# Patient Record
Sex: MALE | Race: WHITE | NOT HISPANIC OR LATINO | Employment: UNEMPLOYED | URBAN - METROPOLITAN AREA
[De-identification: names, ages, dates, MRNs, and addresses within clinical notes are randomized per-mention and may not be internally consistent; named-entity substitution may affect disease eponyms.]

---

## 2017-07-10 ENCOUNTER — OFFICE VISIT (OUTPATIENT)
Dept: CARDIOLOGY | Facility: CLINIC | Age: 52
End: 2017-07-10

## 2017-07-10 VITALS
DIASTOLIC BLOOD PRESSURE: 74 MMHG | WEIGHT: 239 LBS | BODY MASS INDEX: 46.92 KG/M2 | HEIGHT: 60 IN | SYSTOLIC BLOOD PRESSURE: 134 MMHG | HEART RATE: 64 BPM

## 2017-07-10 DIAGNOSIS — R94.31 ABNORMAL ECG: ICD-10-CM

## 2017-07-10 DIAGNOSIS — I10 ESSENTIAL HYPERTENSION: ICD-10-CM

## 2017-07-10 DIAGNOSIS — I48.0 PAROXYSMAL ATRIAL FIBRILLATION (HCC): Primary | ICD-10-CM

## 2017-07-10 DIAGNOSIS — I25.9 MYOCARDIAL ISCHEMIA: ICD-10-CM

## 2017-07-10 PROCEDURE — 93000 ELECTROCARDIOGRAM COMPLETE: CPT | Performed by: INTERNAL MEDICINE

## 2017-07-10 PROCEDURE — 99204 OFFICE O/P NEW MOD 45 MIN: CPT | Performed by: INTERNAL MEDICINE

## 2017-07-10 NOTE — PROGRESS NOTES
Date of Office Visit: 07/10/17  Encounter Provider: Baldomero Velasquez MD  Place of Service: [facility]  Patient Name: Cesar Vann  :1965      Chief Complaint   Patient presents with   • Atrial Fibrillation     History of Present Illness  HPI Comments: Mr Vann is a pleasant 52 yo gentleman with a history of apparently rheumatic fever, and atrial fibrillation, hypertension, hyperlipidemia.  He says that when he was a child he was diagnosed with rheumatic fever and had atrial fibrillation is been on beta blockers since then.  Roughly  or  he had atrial flutter ablation at [facility].  He had been following up with cardiology but is probably not been seen now on 6 or 7 years.  He denies any history of an abnormal EKG.  He denies any chest pain or pressure.  Denies any shortness of breath orthopnea or PND.  He states he still gets palpitations albeit couple times a day usually lasting just a few seconds to minutes without any associated symptoms.  There are days he says he has atrial fibrillation nose episodes can last up all day long.  He does get his INR is routinely checked and is been on warfarin.  He denies any stroke type symptoms such as paralysis, paresthesias, dysarthria, or loss of vision.  Denies any blood in his stool or black tarry stools and no other bleeding.  Overall he feels like he's doing okay he just doesn't handle mental stress as well.    Atrial Fibrillation   Symptoms are negative for dizziness and weakness. Past medical history includes atrial fibrillation.         Past Medical History:   Diagnosis Date   • Acute stress disorder    • Anxiety    • Asthma    • Atrial fibrillation    • Chronic back pain    • Chronic knee pain    • Depression    • Dyslipidemia    • Hypercholesteremia    • Hypertension    • Noncompliance with treatment    • Obesity    • Prediabetes    • Sleep apnea          Past Surgical History:   Procedure  Laterality Date   • ABLATION OF DYSRHYTHMIC FOCUS     • CHOLECYSTECTOMY           Current Outpatient Prescriptions on File Prior to Visit   Medication Sig Dispense Refill   • ARIPiprazole (ABILIFY) 20 MG tablet Take 20 mg by mouth Daily.     • atorvastatin (LIPITOR) 40 MG tablet Take 40 mg by mouth Daily.     • cyclobenzaprine (FLEXERIL) 10 MG tablet Take 10 mg by mouth 3 (Three) Times a Day As Needed for Muscle Spasms.     • metoprolol succinate XL (TOPROL-XL) 100 MG 24 hr tablet Take 100 mg by mouth Daily.     • mirtazapine (REMERON SOL-TAB) 45 MG disintegrating tablet Take 45 mg by mouth Every Night.     • sertraline (ZOLOFT) 100 MG tablet Take 100 mg by mouth Daily.     • warfarin (COUMADIN) 1 MG tablet Take 1 mg by mouth Daily.     • warfarin (COUMADIN) 3 MG tablet Take 3 mg by mouth Daily.       No current facility-administered medications on file prior to visit.          Social History     Social History   • Marital status: Single     Spouse name: N/A   • Number of children: N/A   • Years of education: N/A     Occupational History   • Not on file.     Social History Main Topics   • Smoking status: Never Smoker   • Smokeless tobacco: Not on file   • Alcohol use No   • Drug use: Not on file   • Sexual activity: Not on file     Other Topics Concern   • Not on file     Social History Narrative       Family History   Problem Relation Age of Onset   • Heart disease Father    • Heart disease Paternal Grandfather    • Stroke Other    • Cancer Other    • Stroke Mother          Review of Systems   Constitution: Negative for decreased appetite, diaphoresis, fever, weakness, malaise/fatigue, weight gain and weight loss.   HENT: Negative for congestion, headaches, hearing loss, nosebleeds and tinnitus.    Eyes: Negative for blurred vision, double vision, vision loss in left eye, vision loss in right eye and visual disturbance.   Cardiovascular:        As noted in HPI   Respiratory:        As noted HPI   Endocrine:  "Negative for cold intolerance and heat intolerance.   Hematologic/Lymphatic: Negative for bleeding problem. Does not bruise/bleed easily.   Skin: Negative for color change, flushing, itching and rash.   Musculoskeletal: Negative for arthritis, back pain, joint pain, joint swelling, muscle weakness and myalgias.   Gastrointestinal: Negative for bloating, abdominal pain, constipation, diarrhea, dysphagia, heartburn, hematemesis, hematochezia, melena, nausea and vomiting.   Genitourinary: Negative for bladder incontinence, dysuria, frequency, nocturia and urgency.   Neurological: Negative for dizziness, focal weakness, light-headedness, loss of balance, numbness, paresthesias and vertigo.   Psychiatric/Behavioral: Positive for depression. Negative for memory loss and substance abuse. The patient is nervous/anxious.        Procedures      ECG 12 Lead  Date/Time: 7/10/2017 12:31 PM  Performed by: LATISHA REYES  Authorized by: LATISHA REYES   Comparison: compared with previous ECG   Similar to previous ECG  Rhythm: sinus rhythm  Conduction comments: Short SC   ST Depression: II, III, aVF, V3, V4, V5 and V6  T depression: II, III, aVF, V3, V4, V5 and V6  Other findings: LAE and YELENA               Objective:    /74 (BP Location: Left arm)  Pulse 64  Ht (!) 6\" (15.2 cm)  Wt 239 lb (108 kg)  BMI 4,667.65 kg/m2       Physical Exam  Physical Exam   Constitutional: He is oriented to person, place, and time. He appears well-developed and well-nourished. No distress.   HENT:   Head: Normocephalic.   Eyes: Conjunctivae are normal. Pupils are equal, round, and reactive to light. No scleral icterus.   Neck: Normal carotid pulses, no hepatojugular reflux and no JVD present. Carotid bruit is not present. No tracheal deviation, no edema and no erythema present. No thyromegaly present.   Cardiovascular: Normal rate, regular rhythm, S1 normal, S2 normal, normal heart sounds and intact distal pulses.   No extrasystoles are " present. PMI is not displaced.  Exam reveals no gallop, no distant heart sounds and no friction rub.    No murmur heard.  Pulses:       Carotid pulses are 2+ on the right side, and 2+ on the left side.       Radial pulses are 2+ on the right side, and 2+ on the left side.        Femoral pulses are 2+ on the right side, and 2+ on the left side.       Dorsalis pedis pulses are 2+ on the right side, and 2+ on the left side.        Posterior tibial pulses are 2+ on the right side, and 2+ on the left side.   Pulmonary/Chest: Effort normal and breath sounds normal. No respiratory distress. He has no decreased breath sounds. He has no wheezes. He has no rhonchi. He has no rales. He exhibits no tenderness.   Abdominal: Soft. Bowel sounds are normal. He exhibits no distension and no mass. There is no hepatosplenomegaly. There is no tenderness. There is no rebound and no guarding.   Musculoskeletal: He exhibits no edema, tenderness or deformity.   Neurological: He is alert and oriented to person, place, and time.   Skin: Skin is warm and dry. No rash noted. He is not diaphoretic. No cyanosis or erythema. No pallor. Nails show no clubbing.   Psychiatric: He has a normal mood and affect. His speech is normal and behavior is normal. Judgment and thought content normal.           Assessment:   1.  51-year-old gentleman with reported rheumatic fever not sure that he has rheumatic heart disease a do not hear a murmur today he says in the past she's had a click he's he'll return for an echocardiogram to rule out valvular disease will follow him clinically if unremarkable.  2.  Paroxysmal atrial fibrillation/flutter.  Previous flutter ablation.  Atrial Fibrillation and Atrial Flutter  Assessment  • The patient has atrial fibrillation-initial episode  • This is non-valvular in etiology  • The patient's CHADS2-VASc score is 1  • A [id number]-NLAe score of 1 is considered an intermediate risk for a thromboembolic event  • Warfarin  prescribed    Plan  • Attempt to maintain sinus rhythm  • Continue warfarin for antithrombotic therapy, bleeding issues discussed  • Continue beta blocker for rhythm control  He still has episodes of palpitations but on therapeutic warfarin.  We told him today when he has prolonged episodes he could try taking an extra half of the Toprol to see if that helps.    3.  Markedly abnormal EKG compatible with myocardial ischemia he's to return for perfusion stress this as a one-day non-walking protocol.  Non-walking due to his atrial fibrillation really can't be off beta blocker.  4.  Obstructive sleep apnea on CPAP.  5.  Hypertension blood pressure adequately controlled continue the same  6.  Hyperlipidemia on target dose atorvastatin.          Plan:

## 2017-08-01 ENCOUNTER — TELEPHONE (OUTPATIENT)
Dept: CARDIOLOGY | Facility: CLINIC | Age: 52
End: 2017-08-01

## 2017-08-01 ENCOUNTER — HOSPITAL ENCOUNTER (OUTPATIENT)
Dept: CARDIOLOGY | Facility: HOSPITAL | Age: 52
Discharge: HOME | End: 2017-08-01
Attending: INTERNAL MEDICINE

## 2017-08-01 ENCOUNTER — HOSPITAL ENCOUNTER (OUTPATIENT)
Dept: CARDIOLOGY | Facility: HOSPITAL | Age: 52
Discharge: HOME | End: 2017-08-01
Attending: INTERNAL MEDICINE | Admitting: INTERNAL MEDICINE

## 2017-08-01 VITALS
HEART RATE: 108 BPM | HEIGHT: 72 IN | BODY MASS INDEX: 32.37 KG/M2 | WEIGHT: 239 LBS | OXYGEN SATURATION: 96 % | DIASTOLIC BLOOD PRESSURE: 78 MMHG | SYSTOLIC BLOOD PRESSURE: 110 MMHG

## 2017-08-01 LAB
ASCENDING AORTA: 2.4 CM
BH CV ECHO MEAS - ACS: 2 CM
BH CV ECHO MEAS - AO MAX PG (FULL): 2.5 MMHG
BH CV ECHO MEAS - AO MAX PG: 6.9 MMHG
BH CV ECHO MEAS - AO MEAN PG (FULL): 0.76 MMHG
BH CV ECHO MEAS - AO MEAN PG: 3.2 MMHG
BH CV ECHO MEAS - AO ROOT AREA (BSA CORRECTED): 1.3
BH CV ECHO MEAS - AO ROOT AREA: 6.7 CM^2
BH CV ECHO MEAS - AO ROOT DIAM: 2.9 CM
BH CV ECHO MEAS - AO V2 MAX: 131.2 CM/SEC
BH CV ECHO MEAS - AO V2 MEAN: 81.7 CM/SEC
BH CV ECHO MEAS - AO V2 VTI: 17.6 CM
BH CV ECHO MEAS - AVA(I,A): 2.1 CM^2
BH CV ECHO MEAS - AVA(I,D): 2.1 CM^2
BH CV ECHO MEAS - AVA(V,A): 2.1 CM^2
BH CV ECHO MEAS - AVA(V,D): 2.1 CM^2
BH CV ECHO MEAS - BSA(HAYCOCK): 2.4 M^2
BH CV ECHO MEAS - BSA: 2.3 M^2
BH CV ECHO MEAS - BZI_BMI: 32.4 KILOGRAMS/M^2
BH CV ECHO MEAS - BZI_METRIC_HEIGHT: 182.9 CM
BH CV ECHO MEAS - BZI_METRIC_WEIGHT: 108.4 KG
BH CV ECHO MEAS - CONTRAST EF (2CH): 60.3 ML/M^2
BH CV ECHO MEAS - CONTRAST EF 4CH: 58.8 ML/M^2
BH CV ECHO MEAS - EDV(MOD-SP2): 58 ML
BH CV ECHO MEAS - EDV(MOD-SP4): 68 ML
BH CV ECHO MEAS - EDV(TEICH): 117 ML
BH CV ECHO MEAS - EF(CUBED): 73.9 %
BH CV ECHO MEAS - EF(MOD-SP2): 60.3 %
BH CV ECHO MEAS - EF(MOD-SP4): 58.8 %
BH CV ECHO MEAS - EF(TEICH): 65.5 %
BH CV ECHO MEAS - ESV(MOD-SP2): 23 ML
BH CV ECHO MEAS - ESV(MOD-SP4): 28 ML
BH CV ECHO MEAS - ESV(TEICH): 40.4 ML
BH CV ECHO MEAS - FS: 36.1 %
BH CV ECHO MEAS - IVS/LVPW: 1
BH CV ECHO MEAS - IVSD: 1.2 CM
BH CV ECHO MEAS - LAT PEAK E' VEL: 5 CM/SEC
BH CV ECHO MEAS - LV DIASTOLIC VOL/BSA (35-75): 29.6 ML/M^2
BH CV ECHO MEAS - LV MASS(C)D: 238 GRAMS
BH CV ECHO MEAS - LV MASS(C)DI: 103.6 GRAMS/M^2
BH CV ECHO MEAS - LV MAX PG: 4.4 MMHG
BH CV ECHO MEAS - LV MEAN PG: 2.4 MMHG
BH CV ECHO MEAS - LV SYSTOLIC VOL/BSA (12-30): 12.2 ML/M^2
BH CV ECHO MEAS - LV V1 MAX: 104.3 CM/SEC
BH CV ECHO MEAS - LV V1 MEAN: 72.8 CM/SEC
BH CV ECHO MEAS - LV V1 VTI: 13.8 CM
BH CV ECHO MEAS - LVIDD: 5 CM
BH CV ECHO MEAS - LVIDS: 3.2 CM
BH CV ECHO MEAS - LVLD AP2: 6.7 CM
BH CV ECHO MEAS - LVLD AP4: 7.3 CM
BH CV ECHO MEAS - LVLS AP2: 6.3 CM
BH CV ECHO MEAS - LVLS AP4: 6 CM
BH CV ECHO MEAS - LVOT AREA (M): 2.5 CM^2
BH CV ECHO MEAS - LVOT AREA: 2.7 CM^2
BH CV ECHO MEAS - LVOT DIAM: 1.8 CM
BH CV ECHO MEAS - LVPWD: 1.2 CM
BH CV ECHO MEAS - MED PEAK E' VEL: 6 CM/SEC
BH CV ECHO MEAS - MV DEC SLOPE: 284.4 CM/SEC^2
BH CV ECHO MEAS - MV DEC TIME: 0.2 SEC
BH CV ECHO MEAS - MV E MAX VEL: 60.6 CM/SEC
BH CV ECHO MEAS - MV MAX PG: 2.8 MMHG
BH CV ECHO MEAS - MV MEAN PG: 1.4 MMHG
BH CV ECHO MEAS - MV P1/2T MAX VEL: 62.1 CM/SEC
BH CV ECHO MEAS - MV P1/2T: 63.9 MSEC
BH CV ECHO MEAS - MV V2 MAX: 83.4 CM/SEC
BH CV ECHO MEAS - MV V2 MEAN: 54.9 CM/SEC
BH CV ECHO MEAS - MV V2 VTI: 13.5 CM
BH CV ECHO MEAS - MVA P1/2T LCG: 3.5 CM^2
BH CV ECHO MEAS - MVA(P1/2T): 3.4 CM^2
BH CV ECHO MEAS - MVA(VTI): 2.7 CM^2
BH CV ECHO MEAS - PA ACC TIME: 0.13 SEC
BH CV ECHO MEAS - PA MAX PG (FULL): 2.4 MMHG
BH CV ECHO MEAS - PA MAX PG: 4.6 MMHG
BH CV ECHO MEAS - PA PR(ACCEL): 20.4 MMHG
BH CV ECHO MEAS - PA V2 MAX: 107.2 CM/SEC
BH CV ECHO MEAS - PULM DIAS VEL: 75.3 CM/SEC
BH CV ECHO MEAS - PULM S/D: 0.49
BH CV ECHO MEAS - PULM SYS VEL: 36.9 CM/SEC
BH CV ECHO MEAS - PVA(V,A): 2.2 CM^2
BH CV ECHO MEAS - PVA(V,D): 2.2 CM^2
BH CV ECHO MEAS - QP/QS: 1
BH CV ECHO MEAS - RAP SYSTOLE: 3 MMHG
BH CV ECHO MEAS - RV MAX PG: 2.2 MMHG
BH CV ECHO MEAS - RV MEAN PG: 1.2 MMHG
BH CV ECHO MEAS - RV V1 MAX: 73.7 CM/SEC
BH CV ECHO MEAS - RV V1 MEAN: 51.5 CM/SEC
BH CV ECHO MEAS - RV V1 VTI: 11.6 CM
BH CV ECHO MEAS - RVOT AREA: 3.2 CM^2
BH CV ECHO MEAS - RVOT DIAM: 2 CM
BH CV ECHO MEAS - SI(AO): 51 ML/M^2
BH CV ECHO MEAS - SI(CUBED): 39.7 ML/M^2
BH CV ECHO MEAS - SI(LVOT): 16 ML/M^2
BH CV ECHO MEAS - SI(MOD-SP2): 15.2 ML/M^2
BH CV ECHO MEAS - SI(MOD-SP4): 17.4 ML/M^2
BH CV ECHO MEAS - SI(TEICH): 33.4 ML/M^2
BH CV ECHO MEAS - SUP REN AO DIAM: 1.9 CM
BH CV ECHO MEAS - SV(AO): 117.2 ML
BH CV ECHO MEAS - SV(CUBED): 91.2 ML
BH CV ECHO MEAS - SV(LVOT): 36.7 ML
BH CV ECHO MEAS - SV(MOD-SP2): 35 ML
BH CV ECHO MEAS - SV(MOD-SP4): 40 ML
BH CV ECHO MEAS - SV(RVOT): 36.6 ML
BH CV ECHO MEAS - SV(TEICH): 76.7 ML
BH CV ECHO MEAS - TAPSE (>1.6): 1.9 CM2
BH CV STRESS BP STAGE 1: NORMAL
BH CV STRESS COMMENTS STAGE 1: NORMAL
BH CV STRESS DOSE REGADENOSON STAGE 1: 0.4
BH CV STRESS DURATION MIN STAGE 1: 0
BH CV STRESS DURATION SEC STAGE 1: 15
BH CV STRESS HR STAGE 1: 148
BH CV STRESS PROTOCOL 1: NORMAL
BH CV STRESS RECOVERY BP: NORMAL MMHG
BH CV STRESS RECOVERY HR: 148 BPM
BH CV STRESS STAGE 1: 1
BH CV XLRA - RV BASE: 2.6 CM
BH CV XLRA - TDI S': 14 CM/SEC
E/E' RATIO: 5.5
LEFT ATRIUM VOLUME INDEX: 15 ML/M2
LV EF 2D ECHO EST: 59 %
LV EF NUC BP: 53 %
MAXIMAL PREDICTED HEART RATE: 169 BPM
PERCENT MAX PREDICTED HR: 87.57 %
SINUS: 2.4 CM
STJ: 2.3 CM
STRESS BASELINE BP: NORMAL MMHG
STRESS BASELINE HR: 118 BPM
STRESS PERCENT HR: 103 %
STRESS POST EXERCISE DUR SEC: 15 SEC
STRESS POST PEAK BP: NORMAL MMHG
STRESS POST PEAK HR: 148 BPM
STRESS TARGET HR: 144 BPM

## 2017-08-01 PROCEDURE — 93017 CV STRESS TEST TRACING ONLY: CPT

## 2017-08-01 PROCEDURE — 78452 HT MUSCLE IMAGE SPECT MULT: CPT | Performed by: INTERNAL MEDICINE

## 2017-08-01 PROCEDURE — 25010000002 PERFLUTREN (DEFINITY) 8.476 MG IN SODIUM CHLORIDE 10 ML INJECTION: Performed by: INTERNAL MEDICINE

## 2017-08-01 PROCEDURE — 93016 CV STRESS TEST SUPVJ ONLY: CPT | Performed by: INTERNAL MEDICINE

## 2017-08-01 PROCEDURE — C8929 TTE W OR WO FOL WCON,DOPPLER: HCPCS

## 2017-08-01 PROCEDURE — 25010000002 AMINOPHYLLINE PER 250 MG: Performed by: INTERNAL MEDICINE

## 2017-08-01 PROCEDURE — 93306 TTE W/DOPPLER COMPLETE: CPT | Performed by: INTERNAL MEDICINE

## 2017-08-01 PROCEDURE — 78452 HT MUSCLE IMAGE SPECT MULT: CPT

## 2017-08-01 PROCEDURE — 0 TECHNETIUM TETROFOSMIN KIT: Performed by: INTERNAL MEDICINE

## 2017-08-01 PROCEDURE — 25010000002 REGADENOSON 0.4 MG/5ML SOLUTION: Performed by: INTERNAL MEDICINE

## 2017-08-01 PROCEDURE — 93018 CV STRESS TEST I&R ONLY: CPT | Performed by: INTERNAL MEDICINE

## 2017-08-01 PROCEDURE — A9502 TC99M TETROFOSMIN: HCPCS | Performed by: INTERNAL MEDICINE

## 2017-08-01 RX ORDER — AMINOPHYLLINE DIHYDRATE 25 MG/ML
125 INJECTION, SOLUTION INTRAVENOUS ONCE
Status: COMPLETED | OUTPATIENT
Start: 2017-08-01 | End: 2017-08-01

## 2017-08-01 RX ADMIN — REGADENOSON 0.4 MG: 0.08 INJECTION, SOLUTION INTRAVENOUS at 13:10

## 2017-08-01 RX ADMIN — TETROFOSMIN 1 DOSE: 1.38 INJECTION, POWDER, LYOPHILIZED, FOR SOLUTION INTRAVENOUS at 13:10

## 2017-08-01 RX ADMIN — TETROFOSMIN 1 DOSE: 1.38 INJECTION, POWDER, LYOPHILIZED, FOR SOLUTION INTRAVENOUS at 12:01

## 2017-08-01 RX ADMIN — AMINOPHYLLINE 125 MG: 25 INJECTION, SOLUTION INTRAVENOUS at 13:20

## 2017-08-01 RX ADMIN — PERFLUTREN 1.5 ML: 6.52 INJECTION, SUSPENSION INTRAVENOUS at 11:59

## 2017-08-01 NOTE — TELEPHONE ENCOUNTER
Cesar Vann  Male, 51 y.o., 1965  PCP:   JUICE Ch  Language:   English  Need Interp:   No  Last Weight:   239 lb (108 kg)  Phone:   H: [phone]  Allergies  No Known Allergies  Health Maintenance:   Due  FYI:   None  Primary Ins.:   JOSE GUTIERREZ  MRN:   [id number]  MyChart:   Inactive  Pharmacy:   [address] [phone] [facility] [phone] FX [[id number]]  Preferred Lab:   None  Next Appt with Me:   None  Next Appt Date by Dept:   None    PACS Images        Radiology Images         Adult Transthoracic Echo Complete With Contrast   Status:  Final result   Visible to patient:  No (Not Released) Dx:  Paroxysmal atrial fibrillation Order: [id number]         Details        Reading Physician Reading Date Result Priority       Valerie Adams MD 8/1/2017 Routine           Result Text             · Left ventricular systolic function is normal. Calculated EF = 58.8%. Estimated EF was in agreement with the calculated EF. Estimated EF = 59%. Left ventricular wall thickness is consistent with moderate concentric hypertrophy. Left ventricular diastolic was unable to be assessed.  · There is mild thickening of the aortic valve.  · Trace mitral valve regurgitation is present.                    All Measurements          Ref Range & Units 12:00 PM         BSA m^2 2.3P       IVSd cm 1.2P       LVIDd cm 5.0P       LVIDs cm 3.2P       LVPWd cm 1.2P       IVS/LVPW  1.0P       FS % 36.1P       EDV(Teich) ml 117.0P       ESV(Teich) ml 40.4P       EF(Teich) % 65.5P       EF(cubed) % 73.9P       LV mass(C)d grams 238.0P       LV mass(C)dI grams/m^2 103.6P       SV(Teich) ml 76.7P       SI(Teich) ml/m^2 33.4P       SV(cubed) ml 91.2P       SI(cubed) ml/m^2 39.7P       Ao root diam cm 2.9P       Ao root area cm^2 6.7P       ACS cm 2.0P       LVOT diam cm 1.8P       LVOT area cm^2 2.7P       LVOT area(traced) cm^2 2.5P       RVOT diam cm 2.0P       RVOT area cm^2 3.2P        LVLd ap4 cm 7.3P       EDV(MOD-sp4) ml 68.0P       LVLs ap4 cm 6.0P       ESV(MOD-sp4) ml 28.0P       EF(MOD-sp4) % 58.8P       LVLd ap2 cm 6.7P       EDV(MOD-sp2) ml 58.0P       LVLs ap2 cm 6.3P       ESV(MOD-sp2) ml 23.0P       EF(MOD-sp2) % 60.3P       SV(MOD-sp4) ml 40.0P       SI(MOD-sp4) ml/m^2 17.4P       SV(MOD-sp2) ml 35.0P       SI(MOD-sp2) ml/m^2 15.2P       Ao root area (BSA corrected)  1.3P       Ao root area (BSA corrected) ml/m^2 60.3P       CONTRAST EF 4CH ml/m^2 58.8P       LV Diastolic corrected for BSA ml/m^2 29.6P       LV Systolic corrected for BSA ml/m^2 12.2P       MV E max sunday cm/sec 60.6P       MV V2 max cm/sec 83.4P       MV max PG mmHg 2.8P       MV V2 mean cm/sec 54.9P       MV mean PG mmHg 1.4P       MV V2 VTI cm 13.5P       MVA(VTI) cm^2 2.7P       MV P1/2t max sunday cm/sec 62.1P       MV P1/2t msec 63.9P       MVA(P1/2t) cm^2 3.4P       MV dec slope cm/sec^2 284.4P       MV dec time sec 0.2P       Ao pk sunday cm/sec 131.2P       Ao max PG mmHg 6.9P       Ao max PG (full) mmHg 2.5P       Ao V2 mean cm/sec 81.7P       Ao mean PG mmHg 3.2P       Ao mean PG (full) mmHg 0.76P       Ao V2 VTI cm 17.6P       RACHELLE(I,A) cm^2 2.1P       RACHELLE(I,D) cm^2 2.1P       RACHELLE(V,A) cm^2 2.1P       RACHELLE(V,D) cm^2 2.1P       LV V1 max PG mmHg 4.4P       LV V1 mean PG mmHg 2.4P       LV V1 max cm/sec 104.3P       LV V1 mean cm/sec 72.8P       LV V1 VTI cm 13.8P       SV(Ao) ml 117.2P       SI(Ao) ml/m^2 51.0P       SV(LVOT) ml 36.7P       SV(RVOT) ml 36.6P       SI(LVOT) ml/m^2 16.0P       PA V2 max cm/sec 107.2P       PA max PG mmHg 4.6P       PA max PG (full) mmHg 2.4P       BH CV ECHO MIGUELITO - PVA(V,A) cm^2 2.2P       BH CV ECHO MIGUELITO - PVA(V,D) cm^2 2.2P       PA acc time sec 0.13P       RV V1 max PG mmHg 2.2P       RV V1 mean PG mmHg 1.2P       RV V1 max cm/sec 73.7P       RV V1 mean cm/sec 51.5P       RV V1 VTI cm 11.6P       RAP systole mmHg 3.0P       PA pr(Accel) mmHg 20.4P       Pulm Sys Sunday cm/sec 36.9P        Pulm Espinosa Sunday cm/sec 75.3P       Pulm S/D  0.49P       Qp/Qs  1.0P       MVA P1/2T LCG cm^2 3.5P       BH CV ECHO MIGUELITO - BZI_BMI kilograms/m^2 32.4P       BH CV ECHO MIGUELITO - BSA(HAYCOCK) m^2 2.4P       BH CV ECHO MIGUELITO - BZI_METRIC_WEIGHT kg 108.4P        CV ECHO MIGUELITO - BZI_METRIC_HEIGHT cm 182.9P       TDI S' cm/sec 14.00       RV Base cm 2.60       Sinus cm 2.40       STJ cm 2.30       Ascending aorta cm 2.40       E/E' ratio  5.5       LA Volume Index mL/m2 15.0       Lat Peak E' Sunday cm/sec 5.0       Med Peak E' Sunday cm/sec 6.00       Sup Jerry Ao Diam cm 1.90       TAPSE (>1.6) cm2 1.90       Echo EF Estimated % 59                       [facility] OUTPATIENT ECHOCARDIOGRAPHY  [address]  [address]  [facility] [address] [phone]            Study Description        A two-dimensional transthoracic echocardiogram with color flow and Doppler was performed.   The study is technically good for diagnosis.Verbal consent was obtained from the patient to use Definity contrast in order to optimize the study. The use of Definity was indicated as two or more contiguous segments of the left ventricular endocardial border were unable to be adequately visualized by standard imaging methods. 1.3 mL of mechanically activated Definity was mixed with 8.7 mL of normal saline. A total of 1.5 mL of the resulting Definity solution was administered, and the remaining contrast was wasted and discarded.   No adverse reaction to contrast was noted. Lot# [id number]  Exp 8/1/18.   Atrial fibrillation was the predominant rhythm observed during the procedure.         Echocardiogram Findings        Left Ventricle  Left ventricular systolic function is normal. Calculated EF = 58.8%. Estimated EF was in agreement with the calculated EF. Estimated EF = 59%. Left ventricular wall thickness is consistent with moderate concentric hypertrophy. Left ventricular diastolic was unable to be assessed.       Right Ventricle  Normal  right ventricular cavity size, wall thickness, systolic function and septal motion noted.       Left Atrium  Normal left atrial volume noted.       Right Atrium  Normal right atrial size noted. The inferior vena cava is normally sized. Normal IVC inspiratory collapse of greater than 50% noted.       Aortic Valve  The valve was poorly visualized but appears trileaflet. The aortic valve is abnormal in structure. There is mild thickening of the aortic valve. No aortic valve regurgitation is present. No aortic valve stenosis is present.       Mitral Valve  The mitral valve is normal in structure. Trace mitral valve regurgitation is present. No significant mitral valve stenosis is present.       Tricuspid Valve  The tricuspid valve is grossly normal. No tricuspid valve regurgitation is present.       Pulmonic Valve  The pulmonic valve is grossly normal in structure. There is no pulmonic valve regurgitation present.       Greater Vessels  No dilation of the aortic root is present. No dilation of the sinuses of Valsalva is present. The aortic arch was not well visualized. The inferior vena cava is normally sized. Normal IVC inspiratory collapse of greater than 50% noted.       Pericardium  The pericardium is normal. There is no evidence of pericardial effusion.           Wall Scoring        Score Index: 1.000 Percent Normal: 100.0%             The left ventricular wall motion is normal.                         PACS Images        Show images for Adult Transthoracic Echo Complete With Contrast                 Last Resulted: 08/01/17 12:04 PM                 P=Value has a preliminary status                Status of Other Orders        Order    Lab Status Result Date Provider Status       Stress Test With Myocardial Perfusion One Day In process 8/1/2017 Open       ECG 12 Lead Final result 7/10/2017 Open       SCANNED EKG Final result 7/10/2017 Open              Canceled By On Released       Adult Transthoracic Echo Complete  Nazia Edmonds RDCS 8/1/2017 12:00 PM 7/10/2017 12:25 PM       Reason: Per Protocol                  Routing History        Priority Sent On From To Message Type        8/1/2017 12:14 PM MD Baldomero Padilla MD Results

## 2017-08-01 NOTE — TELEPHONE ENCOUNTER
Cesar Vann  Male, 51 y.o., 1965  PCP:   JUICE Ch  Language:   English  Need Interp:   No  Last Weight:   239 lb (108 kg)  Phone:   H: [phone]  Allergies  No Known Allergies  Health Maintenance:   Due  FYI:   None  Primary Ins.:   JOSE GUTIERREZ  MRN:   [id number]  MyChart:   Inactive  Pharmacy:   [address] [phone] [facility] [phone] FX [[id number]]  Preferred Lab:   None  Next Appt with Me:   None  Next Appt Date by Dept:   None    PACS Images        Radiology Images         Stress Test With Myocardial Perfusion One Day   Status:  Final result   Visible to patient:  No (Not Released) Dx:  Myocardial ischemia Order: [id number]         Details        Reading Physician Reading Date Result Priority       MD Mele Sweeney MD Louis G Meriwether, MD 8/1/2017 8/1/2017 8/1/2017 Routine           Result Text             · Myocardial perfusion imaging indicates a normal myocardial perfusion study with no evidence of ischemia.  · Left ventricular ejection fraction is normal (Calculated EF = 53%).  · Impressions are consistent with a low risk study.                    All Measurements          Ref Range & Units 2:15 PM         BH CV STRESS PROTOCOL 1  Pharmacologic       Stage 1  1       HR Stage 1  148       BP Stage 1  150/92       Duration Min Stage 1  0       Duration Sec Stage 1  15       Stress Dose Regadenoson Stage 1  0.4       Stress Comments Stage 1  15 sec bolus injection       Baseline HR bpm 118       Baseline BP mmHg 145/99       Peak HR bpm 148       Percent Max Pred HR % 87.57       Percent Target HR % 103       Peak BP mmHg 150/92       Recovery HR bpm 148       Recovery BP mmHg 152/88       Target HR (85%) bpm 144       Max. Pred. HR (100%) bpm 169       Exercise duration (sec) sec 15       Nuc Stress EF % 53                       [facility] NUC CARD  [address] [employer]  [facility]  [address]  [facility] [address]  [phone]            Stress Data        Stage HR (bpm) BP (mmHg) Minutes Seconds Dose (mg) Comments       1        148        150/92        0        15        0.4        15 sec bolus injection                  Stress Measurements        Baseline Vitals   Baseline  bpm        Baseline /99 mmHg         Peak Stress Vitals   Peak  bpm        Peak /92 mmHg         Recovery Vitals   Recovery  bpm        Recovery /88 mmHg         Exercise Data   Target HR (85%) 144 bpm        Max. Pred. HR (100%) 169 bpm        Percent Max Pred HR 87.57 %        Exercise duration (sec) 15 sec                 Stress Procedure        Rest ECG  Baseline ECG rhythm of atrial fibrillation noted. Non-specific ST-T wave changes noted.   QRS complex = 80 ms. There was no ST segment deviation noted.       Stress ECG  Stress ECG rhythm of atrial fibrillation noted. Non-specific ST-T wave changes noted.   There was no ST segment deviation noted during stress.   There were no arrhythmias during stress.   There were no arrhythmias during recovery.   There were no significant arrhythmias noted during the test.   ECG was interpretable.   Equivocal stress ECG interpretation.       Stress Description  A pharmacological stress test was performed using regadenoson without low-level exercise.   The patient reached the end of the protocol.   The patient reported dyspnea during the stress test. Symptoms were not clinically significant.       Stress Findings  No ECG evidence of myocardial ischemia.Indeterminate clinical evidence of myocardial ischemia. Findings consistent with an equivocal ECG stress test.           Nuclear Perfusion Findings        Study Impression  Myocardial perfusion imaging indicates a normal myocardial perfusion study with no evidence of ischemia. Impressions are consistent with a low risk study. There is no prior study available for comparison.       Rest Perfusion  Defect 1  No rest myocardial perfusion defect noted.       Stress Perfusion Defect 1  No stress myocardial perfusion defect noted.       Ventricle Size / Description  Left ventricular ejection fraction is normal (Calculated EF = 53%). Normal LV cavity size. Normal LV wall motion noted. Normal RV cavity size.           PACS Images        Show images for Stress Test With Myocardial Perfusion One Day                 Last Resulted: 08/01/17  3:58 PM                            Status of Other Orders        Order    Lab Status Result Date Provider Status       ECG 12 Lead Final result 7/10/2017 Open       SCANNED EKG Final result 7/10/2017 Open       Adult Transthoracic Echo Complete With Contrast Final result 8/1/2017 Reviewed 8/1/2017  2:21 PM              Canceled By On Released       Adult Transthoracic Echo Complete Nazia Edmonds RDCS 8/1/2017 12:00 PM 7/10/2017 12:25 PM       Reason: Per Protocol                  Routing History        Priority Sent On From To Message Type        8/1/2017  4:00 PM MD Baldomero Sweeney MD Results        8/1/2017 12:14 PM MD Baldomero Padilla MD Results

## 2017-08-14 NOTE — TELEPHONE ENCOUNTER
Called as below. Needs a BP cuff and check it at home and call back with those. Will go back up to normal toprol dose.STANFORDI

## 2018-01-22 RX ORDER — METOPROLOL SUCCINATE 100 MG/1
100 TABLET, EXTENDED RELEASE ORAL DAILY
Qty: 90 TABLET | Refills: 2 | Status: SHIPPED | OUTPATIENT
Start: 2018-01-22 | End: 2018-04-02 | Stop reason: SDUPTHER

## 2018-04-02 RX ORDER — METOPROLOL SUCCINATE 100 MG/1
100 TABLET, EXTENDED RELEASE ORAL DAILY
Qty: 90 TABLET | Refills: 1 | Status: SHIPPED | OUTPATIENT
Start: 2018-04-02 | End: 2018-11-07 | Stop reason: SDUPTHER

## 2018-09-05 RX ORDER — WARFARIN SODIUM 3 MG/1
TABLET ORAL
Qty: 30 TABLET | Refills: 0 | OUTPATIENT
Start: 2018-09-05

## 2018-11-12 RX ORDER — METOPROLOL SUCCINATE 100 MG/1
100 TABLET, EXTENDED RELEASE ORAL DAILY
Qty: 90 TABLET | Refills: 0 | Status: SHIPPED | OUTPATIENT
Start: 2018-11-12 | End: 2019-04-13 | Stop reason: SDUPTHER

## 2019-04-15 RX ORDER — METOPROLOL SUCCINATE 100 MG/1
100 TABLET, EXTENDED RELEASE ORAL DAILY
Qty: 90 TABLET | Refills: 0 | Status: SHIPPED | OUTPATIENT
Start: 2019-04-15 | End: 2019-09-17 | Stop reason: SDUPTHER

## 2019-09-17 RX ORDER — METOPROLOL SUCCINATE 100 MG/1
100 TABLET, EXTENDED RELEASE ORAL DAILY
Qty: 90 TABLET | Refills: 0 | Status: SHIPPED | OUTPATIENT
Start: 2019-09-17 | End: 2019-12-17 | Stop reason: SDUPTHER

## 2019-12-18 RX ORDER — METOPROLOL SUCCINATE 100 MG/1
100 TABLET, EXTENDED RELEASE ORAL DAILY
Qty: 30 TABLET | Refills: 0 | Status: SHIPPED | OUTPATIENT
Start: 2019-12-18 | End: 2020-02-19 | Stop reason: SDUPTHER

## 2020-03-07 ENCOUNTER — HOSPITAL ENCOUNTER (INPATIENT)
Facility: HOSPITAL | Age: 55
LOS: 12 days | Discharge: HOME | End: 2020-03-19
Attending: EMERGENCY MEDICINE | Admitting: INTERNAL MEDICINE

## 2020-03-07 ENCOUNTER — APPOINTMENT (OUTPATIENT)
Dept: CT IMAGING | Facility: HOSPITAL | Age: 55
End: 2020-03-07

## 2020-03-07 DIAGNOSIS — K85.90 ACUTE PANCREATITIS, UNSPECIFIED COMPLICATION STATUS, UNSPECIFIED PANCREATITIS TYPE: Primary | ICD-10-CM

## 2020-03-07 DIAGNOSIS — E11.9 TYPE 2 DIABETES MELLITUS WITHOUT COMPLICATION, WITHOUT LONG-TERM CURRENT USE OF INSULIN (HCC): ICD-10-CM

## 2020-03-07 DIAGNOSIS — R73.9 HYPERGLYCEMIA: ICD-10-CM

## 2020-03-07 DIAGNOSIS — I10 ESSENTIAL HYPERTENSION: ICD-10-CM

## 2020-03-07 PROBLEM — E78.00 HYPERCHOLESTEREMIA: Status: ACTIVE | Noted: 2020-03-07

## 2020-03-07 PROBLEM — J45.909 ASTHMA: Status: ACTIVE | Noted: 2020-03-07

## 2020-03-07 PROBLEM — G47.30 SLEEP APNEA: Status: ACTIVE | Noted: 2020-03-07

## 2020-03-07 LAB
ALBUMIN SERPL-MCNC: 4.6 G/DL (ref 3.5–5.2)
ALBUMIN/GLOB SERPL: 1.9 G/DL
ALP SERPL-CCNC: 62 U/L (ref 39–117)
ALT SERPL W P-5'-P-CCNC: 74 U/L (ref 1–41)
ANION GAP SERPL CALCULATED.3IONS-SCNC: 12.3 MMOL/L (ref 5–15)
ANION GAP SERPL CALCULATED.3IONS-SCNC: 15.8 MMOL/L (ref 5–15)
AST SERPL-CCNC: 58 U/L (ref 1–40)
BASOPHILS # BLD AUTO: 0.02 10*3/MM3 (ref 0–0.2)
BASOPHILS NFR BLD AUTO: 0.1 % (ref 0–1.5)
BILIRUB SERPL-MCNC: 0.6 MG/DL (ref 0.2–1.2)
BILIRUB UR QL STRIP: NEGATIVE
BUN BLD-MCNC: 18 MG/DL (ref 6–20)
BUN BLD-MCNC: 18 MG/DL (ref 6–20)
BUN/CREAT SERPL: 17 (ref 7–25)
BUN/CREAT SERPL: 17.3 (ref 7–25)
CALCIUM SPEC-SCNC: 8.5 MG/DL (ref 8.6–10.5)
CALCIUM SPEC-SCNC: 9.3 MG/DL (ref 8.6–10.5)
CHLORIDE SERPL-SCNC: 103 MMOL/L (ref 98–107)
CHLORIDE SERPL-SCNC: 105 MMOL/L (ref 98–107)
CLARITY UR: CLEAR
CO2 SERPL-SCNC: 21.2 MMOL/L (ref 22–29)
CO2 SERPL-SCNC: 23.7 MMOL/L (ref 22–29)
COLOR UR: YELLOW
CREAT BLD-MCNC: 1.04 MG/DL (ref 0.76–1.27)
CREAT BLD-MCNC: 1.06 MG/DL (ref 0.76–1.27)
DEPRECATED RDW RBC AUTO: 42.7 FL (ref 37–54)
DEPRECATED RDW RBC AUTO: 47.1 FL (ref 37–54)
EOSINOPHIL # BLD AUTO: 0 10*3/MM3 (ref 0–0.4)
EOSINOPHIL NFR BLD AUTO: 0 % (ref 0.3–6.2)
ERYTHROCYTE [DISTWIDTH] IN BLOOD BY AUTOMATED COUNT: 13.1 % (ref 12.3–15.4)
ERYTHROCYTE [DISTWIDTH] IN BLOOD BY AUTOMATED COUNT: 13.8 % (ref 12.3–15.4)
GFR SERPL CREATININE-BSD FRML MDRD: 73 ML/MIN/1.73
GFR SERPL CREATININE-BSD FRML MDRD: 74 ML/MIN/1.73
GLOBULIN UR ELPH-MCNC: 2.4 GM/DL
GLUCOSE BLD-MCNC: 263 MG/DL (ref 65–99)
GLUCOSE BLD-MCNC: 346 MG/DL (ref 65–99)
GLUCOSE BLDC GLUCOMTR-MCNC: 193 MG/DL (ref 70–130)
GLUCOSE BLDC GLUCOMTR-MCNC: 198 MG/DL (ref 70–130)
GLUCOSE BLDC GLUCOMTR-MCNC: 217 MG/DL (ref 70–130)
GLUCOSE UR STRIP-MCNC: ABNORMAL MG/DL
HBA1C MFR BLD: 8.8 % (ref 4.8–5.6)
HCT VFR BLD AUTO: 44.3 % (ref 37.5–51)
HCT VFR BLD AUTO: 47.1 % (ref 37.5–51)
HGB BLD-MCNC: 15.3 G/DL (ref 13–17.7)
HGB BLD-MCNC: 16.5 G/DL (ref 13–17.7)
HGB UR QL STRIP.AUTO: NEGATIVE
HOLD SPECIMEN: NORMAL
HOLD SPECIMEN: NORMAL
IMM GRANULOCYTES # BLD AUTO: 0.06 10*3/MM3 (ref 0–0.05)
IMM GRANULOCYTES NFR BLD AUTO: 0.4 % (ref 0–0.5)
INR PPP: 1.08 (ref 0.9–1.1)
KETONES UR QL STRIP: ABNORMAL
LEUKOCYTE ESTERASE UR QL STRIP.AUTO: NEGATIVE
LIPASE SERPL-CCNC: >3000 U/L (ref 13–60)
LYMPHOCYTES # BLD AUTO: 1.15 10*3/MM3 (ref 0.7–3.1)
LYMPHOCYTES NFR BLD AUTO: 7.6 % (ref 19.6–45.3)
MCH RBC QN AUTO: 31.1 PG (ref 26.6–33)
MCH RBC QN AUTO: 32.5 PG (ref 26.6–33)
MCHC RBC AUTO-ENTMCNC: 34.5 G/DL (ref 31.5–35.7)
MCHC RBC AUTO-ENTMCNC: 35 G/DL (ref 31.5–35.7)
MCV RBC AUTO: 90 FL (ref 79–97)
MCV RBC AUTO: 92.9 FL (ref 79–97)
MONOCYTES # BLD AUTO: 0.5 10*3/MM3 (ref 0.1–0.9)
MONOCYTES NFR BLD AUTO: 3.3 % (ref 5–12)
NEUTROPHILS # BLD AUTO: 13.36 10*3/MM3 (ref 1.7–7)
NEUTROPHILS NFR BLD AUTO: 88.6 % (ref 42.7–76)
NITRITE UR QL STRIP: NEGATIVE
NRBC BLD AUTO-RTO: 0 /100 WBC (ref 0–0.2)
PH UR STRIP.AUTO: <=5 [PH] (ref 5–8)
PLATELET # BLD AUTO: 163 10*3/MM3 (ref 140–450)
PLATELET # BLD AUTO: 171 10*3/MM3 (ref 140–450)
PMV BLD AUTO: 9.4 FL (ref 6–12)
PMV BLD AUTO: 9.6 FL (ref 6–12)
POTASSIUM BLD-SCNC: 4.7 MMOL/L (ref 3.5–5.2)
POTASSIUM BLD-SCNC: 4.7 MMOL/L (ref 3.5–5.2)
PROT SERPL-MCNC: 7 G/DL (ref 6–8.5)
PROT UR QL STRIP: NEGATIVE
PROTHROMBIN TIME: 13.7 SECONDS (ref 11.7–14.2)
RBC # BLD AUTO: 4.92 10*6/MM3 (ref 4.14–5.8)
RBC # BLD AUTO: 5.07 10*6/MM3 (ref 4.14–5.8)
SODIUM BLD-SCNC: 140 MMOL/L (ref 136–145)
SODIUM BLD-SCNC: 141 MMOL/L (ref 136–145)
SP GR UR STRIP: >=1.03 (ref 1–1.03)
TRIGL SERPL-MCNC: 80 MG/DL (ref 0–150)
TROPONIN T SERPL-MCNC: <0.01 NG/ML (ref 0–0.03)
UROBILINOGEN UR QL STRIP: ABNORMAL
WBC NRBC COR # BLD: 13.97 10*3/MM3 (ref 3.4–10.8)
WBC NRBC COR # BLD: 15.09 10*3/MM3 (ref 3.4–10.8)
WHOLE BLOOD HOLD SPECIMEN: NORMAL
WHOLE BLOOD HOLD SPECIMEN: NORMAL

## 2020-03-07 PROCEDURE — 74177 CT ABD & PELVIS W/CONTRAST: CPT

## 2020-03-07 PROCEDURE — 36415 COLL VENOUS BLD VENIPUNCTURE: CPT | Performed by: NURSE PRACTITIONER

## 2020-03-07 PROCEDURE — 25010000002 ONDANSETRON PER 1 MG: Performed by: NURSE PRACTITIONER

## 2020-03-07 PROCEDURE — 25010000002 ENOXAPARIN PER 10 MG: Performed by: HOSPITALIST

## 2020-03-07 PROCEDURE — 82962 GLUCOSE BLOOD TEST: CPT

## 2020-03-07 PROCEDURE — 25010000002 PROMETHAZINE PER 50 MG: Performed by: EMERGENCY MEDICINE

## 2020-03-07 PROCEDURE — 25010000002 PROMETHAZINE PER 50 MG: Performed by: HOSPITALIST

## 2020-03-07 PROCEDURE — 93010 ELECTROCARDIOGRAM REPORT: CPT | Performed by: INTERNAL MEDICINE

## 2020-03-07 PROCEDURE — 85027 COMPLETE CBC AUTOMATED: CPT | Performed by: NURSE PRACTITIONER

## 2020-03-07 PROCEDURE — 93005 ELECTROCARDIOGRAM TRACING: CPT | Performed by: EMERGENCY MEDICINE

## 2020-03-07 PROCEDURE — 83036 HEMOGLOBIN GLYCOSYLATED A1C: CPT | Performed by: HOSPITALIST

## 2020-03-07 PROCEDURE — 84478 ASSAY OF TRIGLYCERIDES: CPT | Performed by: HOSPITALIST

## 2020-03-07 PROCEDURE — 25010000002 HYDROMORPHONE PER 4 MG: Performed by: INTERNAL MEDICINE

## 2020-03-07 PROCEDURE — 83690 ASSAY OF LIPASE: CPT | Performed by: EMERGENCY MEDICINE

## 2020-03-07 PROCEDURE — 85025 COMPLETE CBC W/AUTO DIFF WBC: CPT | Performed by: EMERGENCY MEDICINE

## 2020-03-07 PROCEDURE — 25010000002 HYDROMORPHONE 1 MG/ML SOLUTION: Performed by: EMERGENCY MEDICINE

## 2020-03-07 PROCEDURE — 81003 URINALYSIS AUTO W/O SCOPE: CPT | Performed by: EMERGENCY MEDICINE

## 2020-03-07 PROCEDURE — 99284 EMERGENCY DEPT VISIT MOD MDM: CPT

## 2020-03-07 PROCEDURE — 80053 COMPREHEN METABOLIC PANEL: CPT | Performed by: EMERGENCY MEDICINE

## 2020-03-07 PROCEDURE — 36415 COLL VENOUS BLD VENIPUNCTURE: CPT

## 2020-03-07 PROCEDURE — 80048 BASIC METABOLIC PNL TOTAL CA: CPT | Performed by: NURSE PRACTITIONER

## 2020-03-07 PROCEDURE — 63710000001 INSULIN LISPRO (HUMAN) PER 5 UNITS: Performed by: HOSPITALIST

## 2020-03-07 PROCEDURE — 84484 ASSAY OF TROPONIN QUANT: CPT | Performed by: EMERGENCY MEDICINE

## 2020-03-07 PROCEDURE — 85610 PROTHROMBIN TIME: CPT | Performed by: EMERGENCY MEDICINE

## 2020-03-07 PROCEDURE — 99254 IP/OBS CNSLTJ NEW/EST MOD 60: CPT | Performed by: INTERNAL MEDICINE

## 2020-03-07 PROCEDURE — 25010000002 IOPAMIDOL 61 % SOLUTION: Performed by: EMERGENCY MEDICINE

## 2020-03-07 RX ORDER — PROMETHAZINE HYDROCHLORIDE 25 MG/ML
12.5 INJECTION, SOLUTION INTRAMUSCULAR; INTRAVENOUS ONCE
Status: COMPLETED | OUTPATIENT
Start: 2020-03-07 | End: 2020-03-07

## 2020-03-07 RX ORDER — SODIUM CHLORIDE 0.9 % (FLUSH) 0.9 %
10 SYRINGE (ML) INJECTION AS NEEDED
Status: DISCONTINUED | OUTPATIENT
Start: 2020-03-07 | End: 2020-03-19 | Stop reason: HOSPADM

## 2020-03-07 RX ORDER — NICOTINE POLACRILEX 4 MG
15 LOZENGE BUCCAL
Status: DISCONTINUED | OUTPATIENT
Start: 2020-03-07 | End: 2020-03-19 | Stop reason: HOSPADM

## 2020-03-07 RX ORDER — NITROGLYCERIN 0.4 MG/1
0.4 TABLET SUBLINGUAL
Status: DISCONTINUED | OUTPATIENT
Start: 2020-03-07 | End: 2020-03-19 | Stop reason: HOSPADM

## 2020-03-07 RX ORDER — PROMETHAZINE HYDROCHLORIDE 25 MG/ML
12.5 INJECTION, SOLUTION INTRAMUSCULAR; INTRAVENOUS EVERY 6 HOURS PRN
Status: DISCONTINUED | OUTPATIENT
Start: 2020-03-07 | End: 2020-03-19 | Stop reason: HOSPADM

## 2020-03-07 RX ORDER — ONDANSETRON 2 MG/ML
4 INJECTION INTRAMUSCULAR; INTRAVENOUS EVERY 6 HOURS PRN
Status: DISCONTINUED | OUTPATIENT
Start: 2020-03-07 | End: 2020-03-19 | Stop reason: HOSPADM

## 2020-03-07 RX ORDER — HYDROMORPHONE HYDROCHLORIDE 1 MG/ML
0.5 INJECTION, SOLUTION INTRAMUSCULAR; INTRAVENOUS; SUBCUTANEOUS
Status: DISCONTINUED | OUTPATIENT
Start: 2020-03-07 | End: 2020-03-08

## 2020-03-07 RX ORDER — ACETAMINOPHEN 325 MG/1
650 TABLET ORAL EVERY 4 HOURS PRN
Status: DISCONTINUED | OUTPATIENT
Start: 2020-03-07 | End: 2020-03-19 | Stop reason: HOSPADM

## 2020-03-07 RX ORDER — ACETAMINOPHEN 160 MG/5ML
650 SOLUTION ORAL EVERY 4 HOURS PRN
Status: DISCONTINUED | OUTPATIENT
Start: 2020-03-07 | End: 2020-03-19 | Stop reason: HOSPADM

## 2020-03-07 RX ORDER — METOPROLOL SUCCINATE 100 MG/1
100 TABLET, EXTENDED RELEASE ORAL DAILY
Status: DISCONTINUED | OUTPATIENT
Start: 2020-03-07 | End: 2020-03-12

## 2020-03-07 RX ORDER — ACETAMINOPHEN 650 MG/1
650 SUPPOSITORY RECTAL EVERY 4 HOURS PRN
Status: DISCONTINUED | OUTPATIENT
Start: 2020-03-07 | End: 2020-03-19 | Stop reason: HOSPADM

## 2020-03-07 RX ORDER — BISACODYL 5 MG/1
5 TABLET, DELAYED RELEASE ORAL DAILY PRN
Status: DISCONTINUED | OUTPATIENT
Start: 2020-03-07 | End: 2020-03-19 | Stop reason: HOSPADM

## 2020-03-07 RX ORDER — CALCIUM CARBONATE 200(500)MG
2 TABLET,CHEWABLE ORAL 2 TIMES DAILY PRN
Status: DISCONTINUED | OUTPATIENT
Start: 2020-03-07 | End: 2020-03-19 | Stop reason: HOSPADM

## 2020-03-07 RX ORDER — SODIUM CHLORIDE, SODIUM LACTATE, POTASSIUM CHLORIDE, CALCIUM CHLORIDE 600; 310; 30; 20 MG/100ML; MG/100ML; MG/100ML; MG/100ML
75 INJECTION, SOLUTION INTRAVENOUS CONTINUOUS
Status: DISCONTINUED | OUTPATIENT
Start: 2020-03-07 | End: 2020-03-16

## 2020-03-07 RX ORDER — ONDANSETRON 4 MG/1
4 TABLET, FILM COATED ORAL EVERY 6 HOURS PRN
Status: DISCONTINUED | OUTPATIENT
Start: 2020-03-07 | End: 2020-03-19 | Stop reason: HOSPADM

## 2020-03-07 RX ORDER — SODIUM CHLORIDE 0.9 % (FLUSH) 0.9 %
10 SYRINGE (ML) INJECTION EVERY 12 HOURS SCHEDULED
Status: DISCONTINUED | OUTPATIENT
Start: 2020-03-07 | End: 2020-03-19 | Stop reason: HOSPADM

## 2020-03-07 RX ORDER — DEXTROSE MONOHYDRATE 25 G/50ML
25 INJECTION, SOLUTION INTRAVENOUS
Status: DISCONTINUED | OUTPATIENT
Start: 2020-03-07 | End: 2020-03-19 | Stop reason: HOSPADM

## 2020-03-07 RX ORDER — CYCLOBENZAPRINE HCL 10 MG
10 TABLET ORAL 3 TIMES DAILY PRN
Status: DISCONTINUED | OUTPATIENT
Start: 2020-03-07 | End: 2020-03-19 | Stop reason: HOSPADM

## 2020-03-07 RX ORDER — SODIUM CHLORIDE 9 MG/ML
125 INJECTION, SOLUTION INTRAVENOUS CONTINUOUS
Status: DISCONTINUED | OUTPATIENT
Start: 2020-03-07 | End: 2020-03-08

## 2020-03-07 RX ADMIN — HYDROMORPHONE HYDROCHLORIDE 0.5 MG: 1 INJECTION, SOLUTION INTRAMUSCULAR; INTRAVENOUS; SUBCUTANEOUS at 08:22

## 2020-03-07 RX ADMIN — ONDANSETRON 4 MG: 2 INJECTION INTRAMUSCULAR; INTRAVENOUS at 17:25

## 2020-03-07 RX ADMIN — SODIUM CHLORIDE, PRESERVATIVE FREE 10 ML: 5 INJECTION INTRAVENOUS at 08:21

## 2020-03-07 RX ADMIN — INSULIN LISPRO 2 UNITS: 100 INJECTION, SOLUTION INTRAVENOUS; SUBCUTANEOUS at 17:35

## 2020-03-07 RX ADMIN — HYDROMORPHONE HYDROCHLORIDE 0.5 MG: 1 INJECTION, SOLUTION INTRAMUSCULAR; INTRAVENOUS; SUBCUTANEOUS at 20:06

## 2020-03-07 RX ADMIN — CYCLOBENZAPRINE 10 MG: 10 TABLET, FILM COATED ORAL at 23:26

## 2020-03-07 RX ADMIN — SODIUM CHLORIDE 1000 ML: 9 INJECTION, SOLUTION INTRAVENOUS at 05:24

## 2020-03-07 RX ADMIN — HYDROMORPHONE HYDROCHLORIDE 0.5 MG: 1 INJECTION, SOLUTION INTRAMUSCULAR; INTRAVENOUS; SUBCUTANEOUS at 23:02

## 2020-03-07 RX ADMIN — ACETAMINOPHEN 650 MG: 325 TABLET, FILM COATED ORAL at 23:26

## 2020-03-07 RX ADMIN — PROMETHAZINE HYDROCHLORIDE 12.5 MG: 25 INJECTION INTRAMUSCULAR; INTRAVENOUS at 14:04

## 2020-03-07 RX ADMIN — SODIUM CHLORIDE 125 ML/HR: 9 INJECTION, SOLUTION INTRAVENOUS at 05:23

## 2020-03-07 RX ADMIN — PROMETHAZINE HYDROCHLORIDE 12.5 MG: 25 INJECTION INTRAMUSCULAR; INTRAVENOUS at 05:03

## 2020-03-07 RX ADMIN — PROMETHAZINE HYDROCHLORIDE 12.5 MG: 25 INJECTION INTRAMUSCULAR; INTRAVENOUS at 20:06

## 2020-03-07 RX ADMIN — ENOXAPARIN SODIUM 40 MG: 40 INJECTION SUBCUTANEOUS at 14:03

## 2020-03-07 RX ADMIN — METOPROLOL SUCCINATE 100 MG: 100 TABLET, FILM COATED, EXTENDED RELEASE ORAL at 14:03

## 2020-03-07 RX ADMIN — HYDROMORPHONE HYDROCHLORIDE 0.5 MG: 1 INJECTION, SOLUTION INTRAMUSCULAR; INTRAVENOUS; SUBCUTANEOUS at 17:25

## 2020-03-07 RX ADMIN — HYDROMORPHONE HYDROCHLORIDE 0.5 MG: 1 INJECTION, SOLUTION INTRAMUSCULAR; INTRAVENOUS; SUBCUTANEOUS at 11:46

## 2020-03-07 RX ADMIN — IOPAMIDOL 85 ML: 612 INJECTION, SOLUTION INTRAVENOUS at 05:11

## 2020-03-07 RX ADMIN — SODIUM CHLORIDE, POTASSIUM CHLORIDE, SODIUM LACTATE AND CALCIUM CHLORIDE 150 ML/HR: 600; 310; 30; 20 INJECTION, SOLUTION INTRAVENOUS at 21:36

## 2020-03-07 RX ADMIN — CYCLOBENZAPRINE 10 MG: 10 TABLET, FILM COATED ORAL at 14:04

## 2020-03-07 RX ADMIN — SODIUM CHLORIDE, PRESERVATIVE FREE 10 ML: 5 INJECTION INTRAVENOUS at 20:06

## 2020-03-07 RX ADMIN — SODIUM CHLORIDE, POTASSIUM CHLORIDE, SODIUM LACTATE AND CALCIUM CHLORIDE 150 ML/HR: 600; 310; 30; 20 INJECTION, SOLUTION INTRAVENOUS at 08:21

## 2020-03-07 RX ADMIN — INSULIN LISPRO 2 UNITS: 100 INJECTION, SOLUTION INTRAVENOUS; SUBCUTANEOUS at 21:36

## 2020-03-07 RX ADMIN — ONDANSETRON 4 MG: 2 INJECTION INTRAMUSCULAR; INTRAVENOUS at 08:22

## 2020-03-07 RX ADMIN — HYDROMORPHONE HYDROCHLORIDE 1 MG: 1 INJECTION, SOLUTION INTRAMUSCULAR; INTRAVENOUS; SUBCUTANEOUS at 05:03

## 2020-03-07 RX ADMIN — INSULIN LISPRO 3 UNITS: 100 INJECTION, SOLUTION INTRAVENOUS; SUBCUTANEOUS at 14:03

## 2020-03-07 NOTE — ED PROVIDER NOTES
" EMERGENCY DEPARTMENT ENCOUNTER    CHIEF COMPLAINT  Chief Complaint: back pain  History given by: patient  History limited by: none  Room Number: 15/15  PMD: Bing Eduardo BRENDA, JUICE      HPI:  Pt is a 54 y.o. male with Hx of HTN and A-fib, who presents complaining of moderate mid back pain, described as \"spasms\" that began around 1800. The pt states that he was getting ready for work and ate something while getting ready. As he was driving, he began to develop the back spasms. The pain progressively worsened and then began to radiate directly through to his epigastric area of his abd. Then around 2100, the pt states that began to develop nausea with 3 episodes of vomiting. He states that his abd pain is constant in nature, whereas his back pain is waxing and waning in nature. No aggravating or alleviating factors. Per pt, he has a Hx of SBO. Hx of cholecystectomy. Pt also notes that he has previously been on Coumadin due to Hx of A-fib, but he stopped taking this himself about a year ago. He is still taking his Metoprolol. Denies diarrhea. Last BM was two days ago, which is not abnormal for the pt. Non-smoker. Denies EtOH use and illicit drug use. There are no other complaints.     PAST MEDICAL HISTORY  Active Ambulatory Problems     Diagnosis Date Noted   • No Active Ambulatory Problems     Resolved Ambulatory Problems     Diagnosis Date Noted   • No Resolved Ambulatory Problems     Past Medical History:   Diagnosis Date   • Acute stress disorder    • Anxiety    • Asthma    • Atrial fibrillation (CMS/HCC)    • Chronic back pain    • Chronic knee pain    • Depression    • Dyslipidemia    • Hypercholesteremia    • Hypertension    • Noncompliance with treatment    • Obesity    • Prediabetes    • Sleep apnea        PAST SURGICAL HISTORY  Past Surgical History:   Procedure Laterality Date   • ABLATION OF DYSRHYTHMIC FOCUS     • CHOLECYSTECTOMY         FAMILY HISTORY  Family History   Problem Relation Age of Onset   • " Heart disease Father    • Heart disease Paternal Grandfather    • Stroke Other    • Cancer Other    • Stroke Mother        SOCIAL HISTORY  Social History     Socioeconomic History   • Marital status: Single     Spouse name: Not on file   • Number of children: Not on file   • Years of education: Not on file   • Highest education level: Not on file   Tobacco Use   • Smoking status: Never Smoker   Substance and Sexual Activity   • Alcohol use: No       ALLERGIES  Latex    REVIEW OF SYSTEMS  Review of Systems   Constitutional: Negative for chills and fever.   HENT: Negative for sore throat and trouble swallowing.    Eyes: Negative for visual disturbance.   Respiratory: Negative for cough and shortness of breath.    Cardiovascular: Negative for chest pain and leg swelling.   Gastrointestinal: Positive for abdominal pain (epigastric), nausea and vomiting. Negative for diarrhea.   Endocrine: Negative.    Genitourinary: Negative for decreased urine volume and frequency.   Musculoskeletal: Positive for back pain (mid back). Negative for neck pain.   Skin: Negative for rash.   Allergic/Immunologic: Negative.    Neurological: Negative for weakness and numbness.   Hematological: Negative.    Psychiatric/Behavioral: Negative.    All other systems reviewed and are negative.      PHYSICAL EXAM  ED Triage Vitals   Temp Heart Rate Resp BP SpO2   03/07/20 0210 03/07/20 0210 03/07/20 0210 03/07/20 0231 03/07/20 0210   95.6 °F (35.3 °C) 87 16 (!) 203/97 97 %      Temp src Heart Rate Source Patient Position BP Location FiO2 (%)   03/07/20 0210 03/07/20 0210 -- -- --   Tympanic Monitor          Physical Exam   Constitutional: He is oriented to person, place, and time. He appears distressed.   HENT:   Head: Normocephalic and atraumatic.   Eyes: EOM are normal.   Neck: Normal range of motion.   Cardiovascular: Normal rate, regular rhythm and normal heart sounds.   No murmur heard.  Pulses:       Posterior tibial pulses are 2+ on the right  side, and 2+ on the left side.   Pulmonary/Chest: Effort normal and breath sounds normal. No respiratory distress. He has no wheezes.   Abdominal: Soft. Bowel sounds are normal. There is generalized tenderness (mild, worse in LUQ). There is no rebound and no guarding.   Musculoskeletal: Normal range of motion. He exhibits no edema.   Neurological: He is alert and oriented to person, place, and time.   Skin: Skin is warm and dry.   Psychiatric: Affect normal.   Nursing note and vitals reviewed.      LAB RESULTS  Lab Results (last 24 hours)     Procedure Component Value Units Date/Time    Urinalysis With Microscopic If Indicated (No Culture) - Urine, Clean Catch [[id number]]  (Abnormal) Collected:  03/07/20 0252    Specimen:  Urine, Clean Catch Updated:  03/07/20 0413     Color, UA Yellow     Appearance, UA Clear     pH, UA <=5.0     Specific Gravity, UA >=1.030     Glucose, UA >=1000 mg/dL (3+)     Ketones, UA 15 mg/dL (1+)     Bilirubin, UA Negative     Blood, UA Negative     Protein, UA Negative     Leuk Esterase, UA Negative     Nitrite, UA Negative     Urobilinogen, UA 0.2 E.U./dL    Narrative:       Urine microscopic not indicated.    CBC & Differential [[id number]] Collected:  03/07/20 0401    Specimen:  Blood Updated:  03/07/20 0418    Narrative:       The following orders were created for panel order CBC & Differential.  Procedure                               Abnormality         Status                     ---------                               -----------         ------                     CBC Auto Differential[[id number]]        Abnormal            Final result                 Please view results for these tests on the individual orders.    Comprehensive Metabolic Panel [[id number]]  (Abnormal) Collected:  03/07/20 0401    Specimen:  Blood Updated:  03/07/20 0435     Glucose 346 mg/dL      BUN 18 mg/dL      Creatinine 1.04 mg/dL      Sodium 140 mmol/L      Potassium 4.7 mmol/L      Chloride 103 mmol/L      CO2  21.2 mmol/L      Calcium 9.3 mg/dL      Total Protein 7.0 g/dL      Albumin 4.60 g/dL      ALT (SGPT) 74 U/L      AST (SGOT) 58 U/L      Alkaline Phosphatase 62 U/L      Total Bilirubin 0.6 mg/dL      eGFR Non African Amer 74 mL/min/1.73      Globulin 2.4 gm/dL      A/G Ratio 1.9 g/dL      BUN/Creatinine Ratio 17.3     Anion Gap 15.8 mmol/L     Narrative:       GFR Normal >60  Chronic Kidney Disease <60  Kidney Failure <15      Lipase [[id number]]  (Abnormal) Collected:  03/07/20 0401    Specimen:  Blood Updated:  03/07/20 0450     Lipase >3,000 U/L     Protime-INR [[id number]]  (Normal) Collected:  03/07/20 0401    Specimen:  Blood Updated:  03/07/20 0427     Protime 13.7 Seconds      INR 1.08    CBC Auto Differential [[id number]]  (Abnormal) Collected:  03/07/20 0401    Specimen:  Blood Updated:  03/07/20 0418     WBC 15.09 10*3/mm3      RBC 5.07 10*6/mm3      Hemoglobin 16.5 g/dL      Hematocrit 47.1 %      MCV 92.9 fL      MCH 32.5 pg      MCHC 35.0 g/dL      RDW 13.8 %      RDW-SD 47.1 fl      MPV 9.6 fL      Platelets 163 10*3/mm3      Neutrophil % 88.6 %      Lymphocyte % 7.6 %      Monocyte % 3.3 %      Eosinophil % 0.0 %      Basophil % 0.1 %      Immature Grans % 0.4 %      Neutrophils, Absolute 13.36 10*3/mm3      Lymphocytes, Absolute 1.15 10*3/mm3      Monocytes, Absolute 0.50 10*3/mm3      Eosinophils, Absolute 0.00 10*3/mm3      Basophils, Absolute 0.02 10*3/mm3      Immature Grans, Absolute 0.06 10*3/mm3      nRBC 0.0 /100 WBC     Troponin [[id number]]  (Normal) Collected:  03/07/20 0401    Specimen:  Blood Updated:  03/07/20 0435     Troponin T <0.010 ng/mL     Narrative:       Troponin T Reference Range:  <= 0.03 ng/mL-   Negative for AMI  >0.03 ng/mL-     Abnormal for myocardial necrosis.  Clinicians would have to utilize clinical acumen, EKG, Troponin and serial changes to determine if it is an Acute Myocardial Infarction or myocardial injury due to an underlying chronic condition.       Results may  be falsely decreased if patient taking Biotin.            I ordered the above labs and reviewed the results    RADIOLOGY  Ct Abdomen Pelvis With Contrast    Result Date: 3/7/2020  CT OF THE ABDOMEN AND PELVIS WITH CONTRAST  HISTORY: Moderate mid abdominal and back pain  COMPARISON: None available.  TECHNIQUE: Axial CT imaging was obtained from the dome the diaphragm to the symphysis pubis. IV contrast was administered.  FINDINGS: Images through the lung bases demonstrate mild dependent atelectasis. No focal hepatic lesions are seen. The patient is noted to have inflammatory stranding around the pancreas. The appearance is suspicious for pancreatitis. While there is some fluid identified in the mesentery, no organized peripancreatic collections are seen. There is no pancreatic ductal dilatation. This process is not resulting in any gastric outlet obstruction. The pancreas enhances normally. The main portal vein is patent, as are the splenic and superior mesenteric veins. There is an area of decreased attenuation which is seen involving the inferior aspect of the spleen. Exact etiology is uncertain. It may represent a lesion such as a cyst, but it does have a somewhat peripheral and wedge-shaped configuration which may represent splenic infarction. Again, however both the splenic artery and splenic vein appear to be patent. The gallbladder is surgically absent. The adrenal glands are within normal limits. Kidneys enhance symmetrically. There is a simple appearing left renal cyst. No additional follow-up is necessary. The appendix appears unremarkable. There is no hydronephrosis. No distal ureteral or bladder stones are seen. Urinary bladder and prostate gland are normal. There is no evidence of mechanical bowel obstruction. No acute osseous abnormalities are seen.       1. Inflammatory stranding is seen surrounding the pancreas, suspicious for pancreatitis. While there is some fluid identified within the upper  abdomen, no organized peripancreatic collections are seen. The pancreas enhances normally. 2. Area of decreased attenuation involving the inferior aspect of the spleen. Small splenic infarction is not excluded.  Radiation dose reduction techniques were utilized, including automated exposure control and exposure modulation based on body size.  This report was finalized on 3/7/2020 5:42 AM by Dr. Brianna Soliz M.D.        I ordered the above noted radiological studies. Reviewed by me. See dictation for official radiology interpretation.      PROCEDURES  Procedures  EKG          EKG time: 0350  Rhythm/Rate: NSR 60s  P waves and [place]: LAE  QRS, axis: unremarkable   ST and T waves: nonspecific ST and T wave findings diffusely     Interpreted Contemporaneously by me, independently viewed  Improved when compared to prior done on 7/10/17      PROGRESS AND CONSULTS       0458- Lipase >3,000. Phenergan, IVF, and Dilaudid ordered. Placed call to [facility].     0534- Discussed pt with JUICE Colmenares for Dr. Zhang ([facility]). She agrees to admit the pt.     0544- Rechecked pt. Pt is resting comfortably. His pain is improved with medication. I informed the pt of his lab, EKG, and CT abd results. Specifically, I informed the pt of his elevated BGL and his Lipase of >3,000. Due to this, I discussed the plan to admit the pt to [facility] for further evaluation and treatment. Pt understands and agrees with the plan, all questions answered.    MEDICAL DECISION MAKING  Results were reviewed/discussed with the patient and they were also made aware of online access. Pt also made aware that some labs, such as cultures, will not be resulted during ER visit and follow up with PMD is necessary.     MDM  Number of Diagnoses or Management Options  Acute pancreatitis, unspecified complication status, unspecified pancreatitis type:   Essential hypertension:   Hyperglycemia:      Amount and/or Complexity of Data Reviewed  Clinical lab tests: ordered and  reviewed (Lipase >3,000)  Tests in the radiology section of CPT®: ordered and reviewed (CT abd-   1. Inflammatory stranding is seen surrounding the pancreas, suspicious for pancreatitis. While there is some fluid identified within the upper abdomen, no organized peripancreatic collections are seen. The pancreas enhances normally. 2. Area of decreased attenuation involving the inferior aspect of the spleen. Small splenic infarction is not excluded.  Radiation dose reduction techniques were utilized, including automated exposure control and exposure modulation based on body size. )  Tests in the medicine section of CPT®: ordered and reviewed (See EKG note.)  Decide to obtain previous medical records or to obtain history from someone other than the patient: yes  Discuss the patient with other providers: yes (JUICE Colmenares for Dr. Zhang ([facility]))  Independent visualization of images, tracings, or specimens: yes    Patient Progress  Patient progress: stable         DIAGNOSIS  Final diagnoses:   Acute pancreatitis, unspecified complication status, unspecified pancreatitis type   Hyperglycemia   Essential hypertension       DISPOSITION  ADMISSION    Discussed treatment plan and reason for admission with pt/family and admitting physician.  Pt/family voiced understanding of the plan for admission for further testing/treatment as needed.     Latest Documented Vital Signs:  As of 5:46 AM  BP- (!) 193/94 HR- 73 Temp- 95.6 °F (35.3 °C) (Tympanic) O2 sat- 97%    --  Documentation assistance provided by brandi Arvizu for Dr. Lela MD.  Information recorded by the scribe was done at my direction and has been verified and validated by me.     Travis Arvizu  03/07/20 4172       Charmaine Vogel MD  03/10/20 6309

## 2020-03-07 NOTE — H&P
HISTORY AND PHYSICAL   [facility]        Patient Identification:  Name: Cesar Vann  Age: 54 y.o.  Sex: male  :  1965  MRN: [id number]                     Primary Care Physician: Bing Eduardo APRN    Chief Complaint:  Abdominal pain with nausea and vomiting    History of Present Illness:        The patient is a 54-year-old white male with history of anxiety, asthma, paroxysmal atrial fib, chronic back pain, depression, hyperlipidemia, hypertension, diabetes and sleep apnea who was admitted with a couple day history of back pain and abdominal pain.  The patient had some nausea and vomiting associated with the abdominal pain and back pain.  The patient came to ER for evaluation and was found to have elevated lipase around 3000 and CT scan showed changes consistent with acute pancreatitis.  The patient was started on some IV fluids and given medication for pain and nausea and admitted for further evaluation and treatment of pancreatitis.    Past Medical History:  Past Medical History:   Diagnosis Date   • Acute stress disorder    • Anxiety    • Asthma    • Atrial fibrillation (CMS/HCC)    • Chronic back pain    • Chronic knee pain    • Depression    • Dyslipidemia    • Hypercholesteremia    • Hypertension    • Noncompliance with treatment    • Obesity    • Prediabetes    • Sleep apnea      Past Surgical History:  Past Surgical History:   Procedure Laterality Date   • ABLATION OF DYSRHYTHMIC FOCUS     • CHOLECYSTECTOMY        Home Meds:  Medications Prior to Admission   Medication Sig Dispense Refill Last Dose   • metoprolol succinate XL (TOPROL-XL) 100 MG 24 hr tablet Take 1 tablet by mouth Daily. 30 tablet 1 3/7/2020 at Unknown time   • ARIPiprazole (ABILIFY) 20 MG tablet Take 20 mg by mouth Daily.   Taking   • atorvastatin (LIPITOR) 40 MG tablet Take 40 mg by mouth Daily.   Taking   • cyclobenzaprine (FLEXERIL) 10 MG tablet Take 10 mg by mouth 3 (Three) Times a Day As Needed for  Muscle Spasms.   Taking   • mirtazapine (REMERON SOL-TAB) 45 MG disintegrating tablet Take 45 mg by mouth Every Night.   Taking   • sertraline (ZOLOFT) 100 MG tablet Take 100 mg by mouth Daily.   Taking   • warfarin (COUMADIN) 1 MG tablet Take 1 mg by mouth Daily.   Taking   • warfarin (COUMADIN) 3 MG tablet Take 3 mg by mouth Daily.   Taking     Current meds    Current Facility-Administered Medications:   •  acetaminophen (TYLENOL) tablet 650 mg, 650 mg, Oral, Q4H PRN **OR** acetaminophen (TYLENOL) 160 MG/5ML solution 650 mg, 650 mg, Oral, Q4H PRN **OR** acetaminophen (TYLENOL) suppository 650 mg, 650 mg, Rectal, Q4H PRN, Dedra Ruff APRN  •  bisacodyl (DULCOLAX) EC tablet 5 mg, 5 mg, Oral, Daily PRN, Dedra Ruff APRN  •  calcium carbonate (TUMS) chewable tablet 500 mg (200 mg elemental), 2 tablet, Oral, BID PRN, Dedra Ruff APRN  •  HYDROmorphone (DILAUDID) injection 0.5 mg, 0.5 mg, Intravenous, Q2H PRN, Sher Zhang MD, 0.5 mg at 03/07/20 1146  •  lactated ringers infusion, 150 mL/hr, Intravenous, Continuous, Dedra Ruff APRN, Last Rate: 150 mL/hr at 03/07/20 0821, 150 mL/hr at 03/07/20 0821  •  nitroglycerin (NITROSTAT) SL tablet 0.4 mg, 0.4 mg, Sublingual, Q5 Min PRN, Dedra Ruff APRN  •  ondansetron (ZOFRAN) tablet 4 mg, 4 mg, Oral, Q6H PRN **OR** ondansetron (ZOFRAN) injection 4 mg, 4 mg, Intravenous, Q6H PRN, Dedra Ruff APRN, 4 mg at 03/07/20 0822  •  sodium chloride 0.9 % flush 10 mL, 10 mL, Intravenous, PRN, Charmaine Vogel MD  •  sodium chloride 0.9 % flush 10 mL, 10 mL, Intravenous, Q12H, Dedra Ruff APRN, 10 mL at 03/07/20 0821  •  sodium chloride 0.9 % flush 10 mL, 10 mL, Intravenous, PRN, Dedra Ruff APRN  •  sodium chloride 0.9 % infusion, 125 mL/hr, Intravenous, Continuous, Charmaine Vogel MD, Last Rate: 125 mL/hr at 03/07/20 0523, 125 mL/hr at 03/07/20 0523  Allergies:  Allergies   Allergen Reactions   • Latex  "Itching     Immunizations:    There is no immunization history on file for this patient.  Social History:   Social History     Social History Narrative   • Not on file     Social History     Socioeconomic History   • Marital status: Single     Spouse name: Not on file   • Number of children: Not on file   • Years of education: Not on file   • Highest education level: Not on file   Tobacco Use   • Smoking status: Never Smoker   Substance and Sexual Activity   • Alcohol use: No       Family History:  Family History   Problem Relation Age of Onset   • Heart disease Father    • Heart disease Paternal Grandfather    • Stroke Other    • Cancer Other    • Stroke Mother         Review of Systems  See history of present illness and past medical history.  Patient denies headache, dizziness, syncope, falls, trauma, change in vision, change in hearing, change in taste, changes in weight, changes in appetite, focal weakness, numbness, or paresthesia.  Patient denies chest pain, palpitations, dyspnea, orthopnea, PND, cough, sinus pressure, rhinorrhea, epistaxis, hemoptysis,  hematemesis, diarrhea, constipation or hematchezia.  Denies cold or heat intolerance, polydipsia, polyuria, polyphagia. Denies hematuria, pyuria, dysuria, hesitancy, frequency or urgency.  Denies fever, chills, sweats, night sweats.   Remainder of ROS is negative.    Objective:  tMax 24 hrs: Temp (24hrs), Av.4 °F (36.3 °C), Min:95.6 °F (35.3 °C), Max:99.2 °F (37.3 °C)    Vitals Ranges:   Temp:  [95.6 °F (35.3 °C)-99.2 °F (37.3 °C)] 99.2 °F (37.3 °C)  Heart Rate:  [72-90] 90  Resp:  [16-18] 18  BP: (181-203)/(89-97) 182/89      Exam:  BP (!) 182/89 (BP Location: Left arm, Patient Position: Lying)   Pulse 90   Temp 99.2 °F (37.3 °C) (Oral)   Resp 18   Ht 180.3 cm (71\")   Wt 122 kg (268 lb 8 oz)   SpO2 95%   BMI 37.45 kg/m²     General Appearance:    Alert, cooperative, no distress, appears stated age   Head:    Normocephalic, without obvious " abnormality, atraumatic   Eyes:    PERRL, conjunctiva/corneas clear, EOM's intact, both eyes   Ears:    Normal external ear canals, both ears   Nose:   Nares normal, septum midline, mucosa normal, no drainage    or sinus tenderness   Throat:   Lips, mucosa, and tongue normal   Neck:   Supple, symmetrical, trachea midline, no adenopathy;     thyroid:  no enlargement/tenderness/nodules; no carotid    bruit or JVD   Back:     Symmetric, no curvature, ROM normal, no CVA tenderness   Lungs:     Clear to auscultation bilaterally, respirations unlabored   Chest Wall:    No tenderness or deformity    Heart:    Regular rate and rhythm, S1 and S2 normal, no murmur, rub   or gallop   Abdomen:     Soft, mild upper abdominal tenderness, bowel sounds active all four quadrants,     no masses, no hepatomegaly, no splenomegaly   Extremities:   Extremities normal, atraumatic, no cyanosis or edema   Pulses:   2+ and symmetric all extremities   Skin:   Skin color, texture, turgor normal, no rashes or lesions   Lymph nodes:   Cervical, supraclavicular, and axillary nodes normal   Neurologic:   CNII-XII intact, normal strength, sensation intact throughout      .    Data Review:  Lab Results (last 72 hours)     Procedure Component Value Units Date/Time    Basic Metabolic Panel [[id number]]  (Abnormal) Collected:  03/07/20 0802    Specimen:  Blood Updated:  03/07/20 0905     Glucose 263 mg/dL      BUN 18 mg/dL      Creatinine 1.06 mg/dL      Sodium 141 mmol/L      Potassium 4.7 mmol/L      Chloride 105 mmol/L      CO2 23.7 mmol/L      Calcium 8.5 mg/dL      eGFR Non African Amer 73 mL/min/1.73      BUN/Creatinine Ratio 17.0     Anion Gap 12.3 mmol/L     Narrative:       GFR Normal >60  Chronic Kidney Disease <60  Kidney Failure <15      CBC (No Diff) [[id number]]  (Abnormal) Collected:  03/07/20 0802    Specimen:  Blood Updated:  03/07/20 0838     WBC 13.97 10*3/mm3      RBC 4.92 10*6/mm3      Hemoglobin 15.3 g/dL      Hematocrit 44.3 %       MCV 90.0 fL      MCH 31.1 pg      MCHC 34.5 g/dL      RDW 13.1 %      RDW-SD 42.7 fl      MPV 9.4 fL      Platelets 171 10*3/mm3     [facility]olph Draw [[id number]] Collected:  03/07/20 0401    Specimen:  Blood Updated:  03/07/20 0515    Narrative:       The following orders were created for panel order [facility] Draw.  Procedure                               Abnormality         Status                     ---------                               -----------         ------                     Light Blue Top[[id number]]                                   Final result               Green Top (Gel)[[id number]]                                  Final result               Lavender Top[[id number]]                                     Final result               Gold Top - SST[[id number]]                                   Final result                 Please view results for these tests on the individual orders.    Gold Top - SST [[id number]] Collected:  03/07/20 0401    Specimen:  Blood Updated:  03/07/20 0515     Extra Tube Hold for add-ons.     Comment: Auto resulted.       Light Blue Top [[id number]] Collected:  03/07/20 0401    Specimen:  Blood Updated:  03/07/20 0515     Extra Tube hold for add-on     Comment: Auto resulted       Green Top (Gel) [[id number]] Collected:  03/07/20 0401    Specimen:  Blood Updated:  03/07/20 0515     Extra Tube Hold for add-ons.     Comment: Auto resulted.       Lavender Top [[id number]] Collected:  03/07/20 0401    Specimen:  Blood Updated:  03/07/20 0515     Extra Tube hold for add-on     Comment: Auto resulted       Lipase [[id number]]  (Abnormal) Collected:  03/07/20 0401    Specimen:  Blood Updated:  03/07/20 0450     Lipase >3,000 U/L     Comprehensive Metabolic Panel [[id number]]  (Abnormal) Collected:  03/07/20 0401    Specimen:  Blood Updated:  03/07/20 0435     Glucose 346 mg/dL      BUN 18 mg/dL      Creatinine 1.04 mg/dL      Sodium 140 mmol/L      Potassium 4.7 mmol/L      Chloride 103 mmol/L       CO2 21.2 mmol/L      Calcium 9.3 mg/dL      Total Protein 7.0 g/dL      Albumin 4.60 g/dL      ALT (SGPT) 74 U/L      AST (SGOT) 58 U/L      Alkaline Phosphatase 62 U/L      Total Bilirubin 0.6 mg/dL      eGFR Non African Amer 74 mL/min/1.73      Globulin 2.4 gm/dL      A/G Ratio 1.9 g/dL      BUN/Creatinine Ratio 17.3     Anion Gap 15.8 mmol/L     Narrative:       GFR Normal >60  Chronic Kidney Disease <60  Kidney Failure <15      Troponin [[id number]]  (Normal) Collected:  03/07/20 0401    Specimen:  Blood Updated:  03/07/20 0435     Troponin T <0.010 ng/mL     Narrative:       Troponin T Reference Range:  <= 0.03 ng/mL-   Negative for AMI  >0.03 ng/mL-     Abnormal for myocardial necrosis.  Clinicians would have to utilize clinical acumen, EKG, Troponin and serial changes to determine if it is an Acute Myocardial Infarction or myocardial injury due to an underlying chronic condition.       Results may be falsely decreased if patient taking Biotin.      Protime-INR [[id number]]  (Normal) Collected:  03/07/20 0401    Specimen:  Blood Updated:  03/07/20 0427     Protime 13.7 Seconds      INR 1.08    CBC & Differential [[id number]] Collected:  03/07/20 0401    Specimen:  Blood Updated:  03/07/20 0418    Narrative:       The following orders were created for panel order CBC & Differential.  Procedure                               Abnormality         Status                     ---------                               -----------         ------                     CBC Auto Differential[[id number]]        Abnormal            Final result                 Please view results for these tests on the individual orders.    CBC Auto Differential [[id number]]  (Abnormal) Collected:  03/07/20 0401    Specimen:  Blood Updated:  03/07/20 0418     WBC 15.09 10*3/mm3      RBC 5.07 10*6/mm3      Hemoglobin 16.5 g/dL      Hematocrit 47.1 %      MCV 92.9 fL      MCH 32.5 pg      MCHC 35.0 g/dL      RDW 13.8 %      RDW-SD 47.1 fl      MPV 9.6  fL      Platelets 163 10*3/mm3      Neutrophil % 88.6 %      Lymphocyte % 7.6 %      Monocyte % 3.3 %      Eosinophil % 0.0 %      Basophil % 0.1 %      Immature Grans % 0.4 %      Neutrophils, Absolute 13.36 10*3/mm3      Lymphocytes, Absolute 1.15 10*3/mm3      Monocytes, Absolute 0.50 10*3/mm3      Eosinophils, Absolute 0.00 10*3/mm3      Basophils, Absolute 0.02 10*3/mm3      Immature Grans, Absolute 0.06 10*3/mm3      nRBC 0.0 /100 WBC     Urinalysis With Microscopic If Indicated (No Culture) - Urine, Clean Catch [[id number]]  (Abnormal) Collected:  03/07/20 0252    Specimen:  Urine, Clean Catch Updated:  03/07/20 0413     Color, UA Yellow     Appearance, UA Clear     pH, UA <=5.0     Specific Gravity, UA >=1.030     Glucose, UA >=1000 mg/dL (3+)     Ketones, UA 15 mg/dL (1+)     Bilirubin, UA Negative     Blood, UA Negative     Protein, UA Negative     Leuk Esterase, UA Negative     Nitrite, UA Negative     Urobilinogen, UA 0.2 E.U./dL    Narrative:       Urine microscopic not indicated.                   Imaging Results (All)     Procedure Component Value Units Date/Time    CT Abdomen Pelvis With Contrast [[id number]] Collected:  03/07/20 0533     Updated:  03/07/20 0545    Narrative:       CT OF THE ABDOMEN AND PELVIS WITH CONTRAST     HISTORY: Moderate mid abdominal and back pain     COMPARISON: None available.     TECHNIQUE: Axial CT imaging was obtained from the dome the diaphragm to  the symphysis pubis. IV contrast was administered.     FINDINGS:  Images through the lung bases demonstrate mild dependent atelectasis. No  focal hepatic lesions are seen. The patient is noted to have  inflammatory stranding around the pancreas. The appearance is suspicious  for pancreatitis. While there is some fluid identified in the mesentery,  no organized peripancreatic collections are seen. There is no pancreatic  ductal dilatation. This process is not resulting in any gastric outlet  obstruction. The pancreas enhances  normally. The main portal vein is  patent, as are the splenic and superior mesenteric veins. There is an  area of decreased attenuation which is seen involving the inferior  aspect of the spleen. Exact etiology is uncertain. It may represent a  lesion such as a cyst, but it does have a somewhat peripheral and  wedge-shaped configuration which may represent splenic infarction.  Again, however both the splenic artery and splenic vein appear to be  patent. The gallbladder is surgically absent. The adrenal glands are  within normal limits. Kidneys enhance symmetrically. There is a simple  appearing left renal cyst. No additional follow-up is necessary. The  appendix appears unremarkable. There is no hydronephrosis. No distal  ureteral or bladder stones are seen. Urinary bladder and prostate gland  are normal. There is no evidence of mechanical bowel obstruction. No  acute osseous abnormalities are seen.       Impression:          1. Inflammatory stranding is seen surrounding the pancreas, suspicious  for pancreatitis. While there is some fluid identified within the upper  abdomen, no organized peripancreatic collections are seen. The pancreas  enhances normally.  2. Area of decreased attenuation involving the inferior aspect of the  spleen. Small splenic infarction is not excluded.     Radiation dose reduction techniques were utilized, including automated  exposure control and exposure modulation based on body size.     This report was finalized on 3/7/2020 5:42 AM by Dr. Brianna Soliz M.D.           Past Medical History:   Diagnosis Date   • Acute stress disorder    • Anxiety    • Asthma    • Atrial fibrillation (CMS/HCC)    • Chronic back pain    • Chronic knee pain    • Depression    • Dyslipidemia    • Hypercholesteremia    • Hypertension    • Noncompliance with treatment    • Obesity    • Prediabetes    • Sleep apnea        Assessment:  Active Hospital Problems    Diagnosis  POA   • **Acute pancreatitis  [K85.90]  Yes   • Essential hypertension [I10]  Unknown   • Hyperglycemia [R73.9]  Unknown   • Sleep apnea [G47.30]  Unknown   • Hypercholesteremia [E78.00]  Unknown   • Asthma [J45.909]  Unknown      Resolved Hospital Problems   No resolved problems to display.       Plan:  The patient admitted to the hospital and will continue with bowel rest.  We will give IV fluid for hydration and medication for pain and nausea.  Will check hemoglobin A1c and Give sliding scale insulin.  Will ask for GI consult for further evaluation and treatment.  Kei Arnold MD  3/7/2020  11:55 AM

## 2020-03-07 NOTE — PLAN OF CARE
Problem: Patient Care Overview  Goal: Plan of Care Review  Outcome: Ongoing (interventions implemented as appropriate)  Flowsheets (Taken 3/7/2020 0700)  Progress: no change  Plan of Care Reviewed With: patient  Outcome Summary: Pt admitted to unit during shift. No c/o pain or nausea at this time. A&Ox4. Ad gaye in room. BP elevated. Rest of VSS. No s/s of distress at this time. Will continue to monitor.  Goal: Individualization and Mutuality  Outcome: Ongoing (interventions implemented as appropriate)  Goal: Discharge Needs Assessment  Outcome: Ongoing (interventions implemented as appropriate)  Goal: Interprofessional Rounds/Family Conf  Outcome: Ongoing (interventions implemented as appropriate)

## 2020-03-07 NOTE — ED NOTES
Pt presents to ED w/cc back spasms and abdominal pain.  Pt states he began having back spasms a couple of days ago that have progressively worsened and have begun to radiate to his abdomen.  + n/v/d x 1 day  Denies fever, recent illness or trauma.  + previous SBO  Pt states he is generally prescribed coumadin for chronic afib, however has not taken for approx. 1 year.     Thao Aguiar, RN  03/07/20 0234

## 2020-03-07 NOTE — CONSULTS
[facility]  Initial Inpatient Consult Note    Referring Provider: Dr. Arnold    Reason for Consultation: Acute pancreatitis    Subjective     History of present illness:    54 y.o. male with no significant GI past medical history except for gallbladder removal 8 years ago for gallstones, presents with epigastric pain radiating in the back.  It is been present for 2 days.  Is the worst pain he is ever had.  It is worse with movement better at rest, worse while ingesting food better while fasting.  It was colicky and stabbing in nature.  There is nausea no vomiting.  No change in bowel habits.  No melena, bright blood per rectum.    His triglycerides have been checked recently tells me they are normal  He consumes very rare alcohol  No familial pancreatitis  Again as mentioned above gallbladder removed 8 years ago for stones    Past Medical History:  Past Medical History:   Diagnosis Date   • Acute stress disorder    • Anxiety    • Asthma    • Atrial fibrillation (CMS/HCC)    • Chronic back pain    • Chronic knee pain    • Depression    • Dyslipidemia    • Hypercholesteremia    • Hypertension    • Noncompliance with treatment    • Obesity    • Prediabetes    • Sleep apnea      Past Surgical History:  Past Surgical History:   Procedure Laterality Date   • ABLATION OF DYSRHYTHMIC FOCUS     • CHOLECYSTECTOMY        Social History:   Social History     Tobacco Use   • Smoking status: Never Smoker   Substance Use Topics   • Alcohol use: No      Family History:  Family History   Problem Relation Age of Onset   • Heart disease Father    • Heart disease Paternal Grandfather    • Stroke Other    • Cancer Other    • Stroke Mother        Home Meds:  Medications Prior to Admission   Medication Sig Dispense Refill Last Dose   • metoprolol succinate XL (TOPROL-XL) 100 MG 24 hr tablet Take 1 tablet by mouth Daily. 30 tablet 1 3/7/2020 at Unknown time   • ARIPiprazole (ABILIFY) 20 MG tablet Take 20 mg by mouth  Daily.   Taking   • atorvastatin (LIPITOR) 40 MG tablet Take 40 mg by mouth Daily.   Taking   • cyclobenzaprine (FLEXERIL) 10 MG tablet Take 10 mg by mouth 3 (Three) Times a Day As Needed for Muscle Spasms.   Taking   • mirtazapine (REMERON SOL-TAB) 45 MG disintegrating tablet Take 45 mg by mouth Every Night.   Taking   • sertraline (ZOLOFT) 100 MG tablet Take 100 mg by mouth Daily.   Taking   • warfarin (COUMADIN) 1 MG tablet Take 1 mg by mouth Daily.   Taking   • warfarin (COUMADIN) 3 MG tablet Take 3 mg by mouth Daily.   Taking     Current Meds:     enoxaparin 40 mg Subcutaneous Q24H   insulin lispro 0-7 Units Subcutaneous 4x Daily With Meals & Nightly   metoprolol succinate  mg Oral Daily   sodium chloride 10 mL Intravenous Q12H     Allergies:  Allergies   Allergen Reactions   • Latex Itching     Review of Systems  He has weakness fatigue all other systems reviewed and negative     Objective     Vital Signs  Temp:  [95.6 °F (35.3 °C)-99.3 °F (37.4 °C)] 99.3 °F (37.4 °C)  Heart Rate:  [72-93] 93  Resp:  [16-18] 16  BP: (181-203)/(74-97) 181/74  Physical Exam:  General Appearance:    Alert, cooperative, in no acute distress   Head:    Normocephalic, without obvious abnormality, atraumatic   Eyes:          conjunctivae and sclerae normal, no   icterus   Throat:   no thrush, oral mucosa moist   Neck:   Supple, no adenopathy   Lungs:     Clear to auscultation bilaterally    Heart:    Regular rhythm and normal rate    Chest Wall:    No abnormalities observed   Abdomen:     Soft, nondistended, nontender; normal bowel sounds   Extremities:   no edema, no redness   Skin:   No bruising or rash   Psychiatric:  normal mood and insight     Results Review:   I reviewed the patient's new clinical results.    Results from last 7 days   Lab Units 03/07/20  0802 03/07/20  0401   WBC 10*3/mm3 13.97* 15.09*   HEMOGLOBIN g/dL 15.3 16.5   HEMATOCRIT % 44.3 47.1   PLATELETS 10*3/mm3 171 163     Results from last 7 days   Lab  Units 03/07/20  0802 03/07/20  0401   SODIUM mmol/L 141 140   POTASSIUM mmol/L 4.7 4.7   CHLORIDE mmol/L 105 103   CO2 mmol/L 23.7 21.2*   BUN mg/dL 18 18   CREATININE mg/dL 1.06 1.04   CALCIUM mg/dL 8.5* 9.3   BILIRUBIN mg/dL  --  0.6   ALK PHOS U/L  --  62   ALT (SGPT) U/L  --  74*   AST (SGOT) U/L  --  58*   GLUCOSE mg/dL 263* 346*     Results from last 7 days   Lab Units 03/07/20  0401   INR  1.08     Lab Results   Lab Value Date/Time    LIPASE >3,000 (H) 03/07/2020 0401       Radiology:  CT Abdomen Pelvis With Contrast   Final Result       1. Inflammatory stranding is seen surrounding the pancreas, suspicious   for pancreatitis. While there is some fluid identified within the upper   abdomen, no organized peripancreatic collections are seen. The pancreas   enhances normally.   2. Area of decreased attenuation involving the inferior aspect of the   spleen. Small splenic infarction is not excluded.       Radiation dose reduction techniques were utilized, including automated   exposure control and exposure modulation based on body size.       This report was finalized on 3/7/2020 5:42 AM by Dr. Brianna Soliz M.D.          MRI abdomen w wo contrast mrcp    (Results Pending)       Assessment/Plan   Patient Active Problem List   Diagnosis   • Acute pancreatitis   • Essential hypertension   • Hyperglycemia   • Sleep apnea   • Hypercholesteremia   • Asthma       Assessment:  1. Acute pancreatitis  2. Cholecystectomy for stones 8 years ago    Plan:  · Check an MRI MRCP to rule out retained stone or ampullary stenosis  · IV fluids per primary team  · Pain control per primary team  · Antiemetics per primary team  · Gut rest      I discussed the patients findings and my recommendations with patient and nursing staff.    Arnel Still MD

## 2020-03-07 NOTE — ED NOTES
Nursing report ED to floor  Cesar Vann  54 y.o.  male    HPI (triage note):   Chief Complaint   Patient presents with   • Abdominal Pain   • Vomiting   • Nausea       Admitting doctor:   Sher Zhang MD    Admitting diagnosis:   The primary encounter diagnosis was Acute pancreatitis, unspecified complication status, unspecified pancreatitis type. Diagnoses of Hyperglycemia and Essential hypertension were also pertinent to this visit.    Code status:   Current Code Status     Date Active Code Status Order ID Comments User Context       3/7/2020 0539 CPR [id number]  Dedra Ruff APRN ED       Questions for Current Code Status     Question Answer Comment    Code Status CPR     Medical Interventions (Level of Support Prior to Arrest) Full           Allergies:   Latex    Weight:       03/07/20  0226   Weight: 122 kg (268 lb 8 oz)       Most recent vitals:   Vitals:    03/07/20 0232 03/07/20 0235 03/07/20 0255 03/07/20 0535   BP:  (!) 193/94  (!) 181/93   Pulse: 73 73 72 89   Resp:       Temp:       TempSrc:       SpO2: 97% 97% 97% 93%   Weight:       Height:           Active LDAs/IV Access:   Lines, Drains & Airways    Active LDAs     Name:   Placement date:   Placement time:   Site:   Days:    Peripheral IV 03/07/20 0401 Right Forearm   03/07/20    0401    Forearm   less than 1                Labs (abnormal labs have a star):   Labs Reviewed   COMPREHENSIVE METABOLIC PANEL - Abnormal; Notable for the following components:       Result Value    Glucose 346 (*)     CO2 21.2 (*)     ALT (SGPT) 74 (*)     AST (SGOT) 58 (*)     Anion Gap 15.8 (*)     All other components within normal limits    Narrative:     GFR Normal >60  Chronic Kidney Disease <60  Kidney Failure <15     LIPASE - Abnormal; Notable for the following components:    Lipase >3,000 (*)     All other components within normal limits   URINALYSIS W/ MICROSCOPIC IF INDICATED (NO CULTURE) - Abnormal; Notable for the following components:     Glucose, UA >=1000 mg/dL (3+) (*)     Ketones, UA 15 mg/dL (1+) (*)     All other components within normal limits    Narrative:     Urine microscopic not indicated.   CBC WITH AUTO DIFFERENTIAL - Abnormal; Notable for the following components:    WBC 15.09 (*)     Neutrophil % 88.6 (*)     Lymphocyte % 7.6 (*)     Monocyte % 3.3 (*)     Eosinophil % 0.0 (*)     Neutrophils, Absolute 13.36 (*)     Immature Grans, Absolute 0.06 (*)     All other components within normal limits   PROTIME-INR - Normal   TROPONIN (IN-HOUSE) - Normal    Narrative:     Troponin T Reference Range:  <= 0.03 ng/mL-   Negative for AMI  >0.03 ng/mL-     Abnormal for myocardial necrosis.  Clinicians would have to utilize clinical acumen, EKG, Troponin and serial changes to determine if it is an Acute Myocardial Infarction or myocardial injury due to an underlying chronic condition.       Results may be falsely decreased if patient taking Biotin.     RAINBOW DRAW    Narrative:     The following orders were created for panel order [facility] Draw.  Procedure                               Abnormality         Status                     ---------                               -----------         ------                     Light Blue Top[[id number]]                                   Final result               Green Top (Gel)[[id number]]                                  Final result               Lavender Top[[id number]]                                     Final result               Gold Top - SST[[id number]]                                   Final result                 Please view results for these tests on the individual orders.   BASIC METABOLIC PANEL   CBC (NO DIFF)   CBC AND DIFFERENTIAL    Narrative:     The following orders were created for panel order CBC & Differential.  Procedure                               Abnormality         Status                     ---------                               -----------         ------                     CBC  Auto Differential[[id number]]        Abnormal            Final result                 Please view results for these tests on the individual orders.   LIGHT BLUE TOP   GREEN TOP   LAVENDER TOP   GOLD TOP - [facility]       EKG:   ECG 12 Lead   Preliminary Result   HEART RATE= 68  bpm   RR Interval= 888  ms   IN Interval= 135  ms   P Horizontal Axis= -7  deg   P Front Axis= 43  deg   QRSD Interval= 97  ms   QT Interval= 430  ms   QRS Axis= 74  deg   T Wave Axis= -26  deg   - BORDERLINE ECG -   Sinus rhythm   Probable left atrial enlargement   Electronically Signed By:    Date and Time of Study: 2020-03-07 03:50:29          Meds given in ED:   Medications   sodium chloride 0.9 % flush 10 mL (has no administration in time range)   sodium chloride 0.9 % infusion (125 mL/hr Intravenous New Bag 3/7/20 0523)   sodium chloride 0.9 % flush 10 mL (has no administration in time range)   sodium chloride 0.9 % flush 10 mL (has no administration in time range)   lactated ringers infusion (has no administration in time range)   acetaminophen (TYLENOL) tablet 650 mg (has no administration in time range)     Or   acetaminophen (TYLENOL) 160 MG/5ML solution 650 mg (has no administration in time range)     Or   acetaminophen (TYLENOL) suppository 650 mg (has no administration in time range)   bisacodyl (DULCOLAX) EC tablet 5 mg (has no administration in time range)   ondansetron (ZOFRAN) tablet 4 mg (has no administration in time range)     Or   ondansetron (ZOFRAN) injection 4 mg (has no administration in time range)   calcium carbonate (TUMS) chewable tablet 500 mg (200 mg elemental) (has no administration in time range)   nitroglycerin (NITROSTAT) SL tablet 0.4 mg (has no administration in time range)   HYDROmorphone (DILAUDID) injection 0.5 mg (has no administration in time range)   promethazine (PHENERGAN) injection 12.5 mg (12.5 mg Intravenous Given 3/7/20 0503)   HYDROmorphone (DILAUDID) injection 1 mg (1 mg Intravenous Given 3/7/20  0503)   sodium chloride 0.9 % bolus 1,000 mL (1,000 mL Intravenous New Bag 3/7/20 0524)   iopamidol (ISOVUE-300) 61 % injection 85 mL (85 mL Intravenous Given by Other 3/7/20 0511)       Imaging results:  Ct Abdomen Pelvis With Contrast    Result Date: 3/7/2020   1. Inflammatory stranding is seen surrounding the pancreas, suspicious for pancreatitis. While there is some fluid identified within the upper abdomen, no organized peripancreatic collections are seen. The pancreas enhances normally. 2. Area of decreased attenuation involving the inferior aspect of the spleen. Small splenic infarction is not excluded.  Radiation dose reduction techniques were utilized, including automated exposure control and exposure modulation based on body size.  This report was finalized on 3/7/2020 5:42 AM by Dr. Brianna Soliz M.D.        Ambulatory status:   - ad gaye    Social issues:   Social History     Socioeconomic History   • Marital status: Single     Spouse name: Not on file   • Number of children: Not on file   • Years of education: Not on file   • Highest education level: Not on file   Tobacco Use   • Smoking status: Never Smoker   Substance and Sexual Activity   • Alcohol use: No        Thao Aguiar, RN  03/07/20 0605

## 2020-03-07 NOTE — ED NOTES
"Pt ambulatory to triage with c/o back spasms that started at 6 pm, now going through to abdomen - \"the whole epigastric area.\" States n/v, denies Alma Sahni RN  03/07/20 0200    "

## 2020-03-07 NOTE — PLAN OF CARE
Problem: Pain, Chronic (Adult)  Goal: Identify Related Risk Factors and Signs and Symptoms  Outcome: Ongoing (interventions implemented as appropriate)  Goal: Acceptable Pain/Comfort Level and Functional Ability  Outcome: Ongoing (interventions implemented as appropriate)     Problem: Patient Care Overview  Goal: Plan of Care Review  Outcome: Ongoing (interventions implemented as appropriate)  Flowsheets (Taken 3/7/2020 8220)  Progress: improving  Plan of Care Reviewed With: patient  Outcome Summary: Patient has been pleasant and cooperative during shift. Patient hypertensive. Patient treated for pain and nausea multiple times. No SOA noted. Back spasms at times and has been treated for them. Will continue to monitor and assist patient as needed.  Goal: Individualization and Mutuality  Outcome: Ongoing (interventions implemented as appropriate)  Goal: Discharge Needs Assessment  Outcome: Ongoing (interventions implemented as appropriate)  Goal: Interprofessional Rounds/Family Conf  Outcome: Ongoing (interventions implemented as appropriate)     Problem: Pancreatitis, Acute/Chronic (Adult)  Goal: Signs and Symptoms of Listed Potential Problems Will be Absent, Minimized or Managed (Pancreatitis, Acute/Chronic)  Outcome: Ongoing (interventions implemented as appropriate)

## 2020-03-08 ENCOUNTER — APPOINTMENT (OUTPATIENT)
Dept: MRI IMAGING | Facility: HOSPITAL | Age: 55
End: 2020-03-08

## 2020-03-08 PROBLEM — E66.01 MORBID OBESITY (HCC): Status: ACTIVE | Noted: 2020-03-08

## 2020-03-08 PROBLEM — I48.91 ATRIAL FIBRILLATION (HCC): Status: ACTIVE | Noted: 2020-03-08

## 2020-03-08 PROBLEM — M54.9 BACK PAIN: Status: ACTIVE | Noted: 2020-03-08

## 2020-03-08 LAB
ALBUMIN SERPL-MCNC: 3.4 G/DL (ref 3.5–5.2)
ALBUMIN/GLOB SERPL: 1.4 G/DL
ALP SERPL-CCNC: 51 U/L (ref 39–117)
ALT SERPL W P-5'-P-CCNC: 39 U/L (ref 1–41)
ANION GAP SERPL CALCULATED.3IONS-SCNC: 13 MMOL/L (ref 5–15)
AST SERPL-CCNC: 26 U/L (ref 1–40)
BASOPHILS # BLD AUTO: 0.03 10*3/MM3 (ref 0–0.2)
BASOPHILS NFR BLD AUTO: 0.1 % (ref 0–1.5)
BILIRUB SERPL-MCNC: 0.9 MG/DL (ref 0.2–1.2)
BUN BLD-MCNC: 19 MG/DL (ref 6–20)
BUN/CREAT SERPL: 22.6 (ref 7–25)
CALCIUM SPEC-SCNC: 8.2 MG/DL (ref 8.6–10.5)
CHLORIDE SERPL-SCNC: 99 MMOL/L (ref 98–107)
CO2 SERPL-SCNC: 23 MMOL/L (ref 22–29)
CREAT BLD-MCNC: 0.84 MG/DL (ref 0.76–1.27)
DEPRECATED RDW RBC AUTO: 42.3 FL (ref 37–54)
EOSINOPHIL # BLD AUTO: 0 10*3/MM3 (ref 0–0.4)
EOSINOPHIL NFR BLD AUTO: 0 % (ref 0.3–6.2)
ERYTHROCYTE [DISTWIDTH] IN BLOOD BY AUTOMATED COUNT: 13 % (ref 12.3–15.4)
GFR SERPL CREATININE-BSD FRML MDRD: 95 ML/MIN/1.73
GLOBULIN UR ELPH-MCNC: 2.4 GM/DL
GLUCOSE BLD-MCNC: 203 MG/DL (ref 65–99)
GLUCOSE BLDC GLUCOMTR-MCNC: 158 MG/DL (ref 70–130)
GLUCOSE BLDC GLUCOMTR-MCNC: 182 MG/DL (ref 70–130)
GLUCOSE BLDC GLUCOMTR-MCNC: 190 MG/DL (ref 70–130)
GLUCOSE BLDC GLUCOMTR-MCNC: 200 MG/DL (ref 70–130)
HCT VFR BLD AUTO: 45.9 % (ref 37.5–51)
HGB BLD-MCNC: 16 G/DL (ref 13–17.7)
IMM GRANULOCYTES # BLD AUTO: 0.21 10*3/MM3 (ref 0–0.05)
IMM GRANULOCYTES NFR BLD AUTO: 0.9 % (ref 0–0.5)
LIPASE SERPL-CCNC: 561 U/L (ref 13–60)
LYMPHOCYTES # BLD AUTO: 1.39 10*3/MM3 (ref 0.7–3.1)
LYMPHOCYTES NFR BLD AUTO: 5.9 % (ref 19.6–45.3)
MCH RBC QN AUTO: 31.2 PG (ref 26.6–33)
MCHC RBC AUTO-ENTMCNC: 34.9 G/DL (ref 31.5–35.7)
MCV RBC AUTO: 89.5 FL (ref 79–97)
MONOCYTES # BLD AUTO: 1.88 10*3/MM3 (ref 0.1–0.9)
MONOCYTES NFR BLD AUTO: 7.9 % (ref 5–12)
NEUTROPHILS # BLD AUTO: 20.23 10*3/MM3 (ref 1.7–7)
NEUTROPHILS NFR BLD AUTO: 85.2 % (ref 42.7–76)
NRBC BLD AUTO-RTO: 0 /100 WBC (ref 0–0.2)
PLATELET # BLD AUTO: 164 10*3/MM3 (ref 140–450)
PMV BLD AUTO: 9.4 FL (ref 6–12)
POTASSIUM BLD-SCNC: 3.4 MMOL/L (ref 3.5–5.2)
PROT SERPL-MCNC: 5.8 G/DL (ref 6–8.5)
RBC # BLD AUTO: 5.13 10*6/MM3 (ref 4.14–5.8)
SODIUM BLD-SCNC: 135 MMOL/L (ref 136–145)
WBC NRBC COR # BLD: 23.74 10*3/MM3 (ref 3.4–10.8)

## 2020-03-08 PROCEDURE — 25010000002 AMIODARONE IN DEXTROSE 5% 150-4.21 MG/100ML-% SOLUTION: Performed by: INTERNAL MEDICINE

## 2020-03-08 PROCEDURE — 25010000002 KETOROLAC TROMETHAMINE PER 15 MG: Performed by: HOSPITALIST

## 2020-03-08 PROCEDURE — 93005 ELECTROCARDIOGRAM TRACING: CPT | Performed by: HOSPITALIST

## 2020-03-08 PROCEDURE — 87040 BLOOD CULTURE FOR BACTERIA: CPT | Performed by: NURSE PRACTITIONER

## 2020-03-08 PROCEDURE — 25010000002 PROMETHAZINE PER 50 MG: Performed by: HOSPITALIST

## 2020-03-08 PROCEDURE — 25010000002 ENOXAPARIN PER 10 MG: Performed by: HOSPITALIST

## 2020-03-08 PROCEDURE — A9577 INJ MULTIHANCE: HCPCS | Performed by: HOSPITALIST

## 2020-03-08 PROCEDURE — 74183 MRI ABD W/O CNTR FLWD CNTR: CPT

## 2020-03-08 PROCEDURE — 63710000001 INSULIN LISPRO (HUMAN) PER 5 UNITS: Performed by: HOSPITALIST

## 2020-03-08 PROCEDURE — 82962 GLUCOSE BLOOD TEST: CPT

## 2020-03-08 PROCEDURE — 99232 SBSQ HOSP IP/OBS MODERATE 35: CPT | Performed by: INTERNAL MEDICINE

## 2020-03-08 PROCEDURE — 99252 IP/OBS CONSLTJ NEW/EST SF 35: CPT | Performed by: INTERNAL MEDICINE

## 2020-03-08 PROCEDURE — 25010000002 HYDROMORPHONE PER 4 MG: Performed by: INTERNAL MEDICINE

## 2020-03-08 PROCEDURE — 25010000002 ONDANSETRON PER 1 MG: Performed by: NURSE PRACTITIONER

## 2020-03-08 PROCEDURE — 25010000002 HYDROMORPHONE 1 MG/ML SOLUTION: Performed by: HOSPITALIST

## 2020-03-08 PROCEDURE — 80053 COMPREHEN METABOLIC PANEL: CPT | Performed by: HOSPITALIST

## 2020-03-08 PROCEDURE — 0 GADOBENATE DIMEGLUMINE 529 MG/ML SOLUTION: Performed by: HOSPITALIST

## 2020-03-08 PROCEDURE — 83690 ASSAY OF LIPASE: CPT | Performed by: HOSPITALIST

## 2020-03-08 PROCEDURE — 85025 COMPLETE CBC W/AUTO DIFF WBC: CPT | Performed by: HOSPITALIST

## 2020-03-08 PROCEDURE — 25010000002 HYDRALAZINE PER 20 MG: Performed by: INTERNAL MEDICINE

## 2020-03-08 PROCEDURE — 25010000002 AMIODARONE IN DEXTROSE 5% 360-4.14 MG/200ML-% SOLUTION: Performed by: INTERNAL MEDICINE

## 2020-03-08 PROCEDURE — 93010 ELECTROCARDIOGRAM REPORT: CPT | Performed by: INTERNAL MEDICINE

## 2020-03-08 RX ORDER — KETOROLAC TROMETHAMINE 30 MG/ML
30 INJECTION, SOLUTION INTRAMUSCULAR; INTRAVENOUS EVERY 6 HOURS PRN
Status: DISCONTINUED | OUTPATIENT
Start: 2020-03-08 | End: 2020-03-10

## 2020-03-08 RX ORDER — LIDOCAINE 50 MG/G
1 PATCH TOPICAL EVERY 12 HOURS SCHEDULED
Status: DISCONTINUED | OUTPATIENT
Start: 2020-03-08 | End: 2020-03-13

## 2020-03-08 RX ORDER — OXYCODONE AND ACETAMINOPHEN 7.5; 325 MG/1; MG/1
1 TABLET ORAL EVERY 4 HOURS PRN
Status: DISPENSED | OUTPATIENT
Start: 2020-03-08 | End: 2020-03-18

## 2020-03-08 RX ORDER — HYDRALAZINE HYDROCHLORIDE 20 MG/ML
10 INJECTION INTRAMUSCULAR; INTRAVENOUS EVERY 6 HOURS PRN
Status: DISCONTINUED | OUTPATIENT
Start: 2020-03-08 | End: 2020-03-19 | Stop reason: HOSPADM

## 2020-03-08 RX ADMIN — PROMETHAZINE HYDROCHLORIDE 12.5 MG: 25 INJECTION INTRAMUSCULAR; INTRAVENOUS at 01:53

## 2020-03-08 RX ADMIN — HYDROMORPHONE HYDROCHLORIDE 1 MG: 1 INJECTION, SOLUTION INTRAMUSCULAR; INTRAVENOUS; SUBCUTANEOUS at 18:55

## 2020-03-08 RX ADMIN — AMIODARONE HYDROCHLORIDE 150 MG: 1.5 INJECTION, SOLUTION INTRAVENOUS at 10:00

## 2020-03-08 RX ADMIN — PROMETHAZINE HYDROCHLORIDE 12.5 MG: 25 INJECTION INTRAMUSCULAR; INTRAVENOUS at 14:23

## 2020-03-08 RX ADMIN — AMIODARONE HYDROCHLORIDE 1 MG/MIN: 1.8 INJECTION, SOLUTION INTRAVENOUS at 10:14

## 2020-03-08 RX ADMIN — PROMETHAZINE HYDROCHLORIDE 12.5 MG: 25 INJECTION INTRAMUSCULAR; INTRAVENOUS at 08:16

## 2020-03-08 RX ADMIN — INSULIN LISPRO 2 UNITS: 100 INJECTION, SOLUTION INTRAVENOUS; SUBCUTANEOUS at 08:16

## 2020-03-08 RX ADMIN — HYDROMORPHONE HYDROCHLORIDE 0.5 MG: 1 INJECTION, SOLUTION INTRAMUSCULAR; INTRAVENOUS; SUBCUTANEOUS at 05:07

## 2020-03-08 RX ADMIN — INSULIN LISPRO 2 UNITS: 100 INJECTION, SOLUTION INTRAVENOUS; SUBCUTANEOUS at 21:14

## 2020-03-08 RX ADMIN — SODIUM CHLORIDE 125 ML/HR: 9 INJECTION, SOLUTION INTRAVENOUS at 05:07

## 2020-03-08 RX ADMIN — INSULIN LISPRO 3 UNITS: 100 INJECTION, SOLUTION INTRAVENOUS; SUBCUTANEOUS at 12:07

## 2020-03-08 RX ADMIN — METOPROLOL SUCCINATE 100 MG: 100 TABLET, FILM COATED, EXTENDED RELEASE ORAL at 08:15

## 2020-03-08 RX ADMIN — OXYCODONE HYDROCHLORIDE AND ACETAMINOPHEN 1 TABLET: 7.5; 325 TABLET ORAL at 17:01

## 2020-03-08 RX ADMIN — LIDOCAINE 1 PATCH: 50 PATCH CUTANEOUS at 21:13

## 2020-03-08 RX ADMIN — ONDANSETRON 4 MG: 2 INJECTION INTRAMUSCULAR; INTRAVENOUS at 10:10

## 2020-03-08 RX ADMIN — ACETAMINOPHEN 650 MG: 325 TABLET, FILM COATED ORAL at 12:06

## 2020-03-08 RX ADMIN — ANTACID TABLETS 2 TABLET: 500 TABLET, CHEWABLE ORAL at 12:11

## 2020-03-08 RX ADMIN — ONDANSETRON 4 MG: 2 INJECTION INTRAMUSCULAR; INTRAVENOUS at 16:04

## 2020-03-08 RX ADMIN — ENOXAPARIN SODIUM 40 MG: 40 INJECTION SUBCUTANEOUS at 12:06

## 2020-03-08 RX ADMIN — KETOROLAC TROMETHAMINE 30 MG: 30 INJECTION, SOLUTION INTRAMUSCULAR at 19:52

## 2020-03-08 RX ADMIN — HYDROMORPHONE HYDROCHLORIDE 0.5 MG: 1 INJECTION, SOLUTION INTRAMUSCULAR; INTRAVENOUS; SUBCUTANEOUS at 10:10

## 2020-03-08 RX ADMIN — SODIUM CHLORIDE, PRESERVATIVE FREE 10 ML: 5 INJECTION INTRAVENOUS at 19:54

## 2020-03-08 RX ADMIN — INSULIN LISPRO 2 UNITS: 100 INJECTION, SOLUTION INTRAVENOUS; SUBCUTANEOUS at 17:01

## 2020-03-08 RX ADMIN — SODIUM CHLORIDE, POTASSIUM CHLORIDE, SODIUM LACTATE AND CALCIUM CHLORIDE 150 ML/HR: 600; 310; 30; 20 INJECTION, SOLUTION INTRAVENOUS at 23:18

## 2020-03-08 RX ADMIN — AMIODARONE HYDROCHLORIDE 0.5 MG/MIN: 1.8 INJECTION, SOLUTION INTRAVENOUS at 16:06

## 2020-03-08 RX ADMIN — HYDRALAZINE HYDROCHLORIDE 10 MG: 20 INJECTION INTRAMUSCULAR; INTRAVENOUS at 08:16

## 2020-03-08 RX ADMIN — SODIUM CHLORIDE, PRESERVATIVE FREE 10 ML: 5 INJECTION INTRAVENOUS at 08:17

## 2020-03-08 RX ADMIN — HYDROMORPHONE HYDROCHLORIDE 0.5 MG: 1 INJECTION, SOLUTION INTRAMUSCULAR; INTRAVENOUS; SUBCUTANEOUS at 16:04

## 2020-03-08 RX ADMIN — HYDROMORPHONE HYDROCHLORIDE 1 MG: 1 INJECTION, SOLUTION INTRAMUSCULAR; INTRAVENOUS; SUBCUTANEOUS at 23:19

## 2020-03-08 RX ADMIN — ACETAMINOPHEN 650 MG: 325 TABLET, FILM COATED ORAL at 05:41

## 2020-03-08 RX ADMIN — CYCLOBENZAPRINE 10 MG: 10 TABLET, FILM COATED ORAL at 16:04

## 2020-03-08 RX ADMIN — GADOBENATE DIMEGLUMINE 20 ML: 529 INJECTION, SOLUTION INTRAVENOUS at 09:45

## 2020-03-08 RX ADMIN — HYDROMORPHONE HYDROCHLORIDE 0.5 MG: 1 INJECTION, SOLUTION INTRAMUSCULAR; INTRAVENOUS; SUBCUTANEOUS at 01:07

## 2020-03-08 NOTE — PROGRESS NOTES
Name: Cesar Vann ADMIT: 3/7/2020   : 1965  PCP: Bing Eduardo, JUICE    MRN: [id number] LOS: 1 days   AGE/SEX: 54 y.o. male  ROOM: [facility]     Subjective   Subjective   Complains of poorly controlled pain.  Mildly nauseated.  He does not drink alcohol.  Has a prior history of cholecystectomy.  Complains of back pain.    Went into A. fib with RVR earlier.  Seen by cardiology and started on amio drip.    Review of Systems     Objective   Objective   Vital Signs  Temp:  [99.1 °F (37.3 °C)-101.7 °F (38.7 °C)] 99.7 °F (37.6 °C)  Heart Rate:  [] 105  Resp:  [16-18] 18  BP: (139-185)/() 150/78  SpO2:  [90 %-96 %] 93 %  on   ;   Device (Oxygen Therapy): room air  Body mass index is 37.45 kg/m².  Physical Exam   Constitutional: He is oriented to person, place, and time. He appears well-developed and well-nourished. No distress.   Morbidly obese   HENT:   Head: Normocephalic and atraumatic.   Mouth/Throat: No oropharyngeal exudate.   Eyes: Pupils are equal, round, and reactive to light. No scleral icterus.   Neck: Neck supple.   Cardiovascular: Normal rate, regular rhythm and intact distal pulses.   No murmur heard.  Pulmonary/Chest: Effort normal and breath sounds normal. No respiratory distress. He has no wheezes.   Abdominal: Soft. Bowel sounds are normal. He exhibits no distension. There is tenderness.   Musculoskeletal: He exhibits no edema.   Neurological: He is alert and oriented to person, place, and time.   Skin: Skin is warm and dry. No rash noted.   Psychiatric: He has a normal mood and affect.   Vitals reviewed.      Results Review:       I reviewed the patient's new clinical results.  Results from last 7 days   Lab Units 20  0600 20  0802 20  0401   WBC 10*3/mm3 23.74* 13.97* 15.09*   HEMOGLOBIN g/dL 16.0 15.3 16.5   PLATELETS 10*3/mm3 164 171 163     Results from last 7 days   Lab Units 20  0600 20  0802 20  0401   SODIUM mmol/L 135* 141 140    POTASSIUM mmol/L 3.4* 4.7 4.7   CHLORIDE mmol/L 99 105 103   CO2 mmol/L 23.0 23.7 21.2*   BUN mg/dL 19 18 18   CREATININE mg/dL 0.84 1.06 1.04   GLUCOSE mg/dL 203* 263* 346*   Estimated Creatinine Clearance: 133.7 mL/min (by C-G formula based on SCr of 0.84 mg/dL).  Results from last 7 days   Lab Units 03/08/20  0600 03/07/20  0401   ALBUMIN g/dL 3.40* 4.60   BILIRUBIN mg/dL 0.9 0.6   ALK PHOS U/L 51 62   AST (SGOT) U/L 26 58*   ALT (SGPT) U/L 39 74*     Results from last 7 days   Lab Units 03/08/20  0600 03/07/20  0802 03/07/20  0401   CALCIUM mg/dL 8.2* 8.5* 9.3   ALBUMIN g/dL 3.40*  --  4.60       Hemoglobin A1C   Date/Time Value Ref Range Status   03/07/2020 0802 8.80 (H) 4.80 - 5.60 % Final     Glucose   Date/Time Value Ref Range Status   03/08/2020 1654 190 (H) 70 - 130 mg/dL Final   03/08/2020 1158 200 (H) 70 - 130 mg/dL Final   03/08/2020 0540 182 (H) 70 - 130 mg/dL Final   03/07/2020 2057 198 (H) 70 - 130 mg/dL Final   03/07/2020 1731 193 (H) 70 - 130 mg/dL Final   03/07/2020 1359 217 (H) 70 - 130 mg/dL Final         enoxaparin 40 mg Subcutaneous Q24H   insulin lispro 0-7 Units Subcutaneous 4x Daily With Meals & Nightly   lidocaine 1 patch Transdermal Q12H   metoprolol succinate  mg Oral Daily   sodium chloride 10 mL Intravenous Q12H       amiodarone 0.5 mg/min Last Rate: 0.5 mg/min (03/08/20 1606)   lactated ringers 150 mL/hr Last Rate: 150 mL/hr (03/07/20 2136)   NPO Diet NPO Except: Sips With Meds, Ice Chips       Assessment/Plan     Active Hospital Problems    Diagnosis  POA   • **Acute pancreatitis [K85.90]  Yes   • Atrial fibrillation (CMS/HCC) [I48.91]  Unknown   • Back pain [M54.9]  Unknown   • Morbid obesity (CMS/HCC) [E66.01]  Unknown   • Essential hypertension [I10]  Unknown   • Hyperglycemia [R73.9]  Unknown   • Sleep apnea [G47.30]  Unknown   • Hypercholesteremia [E78.00]  Unknown   • Asthma [J45.909]  Unknown      Resolved Hospital Problems   No resolved problems to display.       54  y.o. male admitted with Acute pancreatitis.    · Pancreatitis-await MRCP results.  Not sure of etiology for pancreatitis.  · Will increase oral pain control and try Toradol  · Monitor WBC  · Continue fluids  · A. fib- still poorly controlled with amio drip currently.  Defer anticoagulation to cardiology.  Patient had previously taken himself off Coumadin.      Norberto Simon MD  [facility]  03/08/20  6:30 PM

## 2020-03-08 NOTE — PLAN OF CARE
Problem: Pain, Chronic (Adult)  Goal: Identify Related Risk Factors and Signs and Symptoms  Outcome: Ongoing (interventions implemented as appropriate)  Goal: Acceptable Pain/Comfort Level and Functional Ability  Outcome: Ongoing (interventions implemented as appropriate)     Problem: Patient Care Overview  Goal: Plan of Care Review  Outcome: Ongoing (interventions implemented as appropriate)  Flowsheets (Taken 3/8/2020 1454)  Progress: improving  Plan of Care Reviewed With: patient  Outcome Summary: Patient has been cooperative during shift. Treated for pain and nausea when medication is available. PO pain med ordered and lidocaine patch. Patient on amiodarone drip. Patient had MRI MRCP. Prn hydralazine given once. Temperature, heart rate, and blood pressure improving. Patient ad gaye in room. Will continue to monitor and assist patient as needed.  Goal: Individualization and Mutuality  Outcome: Ongoing (interventions implemented as appropriate)  Goal: Discharge Needs Assessment  Outcome: Ongoing (interventions implemented as appropriate)  Goal: Interprofessional Rounds/Family Conf  Outcome: Ongoing (interventions implemented as appropriate)     Problem: Pancreatitis, Acute/Chronic (Adult)  Goal: Signs and Symptoms of Listed Potential Problems Will be Absent, Minimized or Managed (Pancreatitis, Acute/Chronic)  Outcome: Ongoing (interventions implemented as appropriate)

## 2020-03-08 NOTE — PROGRESS NOTES
[facility]  Inpatient Progress Note    Reason for Follow Up: Acute pancreatitis    Subjective     Interval History:   Still with pain, patient was going for his MRI immediately after I saw him this morning    Current Facility-Administered Medications:   •  acetaminophen (TYLENOL) tablet 650 mg, 650 mg, Oral, Q4H PRN, 650 mg at 03/08/20 1206 **OR** acetaminophen (TYLENOL) 160 MG/5ML solution 650 mg, 650 mg, Oral, Q4H PRN **OR** acetaminophen (TYLENOL) suppository 650 mg, 650 mg, Rectal, Q4H PRN, Dedra Ruff APRN  •  [COMPLETED] amiodarone in dextrose 5% (NEXTERONE) loading dose 150mg/100mL, 150 mg, Intravenous, Once, Last Rate: 600 mL/hr at 03/08/20 1000, 150 mg at 03/08/20 1000 **FOLLOWED BY** amiodarone (NEXTERONE) 360 mg/200 mL (1.8 mg/mL) infusion, 1 mg/min, Intravenous, Continuous, Stopped at 03/08/20 1016 **FOLLOWED BY** amiodarone (NEXTERONE) 360 mg/200 mL (1.8 mg/mL) infusion, 0.5 mg/min, Intravenous, Continuous, Stepan Pacheco MD  •  bisacodyl (DULCOLAX) EC tablet 5 mg, 5 mg, Oral, Daily PRN, Dedra Ruff APRN  •  calcium carbonate (TUMS) chewable tablet 500 mg (200 mg elemental), 2 tablet, Oral, BID PRN, Dedra Ruff APRN, 2 tablet at 03/08/20 1211  •  cyclobenzaprine (FLEXERIL) tablet 10 mg, 10 mg, Oral, TID PRN, Kei Arnold MD, 10 mg at 03/07/20 2326  •  dextrose (D50W) 25 g/ 50mL Intravenous Solution 25 g, 25 g, Intravenous, Q15 Min PRN, Kei Arnold MD  •  dextrose (GLUTOSE) oral gel 15 g, 15 g, Oral, Q15 Min PRN, Kei Arnold MD  •  enoxaparin (LOVENOX) syringe 40 mg, 40 mg, Subcutaneous, Q24H, Aronld, Kei E, MD, 40 mg at 03/08/20 1206  •  glucagon (human recombinant) (GLUCAGEN DIAGNOSTIC) injection 1 mg, 1 mg, Subcutaneous, Q15 Min PRN, Kei Arnold MD  •  hydrALAZINE (APRESOLINE) injection 10 mg, 10 mg, Intravenous, Q6H PRN, Stepan Pacheco MD, 10 mg at 03/08/20 0816  •  HYDROmorphone (DILAUDID) injection 0.5 mg, 0.5 mg,  Intravenous, Q2H PRN, Sher Zhang MD, 0.5 mg at 03/08/20 1010  •  insulin lispro (humaLOG) injection 0-7 Units, 0-7 Units, Subcutaneous, 4x Daily With Meals & Nightly, Kei Arnold MD, 3 Units at 03/08/20 1207  •  lactated ringers infusion, 150 mL/hr, Intravenous, Continuous, Dedra Ruff APRN, Last Rate: 150 mL/hr at 03/07/20 2136, 150 mL/hr at 03/07/20 2136  •  metoprolol succinate XL (TOPROL-XL) 24 hr tablet 100 mg, 100 mg, Oral, Daily, Kei Arnold MD, 100 mg at 03/08/20 0815  •  nitroglycerin (NITROSTAT) SL tablet 0.4 mg, 0.4 mg, Sublingual, Q5 Min PRN, Dedra Ruff APRN  •  ondansetron (ZOFRAN) tablet 4 mg, 4 mg, Oral, Q6H PRN **OR** ondansetron (ZOFRAN) injection 4 mg, 4 mg, Intravenous, Q6H PRN, Dedra Ruff APRN, 4 mg at 03/08/20 1010  •  promethazine (PHENERGAN) injection 12.5 mg, 12.5 mg, Intravenous, Q6H PRN, Kei Arnold MD, 12.5 mg at 03/08/20 0816  •  sodium chloride 0.9 % flush 10 mL, 10 mL, Intravenous, PRN, Charmaine Vogel MD  •  sodium chloride 0.9 % flush 10 mL, 10 mL, Intravenous, Q12H, Dedra Ruff APRN, 10 mL at 03/08/20 0817  •  sodium chloride 0.9 % flush 10 mL, 10 mL, Intravenous, PRN, Dedra Ruff APRN  •  sodium chloride 0.9 % infusion, 125 mL/hr, Intravenous, Continuous, Charmaine Vogel MD, Last Rate: 125 mL/hr at 03/08/20 0507, 125 mL/hr at 03/08/20 0507  Review of Systems:    He has abdominal pain bloating all other systems reviewed and negative    Objective     Vital Signs  Temp:  [99.1 °F (37.3 °C)-101.7 °F (38.7 °C)] 100.9 °F (38.3 °C)  Heart Rate:  [] 118  Resp:  [16-18] 16  BP: (146-185)/() 154/88  Body mass index is 37.45 kg/m².    Intake/Output Summary (Last 24 hours) at 3/8/2020 1310  Last data filed at 3/7/2020 1700  Gross per 24 hour   Intake 0 ml   Output --   Net 0 ml     No intake/output data recorded.     Physical Exam:   General: patient awake, alert and cooperative   Eyes: Normal lids and lashes, no  scleral icterus   Neck: supple, normal ROM   Skin: warm and dry, not jaundiced   Cardiovascular: regular rhythm and rate, no murmurs auscultated   Pulm: clear to auscultation bilaterally, regular and unlabored   Abdomen: soft, nontender, nondistended; normal bowel sounds   Extremities: no rash or edema   Psychiatric: Normal mood and behavior; memory intact     Results Review:     I reviewed the patient's new clinical results.    Results from last 7 days   Lab Units 03/08/20  0600 03/07/20  0802 03/07/20  0401   WBC 10*3/mm3 23.74* 13.97* 15.09*   HEMOGLOBIN g/dL 16.0 15.3 16.5   HEMATOCRIT % 45.9 44.3 47.1   PLATELETS 10*3/mm3 164 171 163     Results from last 7 days   Lab Units 03/08/20  0600 03/07/20  0802 03/07/20  0401   SODIUM mmol/L 135* 141 140   POTASSIUM mmol/L 3.4* 4.7 4.7   CHLORIDE mmol/L 99 105 103   CO2 mmol/L 23.0 23.7 21.2*   BUN mg/dL 19 18 18   CREATININE mg/dL 0.84 1.06 1.04   CALCIUM mg/dL 8.2* 8.5* 9.3   BILIRUBIN mg/dL 0.9  --  0.6   ALK PHOS U/L 51  --  62   ALT (SGPT) U/L 39  --  74*   AST (SGOT) U/L 26  --  58*   GLUCOSE mg/dL 203* 263* 346*     Results from last 7 days   Lab Units 03/07/20  0401   INR  1.08     Lab Results   Lab Value Date/Time    LIPASE 561 (H) 03/08/2020 0600    LIPASE >3,000 (H) 03/07/2020 0401       Radiology:  CT Abdomen Pelvis With Contrast   Final Result       1. Inflammatory stranding is seen surrounding the pancreas, suspicious   for pancreatitis. While there is some fluid identified within the upper   abdomen, no organized peripancreatic collections are seen. The pancreas   enhances normally.   2. Area of decreased attenuation involving the inferior aspect of the   spleen. Small splenic infarction is not excluded.       Radiation dose reduction techniques were utilized, including automated   exposure control and exposure modulation based on body size.       This report was finalized on 3/7/2020 5:42 AM by Dr. Brianna Soliz M.D.          MRI abdomen w wo  contrast mrcp    (Results Pending)       Assessment/Plan     Patient Active Problem List   Diagnosis   • Acute pancreatitis   • Essential hypertension   • Hyperglycemia   • Sleep apnea   • Hypercholesteremia   • Asthma       Assessment:  1. Acute pancreatitis  2. Cholecystectomy for stones 8 years ago     Plan:  · Check an MRI MRCP to rule out retained stone or ampullary stenosis, will be done today  · IV fluids per primary team  · Pain control per primary team  · Antiemetics per primary team  · Gut rest  I discussed the patients findings and my recommendations with patient and nursing staff.    Arnel Still MD

## 2020-03-08 NOTE — CONSULTS
Patient Name: Cesar Vann  Age/Sex: 54 y.o. male  : 1965  MRN: [id number]    Date of Admission: 3/7/2020  Date of Encounter Visit: 20  Encounter Provider: Stepan Pacheco MD  Place of Service: [facility] CARDIOLOGY      Referring Provider: Sher Zhang MD  Patient Care Team:  Bing Eduardo APRN as PCP - General (Family Medicine)    Subjective:   Consulted for: Atrial fibrillation with rapid ventricular response, hypertension    Chief Complaint: Back and abdominal discomfort    History of Present Illness:  Cesar Vann is a 54 y.o. male with a history of paroxysmal atrial fibrillation as well as hypertension.  He last saw  3 years ago.  The patient had been on beta-blocker and warfarin  For his atrial fibrillation.  He discontinued his warfarin on his own sometime in the last 3 years.  He still has paroxysms of atrial fibrillation.    It appears that the patient has acute pancreatitis at this time.  His heart rate is elevated as is his blood pressure.  He is not particularly complaining of any cardiac symptoms.      Past Medical History:  Past Medical History:   Diagnosis Date   • Acute stress disorder    • Anxiety    • Asthma    • Atrial fibrillation (CMS/HCC)    • Chronic back pain    • Chronic knee pain    • Depression    • Dyslipidemia    • Hypercholesteremia    • Hypertension    • Noncompliance with treatment    • Obesity    • Prediabetes    • Sleep apnea        Past Surgical History:   Procedure Laterality Date   • ABLATION OF DYSRHYTHMIC FOCUS     • CHOLECYSTECTOMY         Home Medications:   Medications Prior to Admission   Medication Sig Dispense Refill Last Dose   • metoprolol succinate XL (TOPROL-XL) 100 MG 24 hr tablet Take 1 tablet by mouth Daily. 30 tablet 1 3/7/2020 at Unknown time   • ARIPiprazole (ABILIFY) 20 MG tablet Take 20 mg by mouth Daily.   Taking   • atorvastatin (LIPITOR) 40 MG tablet Take 40 mg by  mouth Daily.   Taking   • cyclobenzaprine (FLEXERIL) 10 MG tablet Take 10 mg by mouth 3 (Three) Times a Day As Needed for Muscle Spasms.   Taking   • mirtazapine (REMERON SOL-TAB) 45 MG disintegrating tablet Take 45 mg by mouth Every Night.   Taking   • sertraline (ZOLOFT) 100 MG tablet Take 100 mg by mouth Daily.   Taking   • warfarin (COUMADIN) 1 MG tablet Take 1 mg by mouth Daily.   Taking   • warfarin (COUMADIN) 3 MG tablet Take 3 mg by mouth Daily.   Taking       Allergies:  Allergies   Allergen Reactions   • Latex Itching       Past Social History:  Social History     Socioeconomic History   • Marital status: Single     Spouse name: Not on file   • Number of children: Not on file   • Years of education: Not on file   • Highest education level: Not on file   Tobacco Use   • Smoking status: Never Smoker   Substance and Sexual Activity   • Alcohol use: No        Past Family History:  Family History   Problem Relation Age of Onset   • Heart disease Father    • Heart disease Paternal Grandfather    • Stroke Other    • Cancer Other    • Stroke Mother        Review of Systems: All systems reviewed. Pertinent positives identified in HPI. All other systems are negative.     REVIEW OF SYSTEMS:   CONSTITUTIONAL: No weight loss, fever, chills, weakness or fatigue.   HEENT: Eyes: No visual loss, blurred vision, double vision or yellow sclerae. Ears, Nose, Throat: No hearing loss, sneezing, congestion, runny nose or sore throat.   SKIN: No rash or itching.     RESPIRATORY: No shortness of breath, hemoptysis, cough or sputum.   GASTROINTESTINAL: No anorexia, nausea, vomiting or diarrhea. No abdominal pain, bright red blood per rectum or melena.  GENITOURINARY: No burning on urination, hematuria or increased frequency.  NEUROLOGICAL: No headache, dizziness, syncope, paralysis, ataxia, numbness or tingling in the extremities. No change in bowel or bladder control.   MUSCULOSKELETAL: No muscle, back pain, joint pain or  stiffness.   HEMATOLOGIC: No anemia, bleeding or bruising.   LYMPHATICS: No enlarged nodes. No history of splenectomy.   PSYCHIATRIC: No history of depression, anxiety, hallucinations.   ENDOCRINOLOGIC: No reports of sweating, cold or heat intolerance. No polyuria or polydipsia.       Objective:     Objective:  Temp:  [99.1 °F (37.3 °C)-101.7 °F (38.7 °C)] 100.9 °F (38.3 °C)  Heart Rate:  [] 118  Resp:  [16-18] 18  BP: (146-185)/() 185/93    Intake/Output Summary (Last 24 hours) at 3/8/2020 0751  Last data filed at 3/7/2020 1700  Gross per 24 hour   Intake 0 ml   Output --   Net 0 ml     Body mass index is 37.45 kg/m².      03/07/20  0226   Weight: 122 kg (268 lb 8 oz)           Physical Exam:   Physical Exam   Constitutional: He is oriented to person, place, and time. He appears well-developed and well-nourished.   HENT:   Head: Normocephalic.   Eyes: Pupils are equal, round, and reactive to light.   Neck: Normal range of motion. No JVD present. Carotid bruit is not present. No thyromegaly present.   Cardiovascular: Normal rate, S1 normal, S2 normal, normal heart sounds and intact distal pulses. An irregularly irregular rhythm present. Exam reveals no gallop and no friction rub.   No murmur heard.  Pulmonary/Chest: Effort normal and breath sounds normal.   Abdominal: Soft. Bowel sounds are normal.   Musculoskeletal: He exhibits no edema.   Neurological: He is alert and oriented to person, place, and time.   Skin: Skin is warm, dry and intact. No erythema.   Psychiatric: He has a normal mood and affect.   Vitals reviewed.        Lab Review:     Results from last 7 days   Lab Units 03/08/20  0600 03/07/20  0802 03/07/20  0401   SODIUM mmol/L 135* 141 140   POTASSIUM mmol/L 3.4* 4.7 4.7   CHLORIDE mmol/L 99 105 103   CO2 mmol/L 23.0 23.7 21.2*   BUN mg/dL 19 18 18   CREATININE mg/dL 0.84 1.06 1.04   GLUCOSE mg/dL 203* 263* 346*   CALCIUM mg/dL 8.2* 8.5* 9.3       Results from last 7 days   Lab Units  03/07/20  0401   TROPONIN T ng/mL <0.010     Results from last 7 days   Lab Units 03/08/20  0600   WBC 10*3/mm3 23.74*   HEMOGLOBIN g/dL 16.0   HEMATOCRIT % 45.9   PLATELETS 10*3/mm3 164     Results from last 7 days   Lab Units 03/07/20  0401   INR  1.08             Results from last 7 days   Lab Units 03/07/20  0802   TRIGLYCERIDES mg/dL 80                   Imaging:      Imaging Results (Most Recent)     Procedure Component Value Units Date/Time    CT Abdomen Pelvis With Contrast [[id number]] Collected:  03/07/20 0533     Updated:  03/07/20 0545    Narrative:       CT OF THE ABDOMEN AND PELVIS WITH CONTRAST     HISTORY: Moderate mid abdominal and back pain     COMPARISON: None available.     TECHNIQUE: Axial CT imaging was obtained from the dome the diaphragm to  the symphysis pubis. IV contrast was administered.     FINDINGS:  Images through the lung bases demonstrate mild dependent atelectasis. No  focal hepatic lesions are seen. The patient is noted to have  inflammatory stranding around the pancreas. The appearance is suspicious  for pancreatitis. While there is some fluid identified in the mesentery,  no organized peripancreatic collections are seen. There is no pancreatic  ductal dilatation. This process is not resulting in any gastric outlet  obstruction. The pancreas enhances normally. The main portal vein is  patent, as are the splenic and superior mesenteric veins. There is an  area of decreased attenuation which is seen involving the inferior  aspect of the spleen. Exact etiology is uncertain. It may represent a  lesion such as a cyst, but it does have a somewhat peripheral and  wedge-shaped configuration which may represent splenic infarction.  Again, however both the splenic artery and splenic vein appear to be  patent. The gallbladder is surgically absent. The adrenal glands are  within normal limits. Kidneys enhance symmetrically. There is a simple  appearing left renal cyst. No additional follow-up  is necessary. The  appendix appears unremarkable. There is no hydronephrosis. No distal  ureteral or bladder stones are seen. Urinary bladder and prostate gland  are normal. There is no evidence of mechanical bowel obstruction. No  acute osseous abnormalities are seen.       Impression:          1. Inflammatory stranding is seen surrounding the pancreas, suspicious  for pancreatitis. While there is some fluid identified within the upper  abdomen, no organized peripancreatic collections are seen. The pancreas  enhances normally.  2. Area of decreased attenuation involving the inferior aspect of the  spleen. Small splenic infarction is not excluded.     Radiation dose reduction techniques were utilized, including automated  exposure control and exposure modulation based on body size.     This report was finalized on 3/7/2020 5:42 AM by Dr. Brianna Soliz M.D.             EKG: Reviewed        Baseline:     I personally viewed and interpreted the patient's EKG/Telemetry data.    Assessment:   Assessment/Plan         Acute pancreatitis    Essential hypertension    Hyperglycemia    Sleep apnea    Hypercholesteremia    Asthma        1.  Acute pancreatitis: Per GI  2.  Atrial fibrillation: Rapid ventricular response.  He is apparently intolerant to Cardizem.  He is presently receiving metoprolol succinate.  We will start him on IV amiodarone for rate control  3.  Hypertension: Continue to adjust meds.  4.  Obstructive sleep apnea: Presently being treated        Thank you for allowing me to participate in the care of Cesar Vann. Feel free to contact me directly with any further questions or concerns.    Stepan Pacheco MD  [facility]  03/08/20  7:51 AM

## 2020-03-08 NOTE — NURSING NOTE
Spoke with nurse zoltan on pt condition she said she would get in the touch with md to place orders. Still has a temp of 100.9. now sustaining afib in 130's asymtomatic

## 2020-03-08 NOTE — PLAN OF CARE
Pt had temp of 101.7 md notified tylenol given blood culturs drawn. Pt has a hx of afib and stated that it always happens when he gets sick. Md notified cardio consulted for am. No chest pain on assessment. Will continue to monitor

## 2020-03-08 NOTE — NURSING NOTE
Spoke with lha zoltan tempeture 101.2 tylenol given. Pt heart rate sustaining 130 more frequently will call cardiology

## 2020-03-08 NOTE — CONSULTS
Adult Nutrition  Assessment/PES    Patient Name:  Cesar Vann  YOB: 1965  MRN: [id number]  Admit Date:  3/7/2020    Assessment Date:  3/8/2020    Comments:  Mr Vann is a 53yo male with acute pancreatitis. He is currently NPO. Diet education material provided. Will discuss with pt before discharge.    Reason for Assessment     Row Name 03/08/20 1202          Reason for Assessment    Reason For Assessment  physician consult     Diagnosis  gastrointestinal disease;endocrine conditions;cardiac disease;pulmonary disease acute pancreatitis, DM, HTN, sleep apnea, AFIB,obesity, depression, anxiety           Anthropometrics     Row Name 03/08/20 1203          Body Mass Index (BMI)    BMI Assessment  BMI 35-39.9: obesity grade II         Labs/Tests/Procedures/Meds     Row Name 03/08/20 1203          Labs/Procedures/Meds    Lab Results Reviewed  reviewed     Lab Results Comments  na, k, glu, alb, lipase, A1C        Diagnostic Tests/Procedures    Diagnostic Test/Procedure Reviewed  reviewed        Medications    Pertinent Medications Reviewed  reviewed     Pertinent Medications Comments  lovenox, insulin, IVF         Physical Findings     Row Name 03/08/20 1204          Physical Findings    Overall Physical Appearance  obese;on oxygen therapy     Gastrointestinal  nausea;other (see comments) abd pain     Skin  -- thony 20           Nutrition Prescription Ordered     Row Name 03/08/20 1207          Nutrition Prescription PO    Current PO Diet  NPO                 Problem/Interventions:  Problem 1     Row Name 03/08/20 1207          Nutrition Diagnoses Problem 1    Problem 1  Altered GI Function     Etiology (related to)  Medical Diagnosis     Gastrointestinal  Pancreatitis, acute               Intervention Goal     Row Name 03/08/20 1208          Intervention Goal    General  Maintain nutrition;Disease management/therapy     PO  Initiate feeding;Tolerate PO     Weight  Appropriate weight loss          Nutrition Intervention     Row Name 03/08/20 1208          Nutrition Intervention    RD/Tech Action  Care plan reviewd;Follow Tx progress           Education/Evaluation     Row Name 03/08/20 1208          Education    Education  Will Instruct as appropriate material given to patient           Electronically signed by:  Michelle Magana RD  03/08/20 12:09 PM

## 2020-03-09 ENCOUNTER — APPOINTMENT (OUTPATIENT)
Dept: GENERAL RADIOLOGY | Facility: HOSPITAL | Age: 55
End: 2020-03-09

## 2020-03-09 LAB
ANION GAP SERPL CALCULATED.3IONS-SCNC: 16 MMOL/L (ref 5–15)
BASOPHILS # BLD AUTO: 0.08 10*3/MM3 (ref 0–0.2)
BASOPHILS NFR BLD AUTO: 0.3 % (ref 0–1.5)
BUN BLD-MCNC: 24 MG/DL (ref 6–20)
BUN/CREAT SERPL: 23.8 (ref 7–25)
CALCIUM SPEC-SCNC: 8.4 MG/DL (ref 8.6–10.5)
CHLORIDE SERPL-SCNC: 101 MMOL/L (ref 98–107)
CO2 SERPL-SCNC: 23 MMOL/L (ref 22–29)
CREAT BLD-MCNC: 1.01 MG/DL (ref 0.76–1.27)
DEPRECATED RDW RBC AUTO: 42.4 FL (ref 37–54)
EOSINOPHIL # BLD AUTO: 0.01 10*3/MM3 (ref 0–0.4)
EOSINOPHIL NFR BLD AUTO: 0 % (ref 0.3–6.2)
ERYTHROCYTE [DISTWIDTH] IN BLOOD BY AUTOMATED COUNT: 13 % (ref 12.3–15.4)
GFR SERPL CREATININE-BSD FRML MDRD: 77 ML/MIN/1.73
GLUCOSE BLD-MCNC: 147 MG/DL (ref 65–99)
GLUCOSE BLDC GLUCOMTR-MCNC: 144 MG/DL (ref 70–130)
GLUCOSE BLDC GLUCOMTR-MCNC: 151 MG/DL (ref 70–130)
GLUCOSE BLDC GLUCOMTR-MCNC: 155 MG/DL (ref 70–130)
GLUCOSE BLDC GLUCOMTR-MCNC: 206 MG/DL (ref 70–130)
HCT VFR BLD AUTO: 48.2 % (ref 37.5–51)
HGB BLD-MCNC: 16.7 G/DL (ref 13–17.7)
LIPASE SERPL-CCNC: 170 U/L (ref 13–60)
LYMPHOCYTES # BLD AUTO: 2.34 10*3/MM3 (ref 0.7–3.1)
LYMPHOCYTES NFR BLD AUTO: 9.8 % (ref 19.6–45.3)
MCH RBC QN AUTO: 31.2 PG (ref 26.6–33)
MCHC RBC AUTO-ENTMCNC: 34.6 G/DL (ref 31.5–35.7)
MCV RBC AUTO: 89.9 FL (ref 79–97)
MONOCYTES # BLD AUTO: 1.82 10*3/MM3 (ref 0.1–0.9)
MONOCYTES NFR BLD AUTO: 7.6 % (ref 5–12)
NEUTROPHILS # BLD AUTO: 19.28 10*3/MM3 (ref 1.7–7)
NEUTROPHILS NFR BLD AUTO: 80.5 % (ref 42.7–76)
PLATELET # BLD AUTO: 180 10*3/MM3 (ref 140–450)
PMV BLD AUTO: 9.6 FL (ref 6–12)
POTASSIUM BLD-SCNC: 3.4 MMOL/L (ref 3.5–5.2)
POTASSIUM BLD-SCNC: 3.9 MMOL/L (ref 3.5–5.2)
RBC # BLD AUTO: 5.36 10*6/MM3 (ref 4.14–5.8)
SODIUM BLD-SCNC: 140 MMOL/L (ref 136–145)
WBC NRBC COR # BLD: 23.96 10*3/MM3 (ref 3.4–10.8)

## 2020-03-09 PROCEDURE — 25010000002 HYDROMORPHONE 1 MG/ML SOLUTION: Performed by: HOSPITALIST

## 2020-03-09 PROCEDURE — 71046 X-RAY EXAM CHEST 2 VIEWS: CPT

## 2020-03-09 PROCEDURE — 82962 GLUCOSE BLOOD TEST: CPT

## 2020-03-09 PROCEDURE — 25010000002 AMIODARONE IN DEXTROSE 5% 360-4.14 MG/200ML-% SOLUTION: Performed by: INTERNAL MEDICINE

## 2020-03-09 PROCEDURE — 25010000002 KETOROLAC TROMETHAMINE PER 15 MG: Performed by: HOSPITALIST

## 2020-03-09 PROCEDURE — 63710000001 INSULIN LISPRO (HUMAN) PER 5 UNITS: Performed by: HOSPITALIST

## 2020-03-09 PROCEDURE — 25010000002 ENOXAPARIN PER 10 MG: Performed by: HOSPITALIST

## 2020-03-09 PROCEDURE — 99232 SBSQ HOSP IP/OBS MODERATE 35: CPT | Performed by: INTERNAL MEDICINE

## 2020-03-09 PROCEDURE — 99232 SBSQ HOSP IP/OBS MODERATE 35: CPT | Performed by: NURSE PRACTITIONER

## 2020-03-09 PROCEDURE — 83690 ASSAY OF LIPASE: CPT | Performed by: HOSPITALIST

## 2020-03-09 PROCEDURE — 85025 COMPLETE CBC W/AUTO DIFF WBC: CPT | Performed by: HOSPITALIST

## 2020-03-09 PROCEDURE — 84132 ASSAY OF SERUM POTASSIUM: CPT | Performed by: HOSPITALIST

## 2020-03-09 PROCEDURE — 25010000002 AMIODARONE IN DEXTROSE 5% 360-4.14 MG/200ML-% SOLUTION: Performed by: NURSE PRACTITIONER

## 2020-03-09 PROCEDURE — 80048 BASIC METABOLIC PNL TOTAL CA: CPT | Performed by: HOSPITALIST

## 2020-03-09 RX ORDER — POLYETHYLENE GLYCOL 3350 17 G/17G
17 POWDER, FOR SOLUTION ORAL DAILY PRN
Status: DISCONTINUED | OUTPATIENT
Start: 2020-03-09 | End: 2020-03-19 | Stop reason: HOSPADM

## 2020-03-09 RX ORDER — POTASSIUM CHLORIDE 1.5 G/1.77G
40 POWDER, FOR SOLUTION ORAL AS NEEDED
Status: DISCONTINUED | OUTPATIENT
Start: 2020-03-09 | End: 2020-03-19 | Stop reason: HOSPADM

## 2020-03-09 RX ORDER — POTASSIUM CHLORIDE 7.45 MG/ML
10 INJECTION INTRAVENOUS
Status: DISCONTINUED | OUTPATIENT
Start: 2020-03-09 | End: 2020-03-19 | Stop reason: HOSPADM

## 2020-03-09 RX ORDER — SENNA AND DOCUSATE SODIUM 50; 8.6 MG/1; MG/1
2 TABLET, FILM COATED ORAL NIGHTLY
Status: DISCONTINUED | OUTPATIENT
Start: 2020-03-09 | End: 2020-03-12

## 2020-03-09 RX ORDER — POTASSIUM CHLORIDE 750 MG/1
40 CAPSULE, EXTENDED RELEASE ORAL AS NEEDED
Status: DISCONTINUED | OUTPATIENT
Start: 2020-03-09 | End: 2020-03-19 | Stop reason: HOSPADM

## 2020-03-09 RX ADMIN — SODIUM CHLORIDE, POTASSIUM CHLORIDE, SODIUM LACTATE AND CALCIUM CHLORIDE 150 ML/HR: 600; 310; 30; 20 INJECTION, SOLUTION INTRAVENOUS at 21:29

## 2020-03-09 RX ADMIN — POTASSIUM CHLORIDE 40 MEQ: 10 CAPSULE, COATED, EXTENDED RELEASE ORAL at 13:40

## 2020-03-09 RX ADMIN — LIDOCAINE 1 PATCH: 50 PATCH CUTANEOUS at 09:38

## 2020-03-09 RX ADMIN — INSULIN LISPRO 2 UNITS: 100 INJECTION, SOLUTION INTRAVENOUS; SUBCUTANEOUS at 21:29

## 2020-03-09 RX ADMIN — HYDROMORPHONE HYDROCHLORIDE 1 MG: 1 INJECTION, SOLUTION INTRAMUSCULAR; INTRAVENOUS; SUBCUTANEOUS at 05:13

## 2020-03-09 RX ADMIN — ENOXAPARIN SODIUM 40 MG: 40 INJECTION SUBCUTANEOUS at 13:40

## 2020-03-09 RX ADMIN — INSULIN LISPRO 2 UNITS: 100 INJECTION, SOLUTION INTRAVENOUS; SUBCUTANEOUS at 17:36

## 2020-03-09 RX ADMIN — DOCUSATE SODIUM 50MG AND SENNOSIDES 8.6MG 2 TABLET: 8.6; 5 TABLET, FILM COATED ORAL at 21:29

## 2020-03-09 RX ADMIN — SODIUM CHLORIDE, POTASSIUM CHLORIDE, SODIUM LACTATE AND CALCIUM CHLORIDE 150 ML/HR: 600; 310; 30; 20 INJECTION, SOLUTION INTRAVENOUS at 14:38

## 2020-03-09 RX ADMIN — HYDROMORPHONE HYDROCHLORIDE 1 MG: 1 INJECTION, SOLUTION INTRAMUSCULAR; INTRAVENOUS; SUBCUTANEOUS at 11:19

## 2020-03-09 RX ADMIN — SODIUM CHLORIDE, PRESERVATIVE FREE 10 ML: 5 INJECTION INTRAVENOUS at 08:25

## 2020-03-09 RX ADMIN — HYDROMORPHONE HYDROCHLORIDE 1 MG: 1 INJECTION, SOLUTION INTRAMUSCULAR; INTRAVENOUS; SUBCUTANEOUS at 17:55

## 2020-03-09 RX ADMIN — OXYCODONE HYDROCHLORIDE AND ACETAMINOPHEN 1 TABLET: 7.5; 325 TABLET ORAL at 09:37

## 2020-03-09 RX ADMIN — HYDROMORPHONE HYDROCHLORIDE 1 MG: 1 INJECTION, SOLUTION INTRAMUSCULAR; INTRAVENOUS; SUBCUTANEOUS at 08:24

## 2020-03-09 RX ADMIN — CYCLOBENZAPRINE 10 MG: 10 TABLET, FILM COATED ORAL at 04:21

## 2020-03-09 RX ADMIN — BISACODYL 5 MG: 5 TABLET ORAL at 23:58

## 2020-03-09 RX ADMIN — AMIODARONE HYDROCHLORIDE 0.5 MG/MIN: 1.8 INJECTION, SOLUTION INTRAVENOUS at 04:21

## 2020-03-09 RX ADMIN — HYDROMORPHONE HYDROCHLORIDE 1 MG: 1 INJECTION, SOLUTION INTRAMUSCULAR; INTRAVENOUS; SUBCUTANEOUS at 15:42

## 2020-03-09 RX ADMIN — CYCLOBENZAPRINE 10 MG: 10 TABLET, FILM COATED ORAL at 09:36

## 2020-03-09 RX ADMIN — POTASSIUM CHLORIDE 40 MEQ: 10 CAPSULE, COATED, EXTENDED RELEASE ORAL at 19:37

## 2020-03-09 RX ADMIN — KETOROLAC TROMETHAMINE 30 MG: 30 INJECTION, SOLUTION INTRAMUSCULAR at 04:13

## 2020-03-09 RX ADMIN — AMIODARONE HYDROCHLORIDE 0.5 MG/MIN: 1.8 INJECTION, SOLUTION INTRAVENOUS at 17:36

## 2020-03-09 RX ADMIN — GLYCERIN 7.5 ML: 5.4 LIQUID RECTAL at 17:36

## 2020-03-09 RX ADMIN — SODIUM CHLORIDE, POTASSIUM CHLORIDE, SODIUM LACTATE AND CALCIUM CHLORIDE 150 ML/HR: 600; 310; 30; 20 INJECTION, SOLUTION INTRAVENOUS at 07:39

## 2020-03-09 RX ADMIN — OXYCODONE HYDROCHLORIDE AND ACETAMINOPHEN 1 TABLET: 7.5; 325 TABLET ORAL at 23:58

## 2020-03-09 RX ADMIN — HYDROMORPHONE HYDROCHLORIDE 1 MG: 1 INJECTION, SOLUTION INTRAMUSCULAR; INTRAVENOUS; SUBCUTANEOUS at 13:45

## 2020-03-09 RX ADMIN — METOPROLOL SUCCINATE 100 MG: 100 TABLET, FILM COATED, EXTENDED RELEASE ORAL at 09:36

## 2020-03-09 RX ADMIN — POLYETHYLENE GLYCOL 3350 17 G: 17 POWDER, FOR SOLUTION ORAL at 17:36

## 2020-03-09 RX ADMIN — KETOROLAC TROMETHAMINE 30 MG: 30 INJECTION, SOLUTION INTRAMUSCULAR at 18:38

## 2020-03-09 RX ADMIN — INSULIN LISPRO 3 UNITS: 100 INJECTION, SOLUTION INTRAVENOUS; SUBCUTANEOUS at 13:40

## 2020-03-09 NOTE — PLAN OF CARE
Requiring freq pain medicine for back spasms. Lipase level trending down. WBC elevated. IVF's. Started on and tolerated clear liq diet. K+ replacement. Encouraged increasing activity in chair and walking. Meds and enema given for constipation. Amiodarone drip. Telemetry SR.  Problem: Pain, Chronic (Adult)  Goal: Identify Related Risk Factors and Signs and Symptoms  Outcome: Ongoing (interventions implemented as appropriate)  Goal: Acceptable Pain/Comfort Level and Functional Ability  Outcome: Ongoing (interventions implemented as appropriate)     Problem: Pain, Chronic (Adult)  Goal: Identify Related Risk Factors and Signs and Symptoms  Outcome: Ongoing (interventions implemented as appropriate)  Goal: Acceptable Pain/Comfort Level and Functional Ability  Outcome: Ongoing (interventions implemented as appropriate)     Problem: Patient Care Overview  Goal: Plan of Care Review  Outcome: Ongoing (interventions implemented as appropriate)  Goal: Individualization and Mutuality  Outcome: Ongoing (interventions implemented as appropriate)  Goal: Discharge Needs Assessment  Outcome: Ongoing (interventions implemented as appropriate)  Goal: Interprofessional Rounds/Family Conf  Outcome: Ongoing (interventions implemented as appropriate)     Problem: Pancreatitis, Acute/Chronic (Adult)  Goal: Signs and Symptoms of Listed Potential Problems Will be Absent, Minimized or Managed (Pancreatitis, Acute/Chronic)  Outcome: Ongoing (interventions implemented as appropriate)

## 2020-03-09 NOTE — PLAN OF CARE
Pt states Toradol is helping with pain, no c/o nausea, VS'S, converted to NSR, Eyes closed at long interval, will continue to monitor

## 2020-03-09 NOTE — PROGRESS NOTES
Name: Cesar Vann ADMIT: 3/7/2020   : 1965  PCP: Bing Eduardo APRN    MRN: [id number] LOS: 2 days   AGE/SEX: 54 y.o. male  ROOM: [facility]     Subjective   Subjective   Nausea has resolved today. He still complains of significant pain in the mid and left side of his abdomen.  The pain is level 6 of 10.  He did rest last night and is happy that he is no longer nauseated.  He does not have a desire to advance his diet at this time due to fear of the nausea returning.  He is tolerating po meds without any increase in pain or nausea.  No additional complaints.         Objective   Objective   Vital Signs  Temp:  [99.4 °F (37.4 °C)-99.8 °F (37.7 °C)] 99.4 °F (37.4 °C)  Heart Rate:  [] 76  Resp:  [18-20] 18  BP: (117-160)/() 134/66  SpO2:  [92 %-97 %] 93 %  on   ;   Device (Oxygen Therapy): CPAP  Body mass index is 37.45 kg/m².  Physical Exam   Constitutional: He is oriented to person, place, and time. He appears well-developed and well-nourished.   HENT:   Head: Normocephalic and atraumatic.   Neck: Normal range of motion.   Cardiovascular: Normal rate, regular rhythm and normal heart sounds. Exam reveals no gallop and no friction rub.   No murmur heard.  Pulmonary/Chest: Effort normal and breath sounds normal. No respiratory distress.   Abdominal: Soft. There is no tenderness.   Hypoactive bowel sounds x4   Musculoskeletal: Normal range of motion. He exhibits no edema.   Neurological: He is alert and oriented to person, place, and time.   Skin: Skin is warm and dry. Capillary refill takes less than 2 seconds.   Psychiatric: His behavior is normal.   Flat affect       Results Review:       I reviewed the patient's new clinical results.  Results from last 7 days   Lab Units 20  0427 20  0600 20  0802 20  0401   WBC 10*3/mm3 23.96* 23.74* 13.97* 15.09*   HEMOGLOBIN g/dL 16.7 16.0 15.3 16.5   PLATELETS 10*3/mm3 180 164 171 163     Results from last 7 days   Lab  Units 03/09/20  0427 03/08/20  0600 03/07/20  0802 03/07/20  0401   SODIUM mmol/L 140 135* 141 140   POTASSIUM mmol/L 3.4* 3.4* 4.7 4.7   CHLORIDE mmol/L 101 99 105 103   CO2 mmol/L 23.0 23.0 23.7 21.2*   BUN mg/dL 24* 19 18 18   CREATININE mg/dL 1.01 0.84 1.06 1.04   GLUCOSE mg/dL 147* 203* 263* 346*   Estimated Creatinine Clearance: 111.2 mL/min (by C-G formula based on SCr of 1.01 mg/dL).  Results from last 7 days   Lab Units 03/08/20  0600 03/07/20  0401   ALBUMIN g/dL 3.40* 4.60   BILIRUBIN mg/dL 0.9 0.6   ALK PHOS U/L 51 62   AST (SGOT) U/L 26 58*   ALT (SGPT) U/L 39 74*     Results from last 7 days   Lab Units 03/09/20 0427 03/08/20  0600 03/07/20  0802 03/07/20  0401   CALCIUM mg/dL 8.4* 8.2* 8.5* 9.3   ALBUMIN g/dL  --  3.40*  --  4.60       Hemoglobin A1C   Date/Time Value Ref Range Status   03/07/2020 0802 8.80 (H) 4.80 - 5.60 % Final     Glucose   Date/Time Value Ref Range Status   03/09/2020 0632 144 (H) 70 - 130 mg/dL Final   03/08/2020 2102 158 (H) 70 - 130 mg/dL Final   03/08/2020 1654 190 (H) 70 - 130 mg/dL Final   03/08/2020 1158 200 (H) 70 - 130 mg/dL Final   03/08/2020 0540 182 (H) 70 - 130 mg/dL Final   03/07/2020 2057 198 (H) 70 - 130 mg/dL Final   03/07/2020 1731 193 (H) 70 - 130 mg/dL Final         enoxaparin 40 mg Subcutaneous Q24H   insulin lispro 0-7 Units Subcutaneous 4x Daily With Meals & Nightly   lidocaine 1 patch Transdermal Q12H   metoprolol succinate  mg Oral Daily   sodium chloride 10 mL Intravenous Q12H       lactated ringers 150 mL/hr Last Rate: 150 mL/hr (03/09/20 6076)   NPO Diet NPO Except: Sips With Meds, Ice Chips    Lipase: 170  MRI:   IMPRESSION:  Markedly limited exam as the patient could not cooperate  with breath-hold procedure. Both pre and postcontrast images were  markedly limited.  MR findings of acute pancreatitis. Status post cholecystectomy. No  evidence of choledocholithiasis. No dilatation of the pancreatic duct.  Additional details as above.             Assessment/Plan     Active Hospital Problems    Diagnosis  POA   • **Acute pancreatitis [K85.90]  Yes   • Atrial fibrillation (CMS/HCC) [I48.91]  Unknown   • Back pain [M54.9]  Unknown   • Morbid obesity (CMS/HCC) [E66.01]  Unknown   • Essential hypertension [I10]  Unknown   • Hyperglycemia [R73.9]  Unknown   • Sleep apnea [G47.30]  Unknown   • Hypercholesteremia [E78.00]  Unknown   • Asthma [J45.909]  Unknown      Resolved Hospital Problems   No resolved problems to display.         Pancreatitis  -Lipase improving. 170, down from 561  -Pain continues at moderate intensity  -Nausea resolved  -Continue IV fluids  - Continue n.p.o. diet with ice chips  - We may increase to a clear liquid diet later today      Hypokalemia  -Will replace with potassium protocol  - Monitor labs daily      Leukocytosis  -WBC trending up and febrile  -Check CXR  -Monitor      Paroxysmal atrial fibrillation  - Normal sinus rhythm on the monitor at this time, heart rate in the mid to high 90s  - Continuing amiodarone drip per cardiology's orders  - Anticoagulation per cardiology orders    Hyperglycemia  - Blood glucose controlled at this time will continue current orders  -Discussed with pt. The need for a PCP. He has been searching for one. We will give him a list.           JUICE Robledo  [facility]  03/09/20  11:00 AM

## 2020-03-09 NOTE — PROGRESS NOTES
"CC: a fib    Interval History: abdominal pain improved with addition of Toradol. IVF continues. No further nausea overnight. MRI abdomen pending. On IV amio in NSR.      Vital Signs  Temp:  [99.4 °F (37.4 °C)-99.8 °F (37.7 °C)] 99.4 °F (37.4 °C)  Heart Rate:  [] 76  Resp:  [16-20] 18  BP: (117-160)/() 134/66    Intake/Output Summary (Last 24 hours) at 3/9/2020 0745  Last data filed at 3/9/2020 0500  Gross per 24 hour   Intake 2174 ml   Output --   Net 2174 ml     Flowsheet Rows      First Filed Value   Admission Height  180.3 cm (71\") Documented at 03/07/2020 0210   Admission Weight  122 kg (268 lb 8 oz) Documented at 03/07/2020 0226          PHYSICAL EXAM:  General: No acute distress  Resp:NL Rate, unlabored, clear  CV:NL rate and rhythm, NL PMI, Nl S1 and S2, no Murmur, no gallop, no rub, No JVD. Normal pedal pulses  ABD:Nl sounds, no masses or tenderness, nondistended, no guarding or rebound  Neuro: alert,cooperative and oriented  Extr: No edema or cyanosis, moves all extremities      Results Review:    Results from last 7 days   Lab Units 03/09/20  0427   SODIUM mmol/L 140   POTASSIUM mmol/L 3.4*   CHLORIDE mmol/L 101   CO2 mmol/L 23.0   BUN mg/dL 24*   CREATININE mg/dL 1.01   GLUCOSE mg/dL 147*   CALCIUM mg/dL 8.4*     Results from last 7 days   Lab Units 03/07/20  0401   TROPONIN T ng/mL <0.010     Results from last 7 days   Lab Units 03/09/20  0427   WBC 10*3/mm3 23.96*   HEMOGLOBIN g/dL 16.7   HEMATOCRIT % 48.2   PLATELETS 10*3/mm3 180     Results from last 7 days   Lab Units 03/07/20  0401   INR  1.08             Results from last 7 days   Lab Units 03/07/20  0802   TRIGLYCERIDES mg/dL 80     I reviewed the patient's new clinical results.  I personally viewed and interpreted the patient's EKG/Telemetry data-NSR        Medication Review:   Meds reviewed      amiodarone 0.5 mg/min Last Rate: 0.5 mg/min (03/09/20 0421)   lactated ringers 150 mL/hr Last Rate: 150 mL/hr (03/09/20 0739) "       Assessment/Plan  1.  54-year-old gentleman with paroxysmal atrial fibrillation, normal left ventricular systolic function on echo August 2017 now with rapid ventricular response in setting of acute pancreatitis  - in NSR on IV amiodarone. Apparently he stopped taking warfarin  A while back  2.  Acute pancreatitis GI following gut rest IV fluids.  MRI of the abdomen pending  3.  Hypertension follow up pressure  4.  Obstructive sleep apnea has home pap here  5.  Normal perfusion stress test August 2017  6. Need for healthcare maintenance provider.states he does not have a PCP    - continue IV amiodarone for now. Await GI input    Gabby Teresa, JUICE  03/09/20  7:45 AM

## 2020-03-09 NOTE — PROGRESS NOTES
Continued Stay Note  [facility]     Patient Name: Cesar Vann  MRN: [id number]  Today's Date: 3/9/2020    Admit Date: 3/7/2020    Discharge Plan     Row Name 03/09/20 1052       Plan    Plan  Plan home.  OLIVIER Sanchez RN    Provided Post Acute Provider List?  N/A    Plan Comments  FACE SHEET VERIFIED.  Spoke to pt at bedside.  Pt's PCP is JUICE Maldonado.   Pt's next of kin is his sister  ( April Roof [phone]).   Pt lives alone in a two story house.  Pt is independent with ADL's.   Pt has a C PAP machine for home use.  Pt gets prescriptions at [facility]  ([address]   Pt denies any issues affording medications.  Pt is not current with HH.  Pt has not been in SNF.  Pt denies any discharge needs.  Pt states sister can transport at discharge.  Plan home.  OLIVIER Sanchez RN        Discharge Codes    No documentation.             Danita Sanchez RN

## 2020-03-09 NOTE — PROGRESS NOTES
Discharge Planning Assessment  [facility]     Patient Name: Cesar Vann  MRN: [id number]  Today's Date: 3/9/2020    Admit Date: 3/7/2020    Discharge Needs Assessment     Row Name 03/09/20 1051       Living Environment    Lives With  alone    Current Living Arrangements  home/apartment/condo    Primary Care Provided by  self    Provides Primary Care For  no one    Family Caregiver if Needed  sibling(s)    Family Caregiver Names  Sister  (April Roof [phone])    Quality of Family Relationships  involved;supportive    Able to Return to Prior Arrangements  yes    Living Arrangement Comments  Pt lives alone in a single story house.       Resource/Environmental Concerns    Resource/Environmental Concerns  none    Transportation Concerns  car, none       Transition Planning    Patient/Family Anticipates Transition to  home    Patient/Family Anticipated Services at Transition  none    Transportation Anticipated  car, drives self;family or friend will provide       Discharge Needs Assessment    Readmission Within the Last 30 Days  no previous admission in last 30 days    Concerns to be Addressed  denies needs/concerns at this time    Equipment Currently Used at Home  bipap/cpap    Anticipated Changes Related to Illness  none    Equipment Needed After Discharge  bipap/cpap        Discharge Plan     Row Name 03/09/20 1052       Plan    Plan  Plan home.  OLIVIER Sanchez RN    Provided Post Acute Provider List?  N/A    Plan Comments  FACE SHEET VERIFIED.  Spoke to pt at bedside.  Pt's PCP is JUICE Maldonado.   Pt's next of kin is his sister  ( April Roof [phone]).   Pt lives alone in a two story house.  Pt is independent with ADL's.   Pt has a C PAP machine for home use.  Pt gets prescriptions at [facility]  ([address] [facility] [address]   Pt denies any issues affording medications.  Pt is not current with .  Pt has not been in SNF.  Pt denies any discharge needs.  Pt states sister can transport at discharge.   Plan home.  OLIVIER Sanchez RN        Destination      Coordination has not been started for this encounter.      Durable Medical Equipment      Coordination has not been started for this encounter.      Dialysis/Infusion      Coordination has not been started for this encounter.      Home Medical Care      Coordination has not been started for this encounter.      Therapy      Coordination has not been started for this encounter.      Community Resources      Coordination has not been started for this encounter.          Demographic Summary     Row Name 03/09/20 1050       General Information    Admission Type  inpatient    Arrived From  emergency department    Referral Source  admission list    Reason for Consult  discharge planning    Preferred Language  English     Used During This Interaction  no        Functional Status     Row Name 03/09/20 1050       Functional Status    Usual Activity Tolerance  good    Current Activity Tolerance  good       Functional Status, IADL    Medications  independent    Meal Preparation  independent    Housekeeping  independent    Laundry  independent    Shopping  independent       Mental Status    General Appearance WDL  WDL       Mental Status Summary    Recent Changes in Mental Status/Cognitive Functioning  no changes        Psychosocial    No documentation.       Abuse/Neglect    No documentation.       Legal    No documentation.       Substance Abuse    No documentation.       Patient Forms    No documentation.           Danita Sanchez, RN

## 2020-03-09 NOTE — CONSULTS
"Diabetes Education  Assessment/Teaching    Patient Name:  Cesar Vann  YOB: 1965  MRN: [id number]  Admit Date:  3/7/2020      Assessment Date:  3/9/2020    Most Recent Value   General Information    Referral From:  [facility] [A1C 8.8%]   Height  180.3 cm (71\")   Height Method  Stated   Weight  122 kg (268 lb 8 oz)   How would you rate your current health?  poor   Pregnancy Assessment   Diabetes History   What type of diabetes do you have?  Type 2   Length of Diabetes Diagnosis  Newly diagnosed <6 months   Current DM knowledge  poor   Have you had diabetes education/teaching in the past?  no   Do you test your blood sugar at home?  no   Have you had low blood sugar? (<70mg/dl)  no   Have you had high blood sugar? (>140mg/dl)  no   How would you rate your diabetes control?  poor   Have You Felt Down, Depressed or Hopeless?  yes   Have You Felt Little Interest or Pleasure in Doing Things?  yes   What makes it difficult for you to take care of your diabetes or yourself?  -- [has not been taking care of my health lately]   Education Preferences   What areas of diabetes would you like to learn about?  avoiding high blood sugar, diet information, testing my blood sugar at home   Nutrition Information   Assessment Topics   Healthy Eating - Assessment  Needs education   Being Active - Assessment  Needs education   Taking Medication - Assessment  Needs education   Problem Solving - Assessment  Needs education   Reducing Risk - Assessment  Needs education   Healthy Coping - Assessment  Needs education   Monitoring - Assessment  Needs education   DM Goals            Most Recent Value   DM Education Needs   Healthy Coping  Appropriate   Discharge Plan  Home   Teaching Method  Discussion, Explanation   Patient Response  Verbalized understanding, Needs reinforcement            Other Comments:  Discussed with pt his dx of diabetes. He states his PCP has been following his Bg levels but that he has never been " diabetic.He states he has been going to [facility] but is getting ready to switch to another MD.  He states he has a history of depression and has not been well for last year,janice last few months.He states that he has not been doing anything to better his richy.We discussed diabetes diagnosis and what needs to be done in the future.    He states that he is not feeling well and could we return to talk in next few days.Will F/U as pt condition improves.                                                              Electronically signed by:  Lala Haynes RN  03/09/20 12:42 PM

## 2020-03-09 NOTE — PROGRESS NOTES
[facility]  Inpatient Progress Note    Reason for Follow Up:  pancreatitis    Subjective     Interval History:   Still with pain.  Complains of abdominal distention.  Not feeling gas move through, feels that he needs to have a bowel movement.    Current Facility-Administered Medications:   •  acetaminophen (TYLENOL) tablet 650 mg, 650 mg, Oral, Q4H PRN, 650 mg at 03/08/20 1206 **OR** acetaminophen (TYLENOL) 160 MG/5ML solution 650 mg, 650 mg, Oral, Q4H PRN **OR** acetaminophen (TYLENOL) suppository 650 mg, 650 mg, Rectal, Q4H PRN, Dedra Ruff APRN  •  amiodarone (NEXTERONE) 360 mg/200 mL (1.8 mg/mL) infusion, 0.5 mg/min, Intravenous, Continuous, Gabby Teresa APRN, Last Rate: 16.67 mL/hr at 03/09/20 1343, 0.5 mg/min at 03/09/20 1343  •  bisacodyl (DULCOLAX) EC tablet 5 mg, 5 mg, Oral, Daily PRN, Dedra Ruff APRN  •  calcium carbonate (TUMS) chewable tablet 500 mg (200 mg elemental), 2 tablet, Oral, BID PRN, Dedra Ruff APRN, 2 tablet at 03/08/20 1211  •  cyclobenzaprine (FLEXERIL) tablet 10 mg, 10 mg, Oral, TID PRN, Kei Arnold MD, 10 mg at 03/09/20 0936  •  dextrose (D50W) 25 g/ 50mL Intravenous Solution 25 g, 25 g, Intravenous, Q15 Min PRN, Kei Arnold MD  •  dextrose (GLUTOSE) oral gel 15 g, 15 g, Oral, Q15 Min PRN, Kei Arnold MD  •  enoxaparin (LOVENOX) syringe 40 mg, 40 mg, Subcutaneous, Q24H, Kei Arnold MD, 40 mg at 03/09/20 1340  •  glucagon (human recombinant) (GLUCAGEN DIAGNOSTIC) injection 1 mg, 1 mg, Subcutaneous, Q15 Min PRN, Kei Arnold MD  •  hydrALAZINE (APRESOLINE) injection 10 mg, 10 mg, Intravenous, Q6H PRN, Stepan Pacheco MD, 10 mg at 03/08/20 0816  •  HYDROmorphone (DILAUDID) injection 1 mg, 1 mg, Intravenous, Q2H PRN, Norberto Simon MD, 1 mg at 03/09/20 1345  •  insulin lispro (humaLOG) injection 0-7 Units, 0-7 Units, Subcutaneous, 4x Daily With Meals & Nightly, Kei Arnold MD, 3 Units at 03/09/20  1340  •  ketorolac (TORADOL) injection 30 mg, 30 mg, Intravenous, Q6H PRN, Norberto Simon MD, 30 mg at 03/09/20 0413  •  lactated ringers infusion, 150 mL/hr, Intravenous, Continuous, Dedra Ruff APRN, Last Rate: 150 mL/hr at 03/09/20 0739, 150 mL/hr at 03/09/20 0739  •  lidocaine (LIDODERM) 5 % 1 patch, 1 patch, Transdermal, Q12H, Norberto Simon MD, 1 patch at 03/09/20 0938  •  metoprolol succinate XL (TOPROL-XL) 24 hr tablet 100 mg, 100 mg, Oral, Daily, Kei Arnold MD, 100 mg at 03/09/20 0936  •  nitroglycerin (NITROSTAT) SL tablet 0.4 mg, 0.4 mg, Sublingual, Q5 Min PRN, Dedra Ruff APRN  •  ondansetron (ZOFRAN) tablet 4 mg, 4 mg, Oral, Q6H PRN **OR** ondansetron (ZOFRAN) injection 4 mg, 4 mg, Intravenous, Q6H PRN, Dedra Ruff APRN, 4 mg at 03/08/20 1604  •  oxyCODONE-acetaminophen (PERCOCET) 7.5-325 MG per tablet 1 tablet, 1 tablet, Oral, Q4H PRN, Norberto Simon MD, 1 tablet at 03/09/20 0937  •  potassium chloride (MICRO-K) CR capsule 40 mEq, 40 mEq, Oral, PRN, 40 mEq at 03/09/20 1340 **OR** potassium chloride (KLOR-CON) packet 40 mEq, 40 mEq, Oral, PRN **OR** potassium chloride 10 mEq in 100 mL IVPB, 10 mEq, Intravenous, Q1H PRN, Norberto Simon MD  •  promethazine (PHENERGAN) injection 12.5 mg, 12.5 mg, Intravenous, Q6H PRN, Kei Arnold MD, 12.5 mg at 03/08/20 1423  •  sodium chloride 0.9 % flush 10 mL, 10 mL, Intravenous, PRN, Charmaine Vogel MD  •  sodium chloride 0.9 % flush 10 mL, 10 mL, Intravenous, Q12H, Dedra Ruff APRN, 10 mL at 03/09/20 0825  •  sodium chloride 0.9 % flush 10 mL, 10 mL, Intravenous, PRN, Dedra Ruff APRN  Review of Systems:   All systems reviewed and negative except for: GI: Abdominal pain, constipation, abdominal distention      Objective     Vital Signs  Temp:  [99.4 °F (37.4 °C)-100 °F (37.8 °C)] 100 °F (37.8 °C)  Heart Rate:  [] 97  Resp:  [18-20] 18  BP: (117-160)/()  147/83  Body mass index is 37.45 kg/m².    Intake/Output Summary (Last 24 hours) at 3/9/2020 1419  Last data filed at 3/9/2020 0500  Gross per 24 hour   Intake 2174 ml   Output --   Net 2174 ml     No intake/output data recorded.     Physical Exam:   General: patient awake, alert and cooperative   Eyes: Normal lids and lashes, no scleral icterus   Neck: supple, normal ROM   Skin: warm and dry, not jaundiced   Cardiovascular: regular rhythm and rate, no murmurs auscultated   Pulm: clear to auscultation bilaterally, regular and unlabored   Abdomen: Firm, tender, distended, obese; diminished l bowel sounds   Rectal: deferred   Extremities: no rash or edema   Psychiatric: Normal mood and behavior; memory intact     Results Review:     I reviewed the patient's new clinical results.    Results from last 7 days   Lab Units 03/09/20 0427 03/08/20 0600 03/07/20  0802   WBC 10*3/mm3 23.96* 23.74* 13.97*   HEMOGLOBIN g/dL 16.7 16.0 15.3   HEMATOCRIT % 48.2 45.9 44.3   PLATELETS 10*3/mm3 180 164 171     Results from last 7 days   Lab Units 03/09/20 0427 03/08/20  0600 03/07/20  0802 03/07/20  0401   SODIUM mmol/L 140 135* 141 140   POTASSIUM mmol/L 3.4* 3.4* 4.7 4.7   CHLORIDE mmol/L 101 99 105 103   CO2 mmol/L 23.0 23.0 23.7 21.2*   BUN mg/dL 24* 19 18 18   CREATININE mg/dL 1.01 0.84 1.06 1.04   CALCIUM mg/dL 8.4* 8.2* 8.5* 9.3   BILIRUBIN mg/dL  --  0.9  --  0.6   ALK PHOS U/L  --  51  --  62   ALT (SGPT) U/L  --  39  --  74*   AST (SGOT) U/L  --  26  --  58*   GLUCOSE mg/dL 147* 203* 263* 346*     Results from last 7 days   Lab Units 03/07/20  0401   INR  1.08     Lab Results   Lab Value Date/Time    LIPASE 170 (H) 03/09/2020 0427    LIPASE 561 (H) 03/08/2020 0600    LIPASE >3,000 (H) 03/07/2020 0401       Radiology:  MRI abdomen w wo contrast mrcp   Final Result   Markedly limited exam as the patient could not cooperate   with breath-hold procedure. Both pre and postcontrast images were   markedly limited.   MR findings  of acute pancreatitis. Status post cholecystectomy. No   evidence of choledocholithiasis. No dilatation of the pancreatic duct.   Additional details as above.       This report was finalized on 3/9/2020 12:47 PM by Dr. Bernadine Atwood M.D.          CT Abdomen Pelvis With Contrast   Final Result       1. Inflammatory stranding is seen surrounding the pancreas, suspicious   for pancreatitis. While there is some fluid identified within the upper   abdomen, no organized peripancreatic collections are seen. The pancreas   enhances normally.   2. Area of decreased attenuation involving the inferior aspect of the   spleen. Small splenic infarction is not excluded.       Radiation dose reduction techniques were utilized, including automated   exposure control and exposure modulation based on body size.       This report was finalized on 3/7/2020 5:42 AM by Dr. Brianna Soliz M.D.          XR Chest PA & Lateral    (Results Pending)       Assessment/Plan     Patient Active Problem List   Diagnosis   • Acute pancreatitis   • Essential hypertension   • Hyperglycemia   • Sleep apnea   • Hypercholesteremia   • Asthma   • Atrial fibrillation (CMS/HCC)   • Back pain   • Morbid obesity (CMS/HCC)       Impression  1. Acute pancreatitis: no choledocholithiasis as per imaging    2. Upper abdominal pain: with above, persists unchanged    3.  Abdominal distention    4.  Constipation    All issues new to me today    Plan  Check IGG levels in a.m to rule out autoimmune pancreatitis  Continue analgesia  Advance diet as tolerated  Bowel regimen  Low threshold to repeat CT imaging if pain is not improving    I discussed the patients findings and my recommendations with patient.    Tracey Davis MD

## 2020-03-10 PROBLEM — K59.00 ACUTE CONSTIPATION: Status: ACTIVE | Noted: 2020-03-10

## 2020-03-10 LAB
ANION GAP SERPL CALCULATED.3IONS-SCNC: 9 MMOL/L (ref 5–15)
BUN BLD-MCNC: 26 MG/DL (ref 6–20)
BUN/CREAT SERPL: 29.2 (ref 7–25)
CALCIUM SPEC-SCNC: 7.8 MG/DL (ref 8.6–10.5)
CHLORIDE SERPL-SCNC: 103 MMOL/L (ref 98–107)
CO2 SERPL-SCNC: 27 MMOL/L (ref 22–29)
CREAT BLD-MCNC: 0.89 MG/DL (ref 0.76–1.27)
DEPRECATED RDW RBC AUTO: 40.9 FL (ref 37–54)
ERYTHROCYTE [DISTWIDTH] IN BLOOD BY AUTOMATED COUNT: 12.7 % (ref 12.3–15.4)
GFR SERPL CREATININE-BSD FRML MDRD: 89 ML/MIN/1.73
GLUCOSE BLD-MCNC: 139 MG/DL (ref 65–99)
GLUCOSE BLDC GLUCOMTR-MCNC: 134 MG/DL (ref 70–130)
GLUCOSE BLDC GLUCOMTR-MCNC: 153 MG/DL (ref 70–130)
GLUCOSE BLDC GLUCOMTR-MCNC: 154 MG/DL (ref 70–130)
GLUCOSE BLDC GLUCOMTR-MCNC: 158 MG/DL (ref 70–130)
HCT VFR BLD AUTO: 37.9 % (ref 37.5–51)
HGB BLD-MCNC: 13.2 G/DL (ref 13–17.7)
LIPASE SERPL-CCNC: 39 U/L (ref 13–60)
MCH RBC QN AUTO: 31.2 PG (ref 26.6–33)
MCHC RBC AUTO-ENTMCNC: 34.8 G/DL (ref 31.5–35.7)
MCV RBC AUTO: 89.6 FL (ref 79–97)
PLATELET # BLD AUTO: 154 10*3/MM3 (ref 140–450)
PMV BLD AUTO: 9.5 FL (ref 6–12)
POTASSIUM BLD-SCNC: 3.3 MMOL/L (ref 3.5–5.2)
POTASSIUM BLD-SCNC: 3.9 MMOL/L (ref 3.5–5.2)
RBC # BLD AUTO: 4.23 10*6/MM3 (ref 4.14–5.8)
SODIUM BLD-SCNC: 139 MMOL/L (ref 136–145)
WBC NRBC COR # BLD: 12.33 10*3/MM3 (ref 3.4–10.8)

## 2020-03-10 PROCEDURE — 82784 ASSAY IGA/IGD/IGG/IGM EACH: CPT | Performed by: INTERNAL MEDICINE

## 2020-03-10 PROCEDURE — 25010000002 ENOXAPARIN PER 10 MG: Performed by: HOSPITALIST

## 2020-03-10 PROCEDURE — 82962 GLUCOSE BLOOD TEST: CPT

## 2020-03-10 PROCEDURE — 99232 SBSQ HOSP IP/OBS MODERATE 35: CPT | Performed by: NURSE PRACTITIONER

## 2020-03-10 PROCEDURE — 25010000002 HYDROMORPHONE 1 MG/ML SOLUTION: Performed by: HOSPITALIST

## 2020-03-10 PROCEDURE — 85027 COMPLETE CBC AUTOMATED: CPT | Performed by: NURSE PRACTITIONER

## 2020-03-10 PROCEDURE — 63710000001 INSULIN LISPRO (HUMAN) PER 5 UNITS: Performed by: HOSPITALIST

## 2020-03-10 PROCEDURE — 80048 BASIC METABOLIC PNL TOTAL CA: CPT | Performed by: NURSE PRACTITIONER

## 2020-03-10 PROCEDURE — 99232 SBSQ HOSP IP/OBS MODERATE 35: CPT | Performed by: INTERNAL MEDICINE

## 2020-03-10 PROCEDURE — 83690 ASSAY OF LIPASE: CPT | Performed by: NURSE PRACTITIONER

## 2020-03-10 PROCEDURE — 93010 ELECTROCARDIOGRAM REPORT: CPT | Performed by: INTERNAL MEDICINE

## 2020-03-10 PROCEDURE — 84132 ASSAY OF SERUM POTASSIUM: CPT | Performed by: HOSPITALIST

## 2020-03-10 PROCEDURE — 25010000002 AMIODARONE IN DEXTROSE 5% 360-4.14 MG/200ML-% SOLUTION: Performed by: NURSE PRACTITIONER

## 2020-03-10 PROCEDURE — 25010000002 KETOROLAC TROMETHAMINE PER 15 MG: Performed by: HOSPITALIST

## 2020-03-10 PROCEDURE — 93005 ELECTROCARDIOGRAM TRACING: CPT | Performed by: NURSE PRACTITIONER

## 2020-03-10 PROCEDURE — 82787 IGG 1 2 3 OR 4 EACH: CPT | Performed by: INTERNAL MEDICINE

## 2020-03-10 RX ORDER — MAGNESIUM CARB/ALUMINUM HYDROX 105-160MG
296 TABLET,CHEWABLE ORAL ONCE
Status: COMPLETED | OUTPATIENT
Start: 2020-03-10 | End: 2020-03-10

## 2020-03-10 RX ORDER — KETOROLAC TROMETHAMINE 30 MG/ML
30 INJECTION, SOLUTION INTRAMUSCULAR; INTRAVENOUS EVERY 6 HOURS PRN
Status: DISPENSED | OUTPATIENT
Start: 2020-03-10 | End: 2020-03-13

## 2020-03-10 RX ORDER — LACTULOSE 10 G/15ML
20 SOLUTION ORAL 2 TIMES DAILY PRN
Status: DISCONTINUED | OUTPATIENT
Start: 2020-03-10 | End: 2020-03-10

## 2020-03-10 RX ORDER — MAGNESIUM CARB/ALUMINUM HYDROX 105-160MG
150 TABLET,CHEWABLE ORAL DAILY PRN
Status: DISCONTINUED | OUTPATIENT
Start: 2020-03-10 | End: 2020-03-10

## 2020-03-10 RX ADMIN — POLYETHYLENE GLYCOL 3350 17 G: 17 POWDER, FOR SOLUTION ORAL at 15:21

## 2020-03-10 RX ADMIN — INSULIN LISPRO 2 UNITS: 100 INJECTION, SOLUTION INTRAVENOUS; SUBCUTANEOUS at 12:22

## 2020-03-10 RX ADMIN — DOCUSATE SODIUM 50MG AND SENNOSIDES 8.6MG 2 TABLET: 8.6; 5 TABLET, FILM COATED ORAL at 21:00

## 2020-03-10 RX ADMIN — KETOROLAC TROMETHAMINE 30 MG: 30 INJECTION, SOLUTION INTRAMUSCULAR at 00:52

## 2020-03-10 RX ADMIN — HYDROMORPHONE HYDROCHLORIDE 1 MG: 1 INJECTION, SOLUTION INTRAMUSCULAR; INTRAVENOUS; SUBCUTANEOUS at 13:14

## 2020-03-10 RX ADMIN — HYDROMORPHONE HYDROCHLORIDE 1 MG: 1 INJECTION, SOLUTION INTRAMUSCULAR; INTRAVENOUS; SUBCUTANEOUS at 04:15

## 2020-03-10 RX ADMIN — SODIUM CHLORIDE, POTASSIUM CHLORIDE, SODIUM LACTATE AND CALCIUM CHLORIDE 150 ML/HR: 600; 310; 30; 20 INJECTION, SOLUTION INTRAVENOUS at 03:09

## 2020-03-10 RX ADMIN — POTASSIUM CHLORIDE 40 MEQ: 10 CAPSULE, COATED, EXTENDED RELEASE ORAL at 12:20

## 2020-03-10 RX ADMIN — METOPROLOL SUCCINATE 100 MG: 100 TABLET, FILM COATED, EXTENDED RELEASE ORAL at 08:24

## 2020-03-10 RX ADMIN — INSULIN LISPRO 2 UNITS: 100 INJECTION, SOLUTION INTRAVENOUS; SUBCUTANEOUS at 21:00

## 2020-03-10 RX ADMIN — HYDROMORPHONE HYDROCHLORIDE 1 MG: 1 INJECTION, SOLUTION INTRAMUSCULAR; INTRAVENOUS; SUBCUTANEOUS at 07:40

## 2020-03-10 RX ADMIN — ANTACID TABLETS 2 TABLET: 500 TABLET, CHEWABLE ORAL at 11:33

## 2020-03-10 RX ADMIN — CYCLOBENZAPRINE 10 MG: 10 TABLET, FILM COATED ORAL at 08:35

## 2020-03-10 RX ADMIN — AMIODARONE HYDROCHLORIDE 0.5 MG/MIN: 1.8 INJECTION, SOLUTION INTRAVENOUS at 05:38

## 2020-03-10 RX ADMIN — KETOROLAC TROMETHAMINE 30 MG: 30 INJECTION, SOLUTION INTRAMUSCULAR at 12:01

## 2020-03-10 RX ADMIN — ENOXAPARIN SODIUM 40 MG: 40 INJECTION SUBCUTANEOUS at 12:28

## 2020-03-10 RX ADMIN — SODIUM CHLORIDE, POTASSIUM CHLORIDE, SODIUM LACTATE AND CALCIUM CHLORIDE 75 ML/HR: 600; 310; 30; 20 INJECTION, SOLUTION INTRAVENOUS at 22:08

## 2020-03-10 RX ADMIN — Medication 296 ML: at 16:57

## 2020-03-10 RX ADMIN — SODIUM CHLORIDE, PRESERVATIVE FREE 10 ML: 5 INJECTION INTRAVENOUS at 21:00

## 2020-03-10 RX ADMIN — KETOROLAC TROMETHAMINE 30 MG: 30 INJECTION, SOLUTION INTRAMUSCULAR at 17:56

## 2020-03-10 RX ADMIN — LIDOCAINE 1 PATCH: 50 PATCH CUTANEOUS at 08:25

## 2020-03-10 RX ADMIN — HYDROMORPHONE HYDROCHLORIDE 1 MG: 1 INJECTION, SOLUTION INTRAMUSCULAR; INTRAVENOUS; SUBCUTANEOUS at 10:49

## 2020-03-10 RX ADMIN — INSULIN LISPRO 2 UNITS: 100 INJECTION, SOLUTION INTRAVENOUS; SUBCUTANEOUS at 17:51

## 2020-03-10 RX ADMIN — ACETAMINOPHEN 650 MG: 325 TABLET, FILM COATED ORAL at 00:52

## 2020-03-10 RX ADMIN — SODIUM CHLORIDE, POTASSIUM CHLORIDE, SODIUM LACTATE AND CALCIUM CHLORIDE 150 ML/HR: 600; 310; 30; 20 INJECTION, SOLUTION INTRAVENOUS at 11:01

## 2020-03-10 RX ADMIN — POTASSIUM CHLORIDE 40 MEQ: 10 CAPSULE, COATED, EXTENDED RELEASE ORAL at 08:36

## 2020-03-10 RX ADMIN — HYDROMORPHONE HYDROCHLORIDE 1 MG: 1 INJECTION, SOLUTION INTRAMUSCULAR; INTRAVENOUS; SUBCUTANEOUS at 23:13

## 2020-03-10 RX ADMIN — AMIODARONE HYDROCHLORIDE 0.5 MG/MIN: 1.8 INJECTION, SOLUTION INTRAVENOUS at 17:52

## 2020-03-10 NOTE — PLAN OF CARE
Problem: Patient Care Overview  Goal: Plan of Care Review  Outcome: Ongoing (interventions implemented as appropriate)  Flowsheets  Taken 3/10/2020 0356  Progress: no change  Outcome Summary: Patient A&O. Complaints of back spasms, medicated with PO percocet and Toradol x1 so far. Continue amiodarone gtt. Continue IV fluids. PRN tylenol given for low grade temp of 100.6. Up ad gaye. Pt express concerns on want to have a bowel movement. PRN bisacodyl given without result. Vital signs stable. No acute distress noted. Will continue to monitor.  Taken 3/10/2020 0004  Plan of Care Reviewed With: patient     Problem: Pain, Chronic (Adult)  Goal: Identify Related Risk Factors and Signs and Symptoms  Outcome: Ongoing (interventions implemented as appropriate)     Problem: Pain, Chronic (Adult)  Goal: Acceptable Pain/Comfort Level and Functional Ability  Outcome: Ongoing (interventions implemented as appropriate)     Problem: Patient Care Overview  Goal: Individualization and Mutuality  Outcome: Ongoing (interventions implemented as appropriate)     Problem: Patient Care Overview  Goal: Discharge Needs Assessment  Outcome: Ongoing (interventions implemented as appropriate)     Problem: Patient Care Overview  Goal: Interprofessional Rounds/Family Conf  Outcome: Ongoing (interventions implemented as appropriate)     Problem: Pancreatitis, Acute/Chronic (Adult)  Goal: Signs and Symptoms of Listed Potential Problems Will be Absent, Minimized or Managed (Pancreatitis, Acute/Chronic)  Outcome: Ongoing (interventions implemented as appropriate)

## 2020-03-10 NOTE — PROGRESS NOTES
"CC: a fib    Interval History: had diet advanced yesterday. C/o abdominal pain. Still no bowel movement      Vital Signs  Temp:  [99.1 °F (37.3 °C)-100.6 °F (38.1 °C)] 99.8 °F (37.7 °C)  Heart Rate:  [81-97] 87  Resp:  [18] 18  BP: (111-163)/(55-83) 163/80  No intake or output data in the 24 hours ending 03/10/20 0742  Flowsheet Rows      First Filed Value   Admission Height  180.3 cm (71\") Documented at 03/07/2020 0210   Admission Weight  122 kg (268 lb 8 oz) Documented at 03/07/2020 0226          PHYSICAL EXAM:  General: No acute distress  Resp:NL Rate, unlabored, clear  CV:NL rate and rhythm, NL PMI, Nl S1 and S2, no Murmur, no gallop, no rub, No JVD. Normal pedal pulses  ABD:Nl sounds, no masses or tenderness, nondistended, no guarding or rebound  Neuro: alert,cooperative and oriented  Extr: No edema or cyanosis, moves all extremities      Results Review:    Results from last 7 days   Lab Units 03/10/20  0558   SODIUM mmol/L 139   POTASSIUM mmol/L 3.3*   CHLORIDE mmol/L 103   CO2 mmol/L 27.0   BUN mg/dL 26*   CREATININE mg/dL 0.89   GLUCOSE mg/dL 139*   CALCIUM mg/dL 7.8*     Results from last 7 days   Lab Units 03/07/20  0401   TROPONIN T ng/mL <0.010     Results from last 7 days   Lab Units 03/10/20  0558   WBC 10*3/mm3 12.33*   HEMOGLOBIN g/dL 13.2   HEMATOCRIT % 37.9   PLATELETS 10*3/mm3 154     Results from last 7 days   Lab Units 03/07/20  0401   INR  1.08             Results from last 7 days   Lab Units 03/07/20  0802   TRIGLYCERIDES mg/dL 80     I reviewed the patient's new clinical results.  I personally viewed and interpreted the patient's EKG/Telemetry data        Medication Review:   Meds reviewed      amiodarone 0.5 mg/min Last Rate: 0.5 mg/min (03/10/20 0538)   lactated ringers 150 mL/hr Last Rate: 150 mL/hr (03/10/20 1504)       Assessment/Plan  1.  54-year-old gentleman with paroxysmal atrial fibrillation, normal left ventricular systolic function on echo August 2017 now with rapid ventricular " response in setting of acute pancreatitis  - in NSR on IV amiodarone. Apparently he stopped taking warfarin a while back but is agreeable to get back on anticoagulation prior to discharge  2.  Acute pancreatitis GI following gut rest IV fluids.  MRI unremarkable.   3.  Hypertension follow up pressure occasionally elevated  4.  Obstructive sleep apnea has home pap here  5.  Normal perfusion stress test August 2017  6. Need for healthcare maintenance provider. States he does not have a PCP    Now on clear liquid diet and to advance as tolerated. Lipase now normal. Autoimmune labs pending per GI. Still c/o Pain has not had bowel movement to have more stool softeners had enema. EKG to check QTc while on amio IV. Only on VTE Lovenox and not on full anticoagulation.     Gabby Teresa, JUICE  03/10/20  07:42

## 2020-03-10 NOTE — PROGRESS NOTES
Clinical Pharmacy Services: Medication History    Cesar Vann is a 54 y.o. male presenting to [facility] for Essential hypertension [I10]  Hyperglycemia [R73.9]  Acute pancreatitis, unspecified complication status, unspecified pancreatitis type [K85.90]    He  has a past medical history of Acute stress disorder, Anxiety, Asthma, Atrial fibrillation (CMS/HCC), Chronic back pain, Chronic knee pain, Depression, Dyslipidemia, Hypercholesteremia, Hypertension, Noncompliance with treatment, Obesity, Prediabetes, and Sleep apnea.    Allergies as of 03/07/2020 - Reviewed 03/07/2020   Allergen Reaction Noted   • Latex Itching 03/07/2020     Medication information was obtained from: Patient  Pharmacy and Phone Number: Renee [phone]    Prior to Admission Medications     Prescriptions Last Dose Informant Patient Reported? Taking?    MELATONIN PO  Self Yes Yes    Take  by mouth At Night As Needed.    metoprolol succinate XL (TOPROL-XL) 100 MG 24 hr tablet 3/7/2020  No Yes    Take 1 tablet by mouth Daily.    ARIPiprazole (ABILIFY) 20 MG tablet   Yes No    Take 20 mg by mouth Daily.    atorvastatin (LIPITOR) 40 MG tablet   Yes No    Take 40 mg by mouth Daily.    cyclobenzaprine (FLEXERIL) 10 MG tablet   Yes No    Take 10 mg by mouth 3 (Three) Times a Day As Needed for Muscle Spasms.    mirtazapine (REMERON SOL-TAB) 45 MG disintegrating tablet   Yes No    Take 45 mg by mouth Every Night.    sertraline (ZOLOFT) 100 MG tablet   Yes No    Take 100 mg by mouth Daily.    warfarin (COUMADIN) 1 MG tablet   Yes No    Take 1 mg by mouth Daily.    warfarin (COUMADIN) 3 MG tablet   Yes No    Take 3 mg by mouth Daily.        Medication notes: Patient states he only takes Torpol  mg QD, and PRN melatonin, APAP, and IBU. He states he has not taken any of the other medications above in > 1 year.     This medication list is complete to the best of my knowledge as of 3/10/2020    Please call if  questions.    Thanks, Carmen Russell, PharmD Candidate 2020  3/10/2020 14:22

## 2020-03-10 NOTE — PROGRESS NOTES
Name: Cesar Vann ADMIT: 3/7/2020   : 1965  PCP: Bing Eduardo, JUICE    MRN: [id number] LOS: 3 days   AGE/SEX: 54 y.o. male  ROOM: [facility]     Subjective   Subjective   Continues with abdominal discomfort which she thinks is more due to constipation now.  No results yesterday from glycerin suppository or from MiraLAX.  Tolerating clears okay    Review of Systems     Objective   Objective   Vital Signs  Temp:  [99.1 °F (37.3 °C)-100.6 °F (38.1 °C)] 100.1 °F (37.8 °C)  Heart Rate:  [81-89] 89  Resp:  [16-18] 16  BP: (111-166)/(55-88) 166/88  SpO2:  [93 %-97 %] 97 %  on   ;   Device (Oxygen Therapy): room air  Body mass index is 37.45 kg/m².  Physical Exam   Constitutional: He is oriented to person, place, and time. He appears well-developed and well-nourished. No distress.   Morbidly obese   HENT:   Head: Normocephalic and atraumatic.   Mouth/Throat: No oropharyngeal exudate.   Eyes: Pupils are equal, round, and reactive to light. No scleral icterus.   Neck: Neck supple.   Cardiovascular: Normal rate, regular rhythm and intact distal pulses.   No murmur heard.  Pulmonary/Chest: Effort normal and breath sounds normal. No respiratory distress. He has no wheezes.   Abdominal: Bowel sounds are normal. He exhibits distension. There is tenderness.   Musculoskeletal: He exhibits edema (2+).   Neurological: He is alert and oriented to person, place, and time.   Skin: Skin is warm and dry. No rash noted.   Psychiatric: He has a normal mood and affect.   Vitals reviewed.      Results Review:       I reviewed the patient's new clinical results.  Results from last 7 days   Lab Units 03/10/20  0558 20  0427 20  0600 20  0802   WBC 10*3/mm3 12.33* 23.96* 23.74* 13.97*   HEMOGLOBIN g/dL 13.2 16.7 16.0 15.3   PLATELETS 10*3/mm3 154 180 164 171     Results from last 7 days   Lab Units 03/10/20  0558 20  2312 20  0427 20  0600 20  0802   SODIUM mmol/L 139  --  140 135*  141   POTASSIUM mmol/L 3.3* 3.9 3.4* 3.4* 4.7   CHLORIDE mmol/L 103  --  101 99 105   CO2 mmol/L 27.0  --  23.0 23.0 23.7   BUN mg/dL 26*  --  24* 19 18   CREATININE mg/dL 0.89  --  1.01 0.84 1.06   GLUCOSE mg/dL 139*  --  147* 203* 263*   Estimated Creatinine Clearance: 126.2 mL/min (by C-G formula based on SCr of 0.89 mg/dL).  Results from last 7 days   Lab Units 03/08/20  0600 03/07/20  0401   ALBUMIN g/dL 3.40* 4.60   BILIRUBIN mg/dL 0.9 0.6   ALK PHOS U/L 51 62   AST (SGOT) U/L 26 58*   ALT (SGPT) U/L 39 74*     Results from last 7 days   Lab Units 03/10/20  0558 03/09/20  0427 03/08/20  0600 03/07/20  0802 03/07/20  0401   CALCIUM mg/dL 7.8* 8.4* 8.2* 8.5* 9.3   ALBUMIN g/dL  --   --  3.40*  --  4.60       Glucose   Date/Time Value Ref Range Status   03/10/2020 1135 154 (H) 70 - 130 mg/dL Final   03/10/2020 0624 134 (H) 70 - 130 mg/dL Final   03/09/2020 2028 151 (H) 70 - 130 mg/dL Final   03/09/2020 1636 155 (H) 70 - 130 mg/dL Final   03/09/2020 1100 206 (H) 70 - 130 mg/dL Final   03/09/2020 0632 144 (H) 70 - 130 mg/dL Final   03/08/2020 2102 158 (H) 70 - 130 mg/dL Final         enoxaparin 40 mg Subcutaneous Q24H   insulin lispro 0-7 Units Subcutaneous 4x Daily With Meals & Nightly   lidocaine 1 patch Transdermal Q12H   magnesium citrate 296 mL Oral Once   metoprolol succinate  mg Oral Daily   senna-docusate sodium 2 tablet Oral Nightly   sodium chloride 10 mL Intravenous Q12H       amiodarone 0.5 mg/min Last Rate: 0.5 mg/min (03/10/20 0538)   lactated ringers 75 mL/hr Last Rate: 75 mL/hr (03/10/20 1409)   Diet Clear Liquid       Assessment/Plan     Active Hospital Problems    Diagnosis  POA   • **Acute pancreatitis [K85.90]  Yes   • Acute constipation [K59.00]  Unknown   • Atrial fibrillation (CMS/HCC) [I48.91]  Unknown   • Back pain [M54.9]  Unknown   • Morbid obesity (CMS/HCC) [E66.01]  Unknown   • Essential hypertension [I10]  Unknown   • Hyperglycemia [R73.9]  Unknown   • Sleep apnea [G47.30]   Unknown   • Hypercholesteremia [E78.00]  Unknown   • Asthma [J45.909]  Unknown      Resolved Hospital Problems   No resolved problems to display.       54 y.o. male admitted with Acute pancreatitis.    · Pancreatitis- etiology uncertain.  Follow-up IgG levels  · WBC has improved which is reassuring although he continues to have low-grade temperatures  · Back off the fluid some since he has developed some edema  · Constipation related to above plus opiate use  · Will try enema and increased oral regimen as well  · Agree, if pain is not improved after bowel movement would consider reimaging of the abdomen  · A. fib- in sinus rhythm, continues on amiodarone drip per cardiology.  Could probably switch to p.o. anytime.  Defer to them regarding anticoagulation choice  · Morbid obesity      Norberto Simon MD  [facility]  03/10/20  16:09

## 2020-03-10 NOTE — PLAN OF CARE
C/0 abd cramping and back spasms. Medicated freq for pain. Amiodarone drip continued. Small results from ss enema. Given oral citrate of Mag. Telemetry SR.  Problem: Pain, Chronic (Adult)  Goal: Identify Related Risk Factors and Signs and Symptoms  Outcome: Ongoing (interventions implemented as appropriate)  Goal: Acceptable Pain/Comfort Level and Functional Ability  Outcome: Ongoing (interventions implemented as appropriate)     Problem: Pain, Chronic (Adult)  Goal: Identify Related Risk Factors and Signs and Symptoms  Outcome: Ongoing (interventions implemented as appropriate)  Goal: Acceptable Pain/Comfort Level and Functional Ability  Outcome: Ongoing (interventions implemented as appropriate)     Problem: Patient Care Overview  Goal: Plan of Care Review  Outcome: Ongoing (interventions implemented as appropriate)  Goal: Individualization and Mutuality  Outcome: Ongoing (interventions implemented as appropriate)  Goal: Discharge Needs Assessment  Outcome: Ongoing (interventions implemented as appropriate)  Goal: Interprofessional Rounds/Family Conf  Outcome: Ongoing (interventions implemented as appropriate)     Problem: Pancreatitis, Acute/Chronic (Adult)  Goal: Signs and Symptoms of Listed Potential Problems Will be Absent, Minimized or Managed (Pancreatitis, Acute/Chronic)  Outcome: Ongoing (interventions implemented as appropriate)     Problem: Arrhythmia/Dysrhythmia (Symptomatic) (Adult)  Goal: Signs and Symptoms of Listed Potential Problems Will be Absent, Minimized or Managed (Arrhythmia/Dysrhythmia)  Outcome: Ongoing (interventions implemented as appropriate)

## 2020-03-10 NOTE — PROGRESS NOTES
[facility]  Inpatient Progress Note    Reason for Follow Up:  pancreatitis    Subjective     Interval History:   Pain persists, all over his upper abdomen and in his back.  He has been tolerating clear liquids today without any nausea.  Still needs to have a bowel movement.  Currently trying an enema.    Current Facility-Administered Medications:   •  acetaminophen (TYLENOL) tablet 650 mg, 650 mg, Oral, Q4H PRN, 650 mg at 03/10/20 0052 **OR** acetaminophen (TYLENOL) 160 MG/5ML solution 650 mg, 650 mg, Oral, Q4H PRN **OR** acetaminophen (TYLENOL) suppository 650 mg, 650 mg, Rectal, Q4H PRN, Dedra Ruff APRN  •  amiodarone (NEXTERONE) 360 mg/200 mL (1.8 mg/mL) infusion, 0.5 mg/min, Intravenous, Continuous, Gabby Teresa APRN, Last Rate: 16.67 mL/hr at 03/10/20 0538, 0.5 mg/min at 03/10/20 0538  •  bisacodyl (DULCOLAX) EC tablet 5 mg, 5 mg, Oral, Daily PRN, Dedra Ruff APRN, 5 mg at 03/09/20 2358  •  calcium carbonate (TUMS) chewable tablet 500 mg (200 mg elemental), 2 tablet, Oral, BID PRN, Dedra Ruff APRN, 2 tablet at 03/10/20 1133  •  cyclobenzaprine (FLEXERIL) tablet 10 mg, 10 mg, Oral, TID PRN, Kei Arnold MD, 10 mg at 03/10/20 0835  •  dextrose (D50W) 25 g/ 50mL Intravenous Solution 25 g, 25 g, Intravenous, Q15 Min PRN, Kei Arnold MD  •  dextrose (GLUTOSE) oral gel 15 g, 15 g, Oral, Q15 Min PRN, Kei Arnold MD  •  enoxaparin (LOVENOX) syringe 40 mg, 40 mg, Subcutaneous, Q24H, Kei Arnold MD, 40 mg at 03/10/20 1228  •  glucagon (human recombinant) (GLUCAGEN DIAGNOSTIC) injection 1 mg, 1 mg, Subcutaneous, Q15 Min PRN, Kei Arnold MD  •  hydrALAZINE (APRESOLINE) injection 10 mg, 10 mg, Intravenous, Q6H PRN, Stepan Pacheco MD, 10 mg at 03/08/20 0816  •  HYDROmorphone (DILAUDID) injection 1 mg, 1 mg, Intravenous, Q2H PRN, Norberto Simon MD, 1 mg at 03/10/20 1314  •  insulin lispro (humaLOG) injection 0-7 Units, 0-7 Units,  Subcutaneous, 4x Daily With Meals & Nightly, Kei Arnold MD, 2 Units at 03/10/20 1222  •  ketorolac (TORADOL) injection 30 mg, 30 mg, Intravenous, Q6H PRN, Norberto Simon MD, 30 mg at 03/10/20 1201  •  lactated ringers infusion, 75 mL/hr, Intravenous, Continuous, Norberto Simon MD, Last Rate: 75 mL/hr at 03/10/20 1409, 75 mL/hr at 03/10/20 1409  •  lactulose (CHRONULAC) 10 GM/15ML solution 20 g, 20 g, Oral, BID PRN, Norberto Simon MD  •  lidocaine (LIDODERM) 5 % 1 patch, 1 patch, Transdermal, Q12H, Norberto Simon MD, 1 patch at 03/10/20 0825  •  magnesium citrate solution 150 mL, 150 mL, Oral, Daily PRN, Norberto Simon MD  •  metoprolol succinate XL (TOPROL-XL) 24 hr tablet 100 mg, 100 mg, Oral, Daily, Kei Arnold MD, 100 mg at 03/10/20 0824  •  nitroglycerin (NITROSTAT) SL tablet 0.4 mg, 0.4 mg, Sublingual, Q5 Min PRN, Dedra Ruff APRN  •  ondansetron (ZOFRAN) tablet 4 mg, 4 mg, Oral, Q6H PRN **OR** ondansetron (ZOFRAN) injection 4 mg, 4 mg, Intravenous, Q6H PRN, Dedra Ruff APRN, 4 mg at 03/08/20 1604  •  oxyCODONE-acetaminophen (PERCOCET) 7.5-325 MG per tablet 1 tablet, 1 tablet, Oral, Q4H PRN, Norberto Simon MD, 1 tablet at 03/09/20 9733  •  polyethylene glycol (MIRALAX) packet 17 g, 17 g, Oral, Daily PRN, Norberto Simon MD, 17 g at 03/09/20 7696  •  potassium chloride (MICRO-K) CR capsule 40 mEq, 40 mEq, Oral, PRN, 40 mEq at 03/10/20 1220 **OR** potassium chloride (KLOR-CON) packet 40 mEq, 40 mEq, Oral, PRN **OR** potassium chloride 10 mEq in 100 mL IVPB, 10 mEq, Intravenous, Q1H PRN, Norberto Simon MD  •  promethazine (PHENERGAN) injection 12.5 mg, 12.5 mg, Intravenous, Q6H PRN, Kei Arnold MD, 12.5 mg at 03/08/20 1423  •  senna-docusate sodium (SENOKOT-S) 8.6-50 MG tablet 2 tablet, 2 tablet, Oral, Nightly, Tracey Davis MD, 2 tablet at 03/09/20 2129  •  sodium chloride 0.9 % flush 10  mL, 10 mL, Intravenous, PRN, Charmaine Vogel MD  •  sodium chloride 0.9 % flush 10 mL, 10 mL, Intravenous, Q12H, Dedra Ruff APRN, 10 mL at 03/09/20 0825  •  sodium chloride 0.9 % flush 10 mL, 10 mL, Intravenous, PRN, Dedra Ruff APRN  Review of Systems:   All systems reviewed and negative except for: GI: Abdominal pain, constipation, abdominal distention      Objective     Vital Signs  Temp:  [99.1 °F (37.3 °C)-100.6 °F (38.1 °C)] 100.1 °F (37.8 °C)  Heart Rate:  [81-89] 89  Resp:  [16-18] 16  BP: (111-166)/(55-88) 166/88  Body mass index is 37.45 kg/m².    Intake/Output Summary (Last 24 hours) at 3/10/2020 1502  Last data filed at 3/10/2020 1438  Gross per 24 hour   Intake 4926.87 ml   Output --   Net 4926.87 ml     I/O this shift:  In: 4926.9 [P.O.:330; I.V.:4596.9]  Out: -      Physical Exam:   General: patient awake, alert and cooperative   Eyes: Normal lids and lashes, no scleral icterus   Neck: supple, normal ROM   Skin: warm and dry, not jaundiced   Cardiovascular: regular rhythm and rate, no murmurs auscultated   Pulm: clear to auscultation bilaterally, regular and unlabored   Abdomen: Firm, tender, distended, obese; diminished l bowel sounds   Rectal: deferred   Extremities: no rash or edema   Psychiatric: Normal mood and behavior; memory intact     Results Review:     I reviewed the patient's new clinical results.    Results from last 7 days   Lab Units 03/10/20  0558 03/09/20  0427 03/08/20  0600   WBC 10*3/mm3 12.33* 23.96* 23.74*   HEMOGLOBIN g/dL 13.2 16.7 16.0   HEMATOCRIT % 37.9 48.2 45.9   PLATELETS 10*3/mm3 154 180 164     Results from last 7 days   Lab Units 03/10/20  0558 03/09/20  2312 03/09/20  0427 03/08/20  0600  03/07/20  0401   SODIUM mmol/L 139  --  140 135*   < > 140   POTASSIUM mmol/L 3.3* 3.9 3.4* 3.4*   < > 4.7   CHLORIDE mmol/L 103  --  101 99   < > 103   CO2 mmol/L 27.0  --  23.0 23.0   < > 21.2*   BUN mg/dL 26*  --  24* 19   < > 18   CREATININE mg/dL 0.89  --   1.01 0.84   < > 1.04   CALCIUM mg/dL 7.8*  --  8.4* 8.2*   < > 9.3   BILIRUBIN mg/dL  --   --   --  0.9  --  0.6   ALK PHOS U/L  --   --   --  51  --  62   ALT (SGPT) U/L  --   --   --  39  --  74*   AST (SGOT) U/L  --   --   --  26  --  58*   GLUCOSE mg/dL 139*  --  147* 203*   < > 346*    < > = values in this interval not displayed.     Results from last 7 days   Lab Units 03/07/20  0401   INR  1.08     Lab Results   Lab Value Date/Time    LIPASE 39 03/10/2020 0558    LIPASE 170 (H) 03/09/2020 0427    LIPASE 561 (H) 03/08/2020 0600    LIPASE >3,000 (H) 03/07/2020 0401       Radiology:  XR Chest PA & Lateral   Final Result   Small likely atelectasis or scarring.       This report was finalized on 3/9/2020 2:16 PM by Dr. Roger Howe M.D.          MRI abdomen w wo contrast mrcp   Final Result   Markedly limited exam as the patient could not cooperate   with breath-hold procedure. Both pre and postcontrast images were   markedly limited.   MR findings of acute pancreatitis. Status post cholecystectomy. No   evidence of choledocholithiasis. No dilatation of the pancreatic duct.   Additional details as above.       This report was finalized on 3/9/2020 12:47 PM by Dr. Bernadine Atwood M.D.          CT Abdomen Pelvis With Contrast   Final Result       1. Inflammatory stranding is seen surrounding the pancreas, suspicious   for pancreatitis. While there is some fluid identified within the upper   abdomen, no organized peripancreatic collections are seen. The pancreas   enhances normally.   2. Area of decreased attenuation involving the inferior aspect of the   spleen. Small splenic infarction is not excluded.       Radiation dose reduction techniques were utilized, including automated   exposure control and exposure modulation based on body size.       This report was finalized on 3/7/2020 5:42 AM by Dr. Brianna Soliz M.D.              Assessment/Plan     Patient Active Problem List   Diagnosis   • Acute  pancreatitis   • Essential hypertension   • Hyperglycemia   • Sleep apnea   • Hypercholesteremia   • Asthma   • Atrial fibrillation (CMS/HCC)   • Back pain   • Morbid obesity (CMS/HCC)       Impression  1. Acute pancreatitis: no choledocholithiasis as per imaging; pace better, white count better    2. Upper abdominal pain: Suspected multifactorial with the acute pancreatitis but also abdominal distention and bloating secondary to constipation and possibly ileus following the pancreatitis    3.  Abdominal distention: Persists    4.  Constipation: No bowel movement in a number of days        Plan  Follow-up IgG levels  Continue to advance diet as tolerated  Continue suppositories as needed  I will stop the lactulose as this tends to increase gas and abdominal pain given that he is already quite distended  Agree with mag citrate  May need a bisacodyl enema versus smog  If he does have a bowel movement and the pain persists, then I certainly would repeat CT imaging to see if the pancreatitis has worsened    I discussed the patients findings and my recommendations with patient.    Tracey Davis MD

## 2020-03-11 ENCOUNTER — APPOINTMENT (OUTPATIENT)
Dept: CT IMAGING | Facility: HOSPITAL | Age: 55
End: 2020-03-11

## 2020-03-11 LAB
ALBUMIN SERPL-MCNC: 2.7 G/DL (ref 3.5–5.2)
ALBUMIN/GLOB SERPL: 0.8 G/DL
ALP SERPL-CCNC: 60 U/L (ref 39–117)
ALT SERPL W P-5'-P-CCNC: 26 U/L (ref 1–41)
ANION GAP SERPL CALCULATED.3IONS-SCNC: 10.4 MMOL/L (ref 5–15)
AST SERPL-CCNC: 31 U/L (ref 1–40)
BILIRUB SERPL-MCNC: 0.6 MG/DL (ref 0.2–1.2)
BUN BLD-MCNC: 19 MG/DL (ref 6–20)
BUN/CREAT SERPL: 24.4 (ref 7–25)
CALCIUM SPEC-SCNC: 7.8 MG/DL (ref 8.6–10.5)
CHLORIDE SERPL-SCNC: 99 MMOL/L (ref 98–107)
CO2 SERPL-SCNC: 27.6 MMOL/L (ref 22–29)
CREAT BLD-MCNC: 0.78 MG/DL (ref 0.76–1.27)
DEPRECATED RDW RBC AUTO: 42.5 FL (ref 37–54)
ERYTHROCYTE [DISTWIDTH] IN BLOOD BY AUTOMATED COUNT: 13.1 % (ref 12.3–15.4)
GFR SERPL CREATININE-BSD FRML MDRD: 104 ML/MIN/1.73
GLOBULIN UR ELPH-MCNC: 3.2 GM/DL
GLUCOSE BLD-MCNC: 143 MG/DL (ref 65–99)
GLUCOSE BLDC GLUCOMTR-MCNC: 124 MG/DL (ref 70–130)
GLUCOSE BLDC GLUCOMTR-MCNC: 148 MG/DL (ref 70–130)
GLUCOSE BLDC GLUCOMTR-MCNC: 152 MG/DL (ref 70–130)
GLUCOSE BLDC GLUCOMTR-MCNC: 155 MG/DL (ref 70–130)
HCT VFR BLD AUTO: 37.6 % (ref 37.5–51)
HGB BLD-MCNC: 13.6 G/DL (ref 13–17.7)
IGG SERPL-MCNC: 717 MG/DL (ref 700–1600)
IGG1 SER-MCNC: 334 MG/DL (ref 248–810)
IGG2 SER-MCNC: 258 MG/DL (ref 130–555)
IGG3 SER-MCNC: 41 MG/DL (ref 15–102)
IGG4 SER-MCNC: 18 MG/DL (ref 2–96)
LYMPHOCYTES # BLD MANUAL: 1.41 10*3/MM3 (ref 0.7–3.1)
LYMPHOCYTES NFR BLD MANUAL: 11 % (ref 19.6–45.3)
LYMPHOCYTES NFR BLD MANUAL: 7 % (ref 5–12)
MCH RBC QN AUTO: 32.2 PG (ref 26.6–33)
MCHC RBC AUTO-ENTMCNC: 36.2 G/DL (ref 31.5–35.7)
MCV RBC AUTO: 88.9 FL (ref 79–97)
MONOCYTES # BLD AUTO: 0.9 10*3/MM3 (ref 0.1–0.9)
NEUTROPHILS # BLD AUTO: 10.52 10*3/MM3 (ref 1.7–7)
NEUTROPHILS NFR BLD MANUAL: 82 % (ref 42.7–76)
PLAT MORPH BLD: NORMAL
PLATELET # BLD AUTO: 139 10*3/MM3 (ref 140–450)
PMV BLD AUTO: 10.7 FL (ref 6–12)
POTASSIUM BLD-SCNC: 3.9 MMOL/L (ref 3.5–5.2)
PROT SERPL-MCNC: 5.9 G/DL (ref 6–8.5)
RBC # BLD AUTO: 4.23 10*6/MM3 (ref 4.14–5.8)
RBC MORPH BLD: NORMAL
SODIUM BLD-SCNC: 137 MMOL/L (ref 136–145)
WBC MORPH BLD: NORMAL
WBC NRBC COR # BLD: 12.83 10*3/MM3 (ref 3.4–10.8)

## 2020-03-11 PROCEDURE — 63710000001 INSULIN LISPRO (HUMAN) PER 5 UNITS: Performed by: HOSPITALIST

## 2020-03-11 PROCEDURE — 25010000002 HYDROMORPHONE 1 MG/ML SOLUTION: Performed by: HOSPITALIST

## 2020-03-11 PROCEDURE — 0 DIATRIZOATE MEGLUMINE & SODIUM PER 1 ML: Performed by: HOSPITALIST

## 2020-03-11 PROCEDURE — 99232 SBSQ HOSP IP/OBS MODERATE 35: CPT | Performed by: INTERNAL MEDICINE

## 2020-03-11 PROCEDURE — 25010000002 ENOXAPARIN PER 10 MG: Performed by: HOSPITALIST

## 2020-03-11 PROCEDURE — 80053 COMPREHEN METABOLIC PANEL: CPT | Performed by: HOSPITALIST

## 2020-03-11 PROCEDURE — 74177 CT ABD & PELVIS W/CONTRAST: CPT

## 2020-03-11 PROCEDURE — 25010000002 KETOROLAC TROMETHAMINE PER 15 MG: Performed by: HOSPITALIST

## 2020-03-11 PROCEDURE — 25010000002 AMIODARONE IN DEXTROSE 5% 360-4.14 MG/200ML-% SOLUTION: Performed by: NURSE PRACTITIONER

## 2020-03-11 PROCEDURE — 82962 GLUCOSE BLOOD TEST: CPT

## 2020-03-11 PROCEDURE — 25010000002 IOPAMIDOL 61 % SOLUTION: Performed by: HOSPITALIST

## 2020-03-11 PROCEDURE — 85007 BL SMEAR W/DIFF WBC COUNT: CPT | Performed by: HOSPITALIST

## 2020-03-11 PROCEDURE — 85025 COMPLETE CBC W/AUTO DIFF WBC: CPT | Performed by: HOSPITALIST

## 2020-03-11 PROCEDURE — 25010000002 ONDANSETRON PER 1 MG: Performed by: NURSE PRACTITIONER

## 2020-03-11 RX ORDER — BISACODYL 10 MG
10 SUPPOSITORY, RECTAL RECTAL DAILY PRN
Status: DISCONTINUED | OUTPATIENT
Start: 2020-03-11 | End: 2020-03-14

## 2020-03-11 RX ORDER — POLYETHYLENE GLYCOL 3350 17 G/17G
34 POWDER, FOR SOLUTION ORAL 2 TIMES DAILY
Status: DISCONTINUED | OUTPATIENT
Start: 2020-03-11 | End: 2020-03-16

## 2020-03-11 RX ADMIN — KETOROLAC TROMETHAMINE 30 MG: 30 INJECTION, SOLUTION INTRAMUSCULAR at 08:09

## 2020-03-11 RX ADMIN — DOCUSATE SODIUM 50MG AND SENNOSIDES 8.6MG 2 TABLET: 8.6; 5 TABLET, FILM COATED ORAL at 20:31

## 2020-03-11 RX ADMIN — AMIODARONE HYDROCHLORIDE 0.5 MG/MIN: 1.8 INJECTION, SOLUTION INTRAVENOUS at 08:20

## 2020-03-11 RX ADMIN — IOPAMIDOL 85 ML: 612 INJECTION, SOLUTION INTRAVENOUS at 15:37

## 2020-03-11 RX ADMIN — INSULIN LISPRO 2 UNITS: 100 INJECTION, SOLUTION INTRAVENOUS; SUBCUTANEOUS at 13:59

## 2020-03-11 RX ADMIN — KETOROLAC TROMETHAMINE 30 MG: 30 INJECTION, SOLUTION INTRAMUSCULAR at 00:51

## 2020-03-11 RX ADMIN — INSULIN LISPRO 2 UNITS: 100 INJECTION, SOLUTION INTRAVENOUS; SUBCUTANEOUS at 20:31

## 2020-03-11 RX ADMIN — HYDROMORPHONE HYDROCHLORIDE 1 MG: 1 INJECTION, SOLUTION INTRAMUSCULAR; INTRAVENOUS; SUBCUTANEOUS at 19:17

## 2020-03-11 RX ADMIN — HYDROMORPHONE HYDROCHLORIDE 1 MG: 1 INJECTION, SOLUTION INTRAMUSCULAR; INTRAVENOUS; SUBCUTANEOUS at 02:49

## 2020-03-11 RX ADMIN — LIDOCAINE 1 PATCH: 50 PATCH CUTANEOUS at 08:08

## 2020-03-11 RX ADMIN — CYCLOBENZAPRINE 10 MG: 10 TABLET, FILM COATED ORAL at 08:20

## 2020-03-11 RX ADMIN — CYCLOBENZAPRINE 10 MG: 10 TABLET, FILM COATED ORAL at 11:56

## 2020-03-11 RX ADMIN — KETOROLAC TROMETHAMINE 30 MG: 30 INJECTION, SOLUTION INTRAMUSCULAR at 14:06

## 2020-03-11 RX ADMIN — KETOROLAC TROMETHAMINE 30 MG: 30 INJECTION, SOLUTION INTRAMUSCULAR at 20:38

## 2020-03-11 RX ADMIN — SODIUM CHLORIDE, PRESERVATIVE FREE 10 ML: 5 INJECTION INTRAVENOUS at 20:31

## 2020-03-11 RX ADMIN — HYDROMORPHONE HYDROCHLORIDE 1 MG: 1 INJECTION, SOLUTION INTRAMUSCULAR; INTRAVENOUS; SUBCUTANEOUS at 15:17

## 2020-03-11 RX ADMIN — ONDANSETRON 4 MG: 2 INJECTION INTRAMUSCULAR; INTRAVENOUS at 02:49

## 2020-03-11 RX ADMIN — HYDROMORPHONE HYDROCHLORIDE 1 MG: 1 INJECTION, SOLUTION INTRAMUSCULAR; INTRAVENOUS; SUBCUTANEOUS at 06:06

## 2020-03-11 RX ADMIN — METOPROLOL SUCCINATE 100 MG: 100 TABLET, FILM COATED, EXTENDED RELEASE ORAL at 08:08

## 2020-03-11 RX ADMIN — ACETAMINOPHEN 650 MG: 325 TABLET, FILM COATED ORAL at 13:59

## 2020-03-11 RX ADMIN — ENOXAPARIN SODIUM 40 MG: 40 INJECTION SUBCUTANEOUS at 16:56

## 2020-03-11 RX ADMIN — DIATRIZOATE MEGLUMINE AND DIATRIZOATE SODIUM 30 ML: 600; 100 SOLUTION ORAL; RECTAL at 12:40

## 2020-03-11 NOTE — PLAN OF CARE
Pt amb x3, had small loose BM, pain med x1, no other complication, concerns  Problem: Pain, Chronic (Adult)  Goal: Identify Related Risk Factors and Signs and Symptoms  Outcome: Ongoing (interventions implemented as appropriate)  Goal: Acceptable Pain/Comfort Level and Functional Ability  Outcome: Ongoing (interventions implemented as appropriate)     Problem: Patient Care Overview  Goal: Plan of Care Review  Outcome: Ongoing (interventions implemented as appropriate)  Goal: Individualization and Mutuality  Outcome: Ongoing (interventions implemented as appropriate)  Goal: Discharge Needs Assessment  Outcome: Ongoing (interventions implemented as appropriate)  Goal: Interprofessional Rounds/Family Conf  Outcome: Ongoing (interventions implemented as appropriate)     Problem: Pancreatitis, Acute/Chronic (Adult)  Goal: Signs and Symptoms of Listed Potential Problems Will be Absent, Minimized or Managed (Pancreatitis, Acute/Chronic)  Outcome: Ongoing (interventions implemented as appropriate)     Problem: Arrhythmia/Dysrhythmia (Symptomatic) (Adult)  Goal: Signs and Symptoms of Listed Potential Problems Will be Absent, Minimized or Managed (Arrhythmia/Dysrhythmia)  Outcome: Ongoing (interventions implemented as appropriate)

## 2020-03-11 NOTE — PROGRESS NOTES
Name: Cesar Vann ADMIT: 3/7/2020   : 1965  PCP: Bing Eduardo, JUICE    MRN: [id number] LOS: 4 days   AGE/SEX: 54 y.o. male  ROOM: [facility]     Subjective   Subjective   No change really. Still significant abdl discomfort. Small BM after enema.    Review of Systems     Objective   Objective   Vital Signs  Temp:  [98.7 °F (37.1 °C)-101.3 °F (38.5 °C)] 101.3 °F (38.5 °C)  Heart Rate:  [81-94] 94  Resp:  [16-18] 18  BP: (155-172)/(60-83) 172/72  SpO2:  [95 %-97 %] 96 %  on   ;   Device (Oxygen Therapy): room air  Body mass index is 37.45 kg/m².  Physical Exam   Constitutional: He is oriented to person, place, and time. He appears well-developed and well-nourished. No distress.   Morbidly obese   HENT:   Head: Normocephalic and atraumatic.   Mouth/Throat: No oropharyngeal exudate.   Eyes: Pupils are equal, round, and reactive to light. No scleral icterus.   Neck: Neck supple.   Cardiovascular: Normal rate, regular rhythm and intact distal pulses.   No murmur heard.  Pulmonary/Chest: Effort normal and breath sounds normal. No respiratory distress. He has no wheezes.   Abdominal: Bowel sounds are normal. He exhibits distension. There is tenderness.   Musculoskeletal: He exhibits edema (2+).   Neurological: He is alert and oriented to person, place, and time.   Skin: Skin is warm and dry. No rash noted.   Psychiatric: He has a normal mood and affect.   Vitals reviewed.      Results Review:       I reviewed the patient's new clinical results.  Results from last 7 days   Lab Units 20  0556 03/10/20  0558 20  0427 20  0600   WBC 10*3/mm3 12.83* 12.33* 23.96* 23.74*   HEMOGLOBIN g/dL 13.6 13.2 16.7 16.0   PLATELETS 10*3/mm3 139* 154 180 164     Results from last 7 days   Lab Units 20  0816 03/10/20  1625 03/10/20  0558 20  2312 20  0427 20  0600   SODIUM mmol/L 137  --  139  --  140 135*   POTASSIUM mmol/L 3.9 3.9 3.3* 3.9 3.4* 3.4*   CHLORIDE mmol/L 99  --  103   --  101 99   CO2 mmol/L 27.6  --  27.0  --  23.0 23.0   BUN mg/dL 19  --  26*  --  24* 19   CREATININE mg/dL 0.78  --  0.89  --  1.01 0.84   GLUCOSE mg/dL 143*  --  139*  --  147* 203*   Estimated Creatinine Clearance: 143.9 mL/min (by C-G formula based on SCr of 0.78 mg/dL).  Results from last 7 days   Lab Units 03/11/20  0816 03/08/20  0600 03/07/20  0401   ALBUMIN g/dL 2.70* 3.40* 4.60   BILIRUBIN mg/dL 0.6 0.9 0.6   ALK PHOS U/L 60 51 62   AST (SGOT) U/L 31 26 58*   ALT (SGPT) U/L 26 39 74*     Results from last 7 days   Lab Units 03/11/20  0816 03/10/20  0558 03/09/20  0427 03/08/20  0600 03/07/20  0401   CALCIUM mg/dL 7.8* 7.8* 8.4* 8.2*   < > 9.3   ALBUMIN g/dL 2.70*  --   --  3.40*  --  4.60    < > = values in this interval not displayed.       Glucose   Date/Time Value Ref Range Status   03/11/2020 1610 148 (H) 70 - 130 mg/dL Final   03/11/2020 1113 155 (H) 70 - 130 mg/dL Final   03/11/2020 0554 124 70 - 130 mg/dL Final   03/10/2020 2048 153 (H) 70 - 130 mg/dL Final   03/10/2020 1608 158 (H) 70 - 130 mg/dL Final   03/10/2020 1135 154 (H) 70 - 130 mg/dL Final   03/10/2020 0624 134 (H) 70 - 130 mg/dL Final         enoxaparin 40 mg Subcutaneous Q24H   insulin lispro 0-7 Units Subcutaneous 4x Daily With Meals & Nightly   lidocaine 1 patch Transdermal Q12H   metoprolol succinate  mg Oral Daily   polyethylene glycol 34 g Oral BID   senna-docusate sodium 2 tablet Oral Nightly   sodium chloride 10 mL Intravenous Q12H       lactated ringers 75 mL/hr Last Rate: 75 mL/hr (03/10/20 2208)   Diet Clear Liquid       Assessment/Plan     Active Hospital Problems    Diagnosis  POA   • **Acute pancreatitis [K85.90]  Yes   • Acute constipation [K59.00]  Unknown   • Atrial fibrillation (CMS/HCC) [I48.91]  Unknown   • Back pain [M54.9]  Unknown   • Morbid obesity (CMS/HCC) [E66.01]  Unknown   • Essential hypertension [I10]  Unknown   • Hyperglycemia [R73.9]  Unknown   • Sleep apnea [G47.30]  Unknown   • Hypercholesteremia  [E78.00]  Unknown   • Asthma [J45.909]  Unknown      Resolved Hospital Problems   No resolved problems to display.       54 y.o. male admitted with Acute pancreatitis.    · Pancreatitis- etiology uncertain.  Follow-up IgG levels  · WBC has improved from peak but edged up slightly. Ongoing fever concerning.  · Agree with CT abdomen today  · Constipation related to above plus opiate use  · Will try enema and increased oral regimen as well  · F/u CT  · A. fib- in sinus rhythm  · BB per cards, off amio now  · AC to start when clear that no invasive GI workup indicated.   · Morbid obesity      Norberto Simon MD  [facility]  03/11/20  16:56

## 2020-03-11 NOTE — PLAN OF CARE
Problem: Patient Care Overview  Goal: Plan of Care Review  Outcome: Ongoing (interventions implemented as appropriate)  Flowsheets  Taken 3/11/2020 0313  Progress: no change  Outcome Summary: Patient A&O. Medicated several times throughout shift for back pain and spasms. Continue amiodarone gtt and IV fluids. Tap water enema given, with relief. Up ad gaye. Using home CPAP that has heated humidificaiton. SR on monitor. Vital signs stable. No acute distress noted. Will continue to monitor.  Taken 3/11/2020 0049  Plan of Care Reviewed With: patient     Problem: Pain, Chronic (Adult)  Goal: Identify Related Risk Factors and Signs and Symptoms  Outcome: Ongoing (interventions implemented as appropriate)     Problem: Pain, Chronic (Adult)  Goal: Acceptable Pain/Comfort Level and Functional Ability  Outcome: Ongoing (interventions implemented as appropriate)     Problem: Patient Care Overview  Goal: Individualization and Mutuality  Outcome: Ongoing (interventions implemented as appropriate)     Problem: Patient Care Overview  Goal: Discharge Needs Assessment  Outcome: Ongoing (interventions implemented as appropriate)     Problem: Patient Care Overview  Goal: Interprofessional Rounds/Family Conf  Outcome: Ongoing (interventions implemented as appropriate)     Problem: Pancreatitis, Acute/Chronic (Adult)  Goal: Signs and Symptoms of Listed Potential Problems Will be Absent, Minimized or Managed (Pancreatitis, Acute/Chronic)  Outcome: Ongoing (interventions implemented as appropriate)     Problem: Arrhythmia/Dysrhythmia (Symptomatic) (Adult)  Goal: Signs and Symptoms of Listed Potential Problems Will be Absent, Minimized or Managed (Arrhythmia/Dysrhythmia)  Outcome: Ongoing (interventions implemented as appropriate)

## 2020-03-11 NOTE — PROGRESS NOTES
Adult Nutrition  Assessment/PES    Patient Name:  Cesar Vann  YOB: 1965  MRN: [id number]  Admit Date:  3/7/2020    Assessment Date:  3/11/2020    Comments:  Nutrition follow up. Pt remains on clear liquids, 100% of trays. Currently in ^^ pain, States RN aware. Requests RD return at later date for diet education.  Will follow for this and diet ^ and tolernace.     Reason for Assessment     Row Name 03/11/20 1344          Reason for Assessment    Reason For Assessment  follow-up protocol         Nutrition/Diet History     Row Name 03/11/20 1345          Nutrition/Diet History    Factors Affecting Nutritional Intake  abdominal pain;altered gastrointestinal function           Labs/Tests/Procedures/Meds     Row Name 03/11/20 1345          Labs/Procedures/Meds    Lab Results Reviewed  reviewed, pertinent     Lab Results Comments  Glu, alb,         Diagnostic Tests/Procedures    Diagnostic Test/Procedure Reviewed  reviewed, pertinent     Diagnostic Test/Procedures Comments  CT Abd, MRI Abd        Medications    Pertinent Medications Reviewed  reviewed, pertinent     Pertinent Medications Comments  lovenox, insulin, laxatives, ivf         Physical Findings     Row Name 03/11/20 1402          Physical Findings    Overall Physical Appearance  obese;on oxygen therapy     Gastrointestinal  nausea;other (see comments) severe abd pain at present     Skin  other (see comments) b=21           Nutrition Prescription Ordered     Row Name 03/11/20 1403          Nutrition Prescription PO    Current PO Diet  Clear Liquid         Evaluation of Received Nutrient/Fluid Intake     Row Name 03/11/20 1404          PO Evaluation    Number of Meals  2     % PO Intake  100%         Problem/Interventions:    Intervention Goal     Row Name 03/11/20 1401          Intervention Goal    General  Maintain nutrition;Reduce/improve symptoms;Meet nutritional needs for age/condition;Disease management/therapy;Improved nutrition  related lab(s)     PO  Tolerate PO;Advance diet     Weight  Appropriate weight loss         Nutrition Intervention     Row Name 03/11/20 1405          Nutrition Intervention    RD/Tech Action  Care plan reviewd;Follow Tx progress           Education/Evaluation     Row Name 03/11/20 1409          Education    Education  Will Instruct as appropriate;Education not appropriate at this time     Please explain  Other (comment) pt in pain        Monitor/Evaluation    Monitor  Per protocol;Skin status;Symptoms;I&O;PO intake;Pertinent labs;Weight     Education Follow-up  Reinforce PRN           Electronically signed by:  Tatianna Chairez RD  03/11/20 14:11

## 2020-03-11 NOTE — PROGRESS NOTES
Continued Stay Note  [facility]     Patient Name: Cesar Vann  MRN: [id number]  Today's Date: 3/11/2020    Admit Date: 3/7/2020    Discharge Plan     Row Name 03/11/20 1006       Plan    Plan  Plan home.  OLIVIER Sanchez RN    Patient/Family in Agreement with Plan  yes    Plan Comments  Spoke to pt at bedside.  Pt states he does have insurance thru his employer [employer]. CCP attempting to assist pt in getting insurance added to account.   Pt denies any needs.  Plan home.  OLIVIER Sanchez RN        Discharge Codes    No documentation.             Danita Sanchez, RN

## 2020-03-11 NOTE — PROGRESS NOTES
"[facility]    Patient Name: Cesar Vann  :1965  54 y.o.  LOS: 4  Encounter Provider: Poli Saul Jr, MD      Patient Care Team:  Bing Eduardo APRN as PCP - General (Family Medicine)    Chief Complaint: Follow-up paroxysmal atrial fibrillation    Interval History: Sinus rhythm on telemetry overnight.       Objective   Vital Signs  Temp:  [98.7 °F (37.1 °C)-100.1 °F (37.8 °C)] 99.8 °F (37.7 °C)  Heart Rate:  [81-89] 88  Resp:  [16-18] 18  BP: (155-167)/(60-88) 155/60    Intake/Output Summary (Last 24 hours) at 3/11/2020 0943  Last data filed at 3/11/2020 0609  Gross per 24 hour   Intake 6162.88 ml   Output --   Net 6162.88 ml     Flowsheet Rows      First Filed Value   Admission Height  180.3 cm (71\") Documented at 2020 0210   Admission Weight  122 kg (268 lb 8 oz) Documented at 2020 0226            Physical Exam   Constitutional: He is oriented to person, place, and time. He appears well-developed and well-nourished.   HENT:   Head: Normocephalic and atraumatic.   Eyes: Pupils are equal, round, and reactive to light. Conjunctivae are normal.   Neck: No JVD present. No thyromegaly present.   Cardiovascular: Exam reveals no gallop and no friction rub.   No murmur heard.  Pulmonary/Chest: No respiratory distress. He exhibits no tenderness.   Abdominal: Bowel sounds are normal. He exhibits no distension. There is tenderness. There is guarding.   Musculoskeletal: He exhibits no edema or tenderness.   Neurological: He is alert and oriented to person, place, and time.   Skin: No rash noted. No erythema.   Psychiatric: He has a normal mood and affect. Judgment normal.   Vitals reviewed.      Results Review:    Results from last 7 days   Lab Units 20  0816   SODIUM mmol/L 137   POTASSIUM mmol/L 3.9   CHLORIDE mmol/L 99   CO2 mmol/L 27.6   BUN mg/dL 19   CREATININE mg/dL 0.78   GLUCOSE mg/dL 143*   CALCIUM mg/dL 7.8*     Results from last 7 days   Lab Units " 03/07/20  0401   TROPONIN T ng/mL <0.010     Results from last 7 days   Lab Units 03/11/20  0556   WBC 10*3/mm3 12.83*   HEMOGLOBIN g/dL 13.6   HEMATOCRIT % 37.6   PLATELETS 10*3/mm3 139*     Results from last 7 days   Lab Units 03/07/20  0401   INR  1.08         Results from last 7 days   Lab Units 03/07/20  0802   TRIGLYCERIDES mg/dL 80               Medication Review:     enoxaparin 40 mg Subcutaneous Q24H   insulin lispro 0-7 Units Subcutaneous 4x Daily With Meals & Nightly   lidocaine 1 patch Transdermal Q12H   metoprolol succinate  mg Oral Daily   senna-docusate sodium 2 tablet Oral Nightly   sodium chloride 10 mL Intravenous Q12H          amiodarone 0.5 mg/min Last Rate: 0.5 mg/min (03/11/20 0820)   lactated ringers 75 mL/hr Last Rate: 75 mL/hr (03/10/20 2208)       Assessment/Plan   1.  54-year-old gentleman with paroxysmal atrial fibrillation, normal left ventricular systolic function on echo August 2017 now with rapid ventricular response in setting of acute pancreatitis. DC amiodarone. Apparently he stopped taking warfarin a while back but is agreeable to get back on anticoagulation prior to discharge. [id number] score is 1.  Considering pancreatitis and potential need for intervention I think it is appropriate to hold at this point in time.  Continue beta-blocker.    2.  Acute pancreatitis GI following gut rest IV fluids.  MRI unremarkable.   3.  Hypertension follow up pressure occasionally elevated  4.  Obstructive sleep apnea has home pap here  5.   Normal perfusion stress test August 2017  6.   Need for healthcare maintenance provider. States he does not have a PCP       Poli Saul Jr, MD  [facility]  03/11/20  9:43 AM

## 2020-03-11 NOTE — PROGRESS NOTES
[facility]  Inpatient Progress Note    Reason for Follow Up:  pancreatitis    Subjective     Interval History:   He remains tolerating clear liquids without any increase in pain.  He still feels tight and distended his abdomen despite having some small bowel movements with suppositories yesterday.      Current Facility-Administered Medications:   •  acetaminophen (TYLENOL) tablet 650 mg, 650 mg, Oral, Q4H PRN, 650 mg at 03/10/20 0052 **OR** acetaminophen (TYLENOL) 160 MG/5ML solution 650 mg, 650 mg, Oral, Q4H PRN **OR** acetaminophen (TYLENOL) suppository 650 mg, 650 mg, Rectal, Q4H PRN, Dedra Ruff APRN  •  bisacodyl (DULCOLAX) EC tablet 5 mg, 5 mg, Oral, Daily PRN, Dedra Ruff APRN, 5 mg at 03/09/20 2358  •  calcium carbonate (TUMS) chewable tablet 500 mg (200 mg elemental), 2 tablet, Oral, BID PRN, Dedra Ruff APRN, 2 tablet at 03/10/20 1133  •  cyclobenzaprine (FLEXERIL) tablet 10 mg, 10 mg, Oral, TID PRN, Kei Arnold MD, 10 mg at 03/11/20 0820  •  dextrose (D50W) 25 g/ 50mL Intravenous Solution 25 g, 25 g, Intravenous, Q15 Min PRN, Kei Arnold MD  •  dextrose (GLUTOSE) oral gel 15 g, 15 g, Oral, Q15 Min PRN, Kei Arnold MD  •  enoxaparin (LOVENOX) syringe 40 mg, 40 mg, Subcutaneous, Q24H, Kei Arnold MD, 40 mg at 03/10/20 1228  •  glucagon (human recombinant) (GLUCAGEN DIAGNOSTIC) injection 1 mg, 1 mg, Subcutaneous, Q15 Min PRN, Kei Arnold MD  •  hydrALAZINE (APRESOLINE) injection 10 mg, 10 mg, Intravenous, Q6H PRN, Stepan Pacheco MD, 10 mg at 03/08/20 0816  •  HYDROmorphone (DILAUDID) injection 1 mg, 1 mg, Intravenous, Q2H PRN, Norberto Simon MD, 1 mg at 03/11/20 0606  •  insulin lispro (humaLOG) injection 0-7 Units, 0-7 Units, Subcutaneous, 4x Daily With Meals & Nightly, Kei Arnold MD, 2 Units at 03/10/20 2100  •  ketorolac (TORADOL) injection 30 mg, 30 mg, Intravenous, Q6H PRN, Norberto Simon MD, 30 mg at  03/11/20 0809  •  lactated ringers infusion, 75 mL/hr, Intravenous, Continuous, Norberto Simon MD, Last Rate: 75 mL/hr at 03/10/20 2208, 75 mL/hr at 03/10/20 2208  •  lidocaine (LIDODERM) 5 % 1 patch, 1 patch, Transdermal, Q12H, Norberto Simon MD, 1 patch at 03/11/20 0808  •  metoprolol succinate XL (TOPROL-XL) 24 hr tablet 100 mg, 100 mg, Oral, Daily, Kie Arnold MD, 100 mg at 03/11/20 0808  •  nitroglycerin (NITROSTAT) SL tablet 0.4 mg, 0.4 mg, Sublingual, Q5 Min PRN, Dedra Ruff APRN  •  ondansetron (ZOFRAN) tablet 4 mg, 4 mg, Oral, Q6H PRN **OR** ondansetron (ZOFRAN) injection 4 mg, 4 mg, Intravenous, Q6H PRN, Dedra Ruff APRN, 4 mg at 03/11/20 0249  •  oxyCODONE-acetaminophen (PERCOCET) 7.5-325 MG per tablet 1 tablet, 1 tablet, Oral, Q4H PRN, Norberto Simon MD, 1 tablet at 03/09/20 0058  •  polyethylene glycol (MIRALAX) packet 17 g, 17 g, Oral, Daily PRN, Norberto Simon MD, 17 g at 03/10/20 1521  •  potassium chloride (MICRO-K) CR capsule 40 mEq, 40 mEq, Oral, PRN, 40 mEq at 03/10/20 1220 **OR** potassium chloride (KLOR-CON) packet 40 mEq, 40 mEq, Oral, PRN **OR** potassium chloride 10 mEq in 100 mL IVPB, 10 mEq, Intravenous, Q1H PRN, Norberto Simon MD  •  promethazine (PHENERGAN) injection 12.5 mg, 12.5 mg, Intravenous, Q6H PRN, Kei Arnold MD, 12.5 mg at 03/08/20 1423  •  senna-docusate sodium (SENOKOT-S) 8.6-50 MG tablet 2 tablet, 2 tablet, Oral, Nightly, Tracey Davis MD, 2 tablet at 03/10/20 2100  •  sodium chloride 0.9 % flush 10 mL, 10 mL, Intravenous, PRN, Charmaine Vogel MD  •  sodium chloride 0.9 % flush 10 mL, 10 mL, Intravenous, Q12H, Dedra Ruff APRN, 10 mL at 03/10/20 2100  •  sodium chloride 0.9 % flush 10 mL, 10 mL, Intravenous, PRN, Dedra Ruff APRN  Review of Systems:   All systems reviewed and negative except for: GI: Abdominal pain, constipation, abdominal distention      Objective      Vital Signs  Temp:  [98.7 °F (37.1 °C)-100.1 °F (37.8 °C)] 99.8 °F (37.7 °C)  Heart Rate:  [81-89] 88  Resp:  [16-18] 18  BP: (155-167)/(60-88) 155/60  Body mass index is 37.45 kg/m².    Intake/Output Summary (Last 24 hours) at 3/11/2020 1147  Last data filed at 3/11/2020 0915  Gross per 24 hour   Intake 2486.01 ml   Output --   Net 2486.01 ml     I/O this shift:  In: 450 [P.O.:450]  Out: -      Physical Exam:   General: patient awake, alert and cooperative   Eyes: Normal lids and lashes, no scleral icterus   Neck: supple, normal ROM   Skin: warm and dry, not jaundiced   Cardiovascular: regular rhythm and rate, no murmurs auscultated   Pulm: clear to auscultation bilaterally, regular and unlabored   Abdomen: Firm, tender, distended, obese; diminished bowel sounds   Rectal: deferred   Extremities: no rash or edema   Psychiatric: Normal mood and behavior; memory intact     Results Review:     I reviewed the patient's new clinical results.    Results from last 7 days   Lab Units 03/11/20  0556 03/10/20  0558 03/09/20  0427   WBC 10*3/mm3 12.83* 12.33* 23.96*   HEMOGLOBIN g/dL 13.6 13.2 16.7   HEMATOCRIT % 37.6 37.9 48.2   PLATELETS 10*3/mm3 139* 154 180     Results from last 7 days   Lab Units 03/11/20  0816 03/10/20  1625 03/10/20  0558  03/09/20  0427 03/08/20  0600  03/07/20  0401   SODIUM mmol/L 137  --  139  --  140 135*   < > 140   POTASSIUM mmol/L 3.9 3.9 3.3*   < > 3.4* 3.4*   < > 4.7   CHLORIDE mmol/L 99  --  103  --  101 99   < > 103   CO2 mmol/L 27.6  --  27.0  --  23.0 23.0   < > 21.2*   BUN mg/dL 19  --  26*  --  24* 19   < > 18   CREATININE mg/dL 0.78  --  0.89  --  1.01 0.84   < > 1.04   CALCIUM mg/dL 7.8*  --  7.8*  --  8.4* 8.2*   < > 9.3   BILIRUBIN mg/dL 0.6  --   --   --   --  0.9  --  0.6   ALK PHOS U/L 60  --   --   --   --  51  --  62   ALT (SGPT) U/L 26  --   --   --   --  39  --  74*   AST (SGOT) U/L 31  --   --   --   --  26  --  58*   GLUCOSE mg/dL 143*  --  139*  --  147* 203*   < > 346*     < > = values in this interval not displayed.     Results from last 7 days   Lab Units 03/07/20  0401   INR  1.08     Lab Results   Lab Value Date/Time    LIPASE 39 03/10/2020 0558    LIPASE 170 (H) 03/09/2020 0427    LIPASE 561 (H) 03/08/2020 0600    LIPASE >3,000 (H) 03/07/2020 0401       Radiology:  XR Chest PA & Lateral   Final Result   Small likely atelectasis or scarring.       This report was finalized on 3/9/2020 2:16 PM by Dr. Roger Howe M.D.          MRI abdomen w wo contrast mrcp   Final Result   Markedly limited exam as the patient could not cooperate   with breath-hold procedure. Both pre and postcontrast images were   markedly limited.   MR findings of acute pancreatitis. Status post cholecystectomy. No   evidence of choledocholithiasis. No dilatation of the pancreatic duct.   Additional details as above.       This report was finalized on 3/9/2020 12:47 PM by Dr. Bernadine Atwood M.D.          CT Abdomen Pelvis With Contrast   Final Result       1. Inflammatory stranding is seen surrounding the pancreas, suspicious   for pancreatitis. While there is some fluid identified within the upper   abdomen, no organized peripancreatic collections are seen. The pancreas   enhances normally.   2. Area of decreased attenuation involving the inferior aspect of the   spleen. Small splenic infarction is not excluded.       Radiation dose reduction techniques were utilized, including automated   exposure control and exposure modulation based on body size.       This report was finalized on 3/7/2020 5:42 AM by Dr. Brianna Soliz M.D.              Assessment/Plan     Patient Active Problem List   Diagnosis   • Acute pancreatitis   • Essential hypertension   • Hyperglycemia   • Sleep apnea   • Hypercholesteremia   • Asthma   • Atrial fibrillation (CMS/HCC)   • Back pain   • Morbid obesity (CMS/HCC)   • Acute constipation       Impression  1. Acute pancreatitis: no choledocholithiasis as per imaging;  lipase has improved, white count has improved though symptoms have not    2. Upper abdominal pain: Persist with above.  He also feels quite distended    3.  Abdominal distention: Persists, unchanged    4.  Constipation: Small results with suppositories        Plan  Repeat CT imaging given persistence of pain  Continue clear liquids for now  Continue bowel regimen  Continue analgesia, IV fluids, supportive care    I discussed the patients findings and my recommendations with patient.    Tracey Davis MD

## 2020-03-12 LAB
GLUCOSE BLDC GLUCOMTR-MCNC: 142 MG/DL (ref 70–130)
GLUCOSE BLDC GLUCOMTR-MCNC: 150 MG/DL (ref 70–130)
GLUCOSE BLDC GLUCOMTR-MCNC: 163 MG/DL (ref 70–130)
GLUCOSE BLDC GLUCOMTR-MCNC: 198 MG/DL (ref 70–130)
MAGNESIUM SERPL-MCNC: 2.5 MG/DL (ref 1.6–2.6)
POTASSIUM BLD-SCNC: 3.1 MMOL/L (ref 3.5–5.2)

## 2020-03-12 PROCEDURE — 93010 ELECTROCARDIOGRAM REPORT: CPT | Performed by: INTERNAL MEDICINE

## 2020-03-12 PROCEDURE — 25010000002 HYDROMORPHONE 1 MG/ML SOLUTION: Performed by: HOSPITALIST

## 2020-03-12 PROCEDURE — 99233 SBSQ HOSP IP/OBS HIGH 50: CPT | Performed by: NURSE PRACTITIONER

## 2020-03-12 PROCEDURE — 25010000002 KETOROLAC TROMETHAMINE PER 15 MG: Performed by: HOSPITALIST

## 2020-03-12 PROCEDURE — 25010000002 AMIODARONE IN DEXTROSE 5% 150-4.21 MG/100ML-% SOLUTION: Performed by: NURSE PRACTITIONER

## 2020-03-12 PROCEDURE — 25010000002 ENOXAPARIN PER 10 MG: Performed by: HOSPITALIST

## 2020-03-12 PROCEDURE — 82962 GLUCOSE BLOOD TEST: CPT

## 2020-03-12 PROCEDURE — 25010000002 HYDRALAZINE PER 20 MG: Performed by: INTERNAL MEDICINE

## 2020-03-12 PROCEDURE — 83735 ASSAY OF MAGNESIUM: CPT | Performed by: INTERNAL MEDICINE

## 2020-03-12 PROCEDURE — 84132 ASSAY OF SERUM POTASSIUM: CPT | Performed by: INTERNAL MEDICINE

## 2020-03-12 PROCEDURE — 63710000001 INSULIN LISPRO (HUMAN) PER 5 UNITS: Performed by: HOSPITALIST

## 2020-03-12 PROCEDURE — 25010000002 AMIODARONE IN DEXTROSE 5% 360-4.14 MG/200ML-% SOLUTION: Performed by: NURSE PRACTITIONER

## 2020-03-12 PROCEDURE — 93005 ELECTROCARDIOGRAM TRACING: CPT | Performed by: INTERNAL MEDICINE

## 2020-03-12 PROCEDURE — 99232 SBSQ HOSP IP/OBS MODERATE 35: CPT | Performed by: INTERNAL MEDICINE

## 2020-03-12 RX ORDER — SENNA AND DOCUSATE SODIUM 50; 8.6 MG/1; MG/1
2 TABLET, FILM COATED ORAL 2 TIMES DAILY
Status: DISCONTINUED | OUTPATIENT
Start: 2020-03-12 | End: 2020-03-19 | Stop reason: HOSPADM

## 2020-03-12 RX ORDER — METOPROLOL SUCCINATE 100 MG/1
100 TABLET, EXTENDED RELEASE ORAL 2 TIMES DAILY
Status: DISCONTINUED | OUTPATIENT
Start: 2020-03-12 | End: 2020-03-19 | Stop reason: HOSPADM

## 2020-03-12 RX ORDER — POTASSIUM CHLORIDE 750 MG/1
40 CAPSULE, EXTENDED RELEASE ORAL DAILY
Status: DISCONTINUED | OUTPATIENT
Start: 2020-03-12 | End: 2020-03-19 | Stop reason: HOSPADM

## 2020-03-12 RX ADMIN — KETOROLAC TROMETHAMINE 30 MG: 30 INJECTION, SOLUTION INTRAMUSCULAR at 17:21

## 2020-03-12 RX ADMIN — POTASSIUM CHLORIDE 40 MEQ: 10 CAPSULE, COATED, EXTENDED RELEASE ORAL at 09:29

## 2020-03-12 RX ADMIN — SODIUM CHLORIDE, POTASSIUM CHLORIDE, SODIUM LACTATE AND CALCIUM CHLORIDE 75 ML/HR: 600; 310; 30; 20 INJECTION, SOLUTION INTRAVENOUS at 12:05

## 2020-03-12 RX ADMIN — SENNOSIDES AND DOCUSATE SODIUM 2 TABLET: 8.6; 5 TABLET ORAL at 20:56

## 2020-03-12 RX ADMIN — AMIODARONE HYDROCHLORIDE 0.5 MG/MIN: 1.8 INJECTION, SOLUTION INTRAVENOUS at 09:57

## 2020-03-12 RX ADMIN — INSULIN LISPRO 2 UNITS: 100 INJECTION, SOLUTION INTRAVENOUS; SUBCUTANEOUS at 17:21

## 2020-03-12 RX ADMIN — METOPROLOL SUCCINATE 100 MG: 100 TABLET, FILM COATED, EXTENDED RELEASE ORAL at 05:56

## 2020-03-12 RX ADMIN — AMIODARONE HYDROCHLORIDE 0.5 MG/MIN: 1.8 INJECTION, SOLUTION INTRAVENOUS at 14:46

## 2020-03-12 RX ADMIN — AMIODARONE HYDROCHLORIDE 150 MG: 1.5 INJECTION, SOLUTION INTRAVENOUS at 09:29

## 2020-03-12 RX ADMIN — HYDRALAZINE HYDROCHLORIDE 10 MG: 20 INJECTION INTRAMUSCULAR; INTRAVENOUS at 02:40

## 2020-03-12 RX ADMIN — METOPROLOL TARTRATE 5 MG: 5 INJECTION, SOLUTION INTRAVENOUS at 04:53

## 2020-03-12 RX ADMIN — CYCLOBENZAPRINE 10 MG: 10 TABLET, FILM COATED ORAL at 00:22

## 2020-03-12 RX ADMIN — HYDROMORPHONE HYDROCHLORIDE 1 MG: 1 INJECTION, SOLUTION INTRAMUSCULAR; INTRAVENOUS; SUBCUTANEOUS at 05:11

## 2020-03-12 RX ADMIN — LIDOCAINE 1 PATCH: 50 PATCH CUTANEOUS at 20:57

## 2020-03-12 RX ADMIN — POTASSIUM CHLORIDE 40 MEQ: 10 CAPSULE, COATED, EXTENDED RELEASE ORAL at 05:05

## 2020-03-12 RX ADMIN — LIDOCAINE 1 PATCH: 50 PATCH CUTANEOUS at 08:39

## 2020-03-12 RX ADMIN — METOPROLOL TARTRATE 5 MG: 5 INJECTION, SOLUTION INTRAVENOUS at 04:30

## 2020-03-12 RX ADMIN — KETOROLAC TROMETHAMINE 30 MG: 30 INJECTION, SOLUTION INTRAMUSCULAR at 08:47

## 2020-03-12 RX ADMIN — OXYCODONE HYDROCHLORIDE AND ACETAMINOPHEN 1 TABLET: 7.5; 325 TABLET ORAL at 00:22

## 2020-03-12 RX ADMIN — HYDROMORPHONE HYDROCHLORIDE 1 MG: 1 INJECTION, SOLUTION INTRAMUSCULAR; INTRAVENOUS; SUBCUTANEOUS at 18:59

## 2020-03-12 RX ADMIN — HYDROMORPHONE HYDROCHLORIDE 1 MG: 1 INJECTION, SOLUTION INTRAMUSCULAR; INTRAVENOUS; SUBCUTANEOUS at 13:32

## 2020-03-12 RX ADMIN — HYDROMORPHONE HYDROCHLORIDE 1 MG: 1 INJECTION, SOLUTION INTRAMUSCULAR; INTRAVENOUS; SUBCUTANEOUS at 23:56

## 2020-03-12 RX ADMIN — HYDROMORPHONE HYDROCHLORIDE 1 MG: 1 INJECTION, SOLUTION INTRAMUSCULAR; INTRAVENOUS; SUBCUTANEOUS at 09:34

## 2020-03-12 RX ADMIN — ENOXAPARIN SODIUM 40 MG: 40 INJECTION SUBCUTANEOUS at 17:21

## 2020-03-12 RX ADMIN — AMIODARONE HYDROCHLORIDE 0.5 MG/MIN: 1.8 INJECTION, SOLUTION INTRAVENOUS at 20:56

## 2020-03-12 RX ADMIN — KETOROLAC TROMETHAMINE 30 MG: 30 INJECTION, SOLUTION INTRAMUSCULAR at 02:41

## 2020-03-12 RX ADMIN — METOPROLOL SUCCINATE 100 MG: 100 TABLET, FILM COATED, EXTENDED RELEASE ORAL at 20:56

## 2020-03-12 RX ADMIN — CYCLOBENZAPRINE 10 MG: 10 TABLET, FILM COATED ORAL at 17:38

## 2020-03-12 RX ADMIN — POTASSIUM CHLORIDE 40 MEQ: 10 CAPSULE, COATED, EXTENDED RELEASE ORAL at 13:32

## 2020-03-12 RX ADMIN — INSULIN LISPRO 2 UNITS: 100 INJECTION, SOLUTION INTRAVENOUS; SUBCUTANEOUS at 21:08

## 2020-03-12 NOTE — PLAN OF CARE
Pt c/o pain. Given PRN pain meds when indicated. IVF going. Amiodarone gtt started and going . HR more controlled. VSS. Will cont to monitor pt.      Problem: Pain, Chronic (Adult)  Goal: Identify Related Risk Factors and Signs and Symptoms  Outcome: Ongoing (interventions implemented as appropriate)  Goal: Acceptable Pain/Comfort Level and Functional Ability  Outcome: Ongoing (interventions implemented as appropriate)     Problem: Patient Care Overview  Goal: Plan of Care Review  Outcome: Ongoing (interventions implemented as appropriate)  Goal: Individualization and Mutuality  Outcome: Ongoing (interventions implemented as appropriate)  Goal: Discharge Needs Assessment  Outcome: Ongoing (interventions implemented as appropriate)  Goal: Interprofessional Rounds/Family Conf  Outcome: Ongoing (interventions implemented as appropriate)     Problem: Pancreatitis, Acute/Chronic (Adult)  Goal: Signs and Symptoms of Listed Potential Problems Will be Absent, Minimized or Managed (Pancreatitis, Acute/Chronic)  Outcome: Ongoing (interventions implemented as appropriate)     Problem: Arrhythmia/Dysrhythmia (Symptomatic) (Adult)  Goal: Signs and Symptoms of Listed Potential Problems Will be Absent, Minimized or Managed (Arrhythmia/Dysrhythmia)  Outcome: Ongoing (interventions implemented as appropriate)

## 2020-03-12 NOTE — PROGRESS NOTES
"CC: a fib    Interval History: amiodarone was stopped yesterday.  He did have recurrent rapid atrial fibrillation.  Only has shortness of breath when his pain is really bad.  He had CT of the abdomen yesterday.      Vital Signs  Temp:  [99.2 °F (37.3 °C)-101.3 °F (38.5 °C)] 99.8 °F (37.7 °C)  Heart Rate:  [] 137  Resp:  [16-18] 16  BP: (137-198)/() 137/84    Intake/Output Summary (Last 24 hours) at 3/12/2020 0848  Last data filed at 3/12/2020 0607  Gross per 24 hour   Intake 2637.5 ml   Output --   Net 2637.5 ml     Flowsheet Rows      First Filed Value   Admission Height  180.3 cm (71\") Documented at 03/07/2020 0210   Admission Weight  122 kg (268 lb 8 oz) Documented at 03/07/2020 0226          PHYSICAL EXAM:  General: No acute distress  Resp:NL Rate, unlabored, diminished bases  CV: Tachycardic rate and irregular rhythm, NL PMI, Nl S1 and S2, no Murmur, no gallop, no rub, No JVD. Normal pedal pulses  ABD:Nl sounds, no masses or tenderness, nondistended, no guarding or rebound  Neuro: alert,cooperative and oriented  Extr: Tight trace edema, no cyanosis, moves all extremities      Results Review:    Results from last 7 days   Lab Units 03/12/20  0359 03/11/20  0816   SODIUM mmol/L  --  137   POTASSIUM mmol/L 3.1* 3.9   CHLORIDE mmol/L  --  99   CO2 mmol/L  --  27.6   BUN mg/dL  --  19   CREATININE mg/dL  --  0.78   GLUCOSE mg/dL  --  143*   CALCIUM mg/dL  --  7.8*     Results from last 7 days   Lab Units 03/07/20  0401   TROPONIN T ng/mL <0.010     Results from last 7 days   Lab Units 03/11/20  0556   WBC 10*3/mm3 12.83*   HEMOGLOBIN g/dL 13.6   HEMATOCRIT % 37.6   PLATELETS 10*3/mm3 139*     Results from last 7 days   Lab Units 03/07/20  0401   INR  1.08         Results from last 7 days   Lab Units 03/12/20  0359   MAGNESIUM mg/dL 2.5     Results from last 7 days   Lab Units 03/07/20  0802   TRIGLYCERIDES mg/dL 80     I reviewed the patient's new clinical results.  I personally viewed and interpreted " the patient's EKG/Telemetry data        Medication Review:   Meds reviewed      lactated ringers 75 mL/hr Last Rate: 75 mL/hr (03/10/20 2203)       Assessment/Plan    1.  54-year-old gentleman with paroxysmal atrial fibrillation, normal left ventricular systolic function on echo August 2017 now with rapid ventricular response in setting of acute pancreatitis.   He stopped taking warfarin a while back but is agreeable to get back on anticoagulation prior to discharge. [id number] score is 1.  Considering pancreatitis and potential need for intervention I think it is appropriate to hold at this point in time.  2.  Acute pancreatitis GI following gut rest IV fluids.  MRI unremarkable.  CT shows progression  3.  Hypertension follow up pressure occasionally elevated-increase beta-blocker  4.  Obstructive sleep apnea has home pap here  5.   Normal perfusion stress test August 2017  6.    Constipation per GI -had some loose stool overnight  7.Need for healthcare maintenance provider. States he does not have a PCP       Had recurrent a fib last night. IV lopressor given this a.m. however heart rate while I was in the room was anywhere from 120-170 bpm in a fib.  We will give him IV loading dose of amiodarone and restart the maintenance drip.  I have increased his metoprolol succinate to twice daily.  We will follow his blood pressure.  His potassium remains low we will start some daily replacement.  CT abdomen shows progression of pancreatitis.  Will await plans from GI. Continue to hold plan for anticoagulation considering possible need for intervention.     JUICE Case  03/12/20  08:48

## 2020-03-12 NOTE — PROGRESS NOTES
"[facility], [place], operated by [facility]  Inpatient Progress Note    Reason for Follow Up:  pancreatitis    Subjective     Interval History:   C/o upper abdominal discomfort. \"I feel like my bowels need to move\".     Current Facility-Administered Medications:   •  acetaminophen (TYLENOL) tablet 650 mg, 650 mg, Oral, Q4H PRN, 650 mg at 03/11/20 1359 **OR** acetaminophen (TYLENOL) 160 MG/5ML solution 650 mg, 650 mg, Oral, Q4H PRN **OR** acetaminophen (TYLENOL) suppository 650 mg, 650 mg, Rectal, Q4H PRN, Dedra Ruff APRN  •  amiodarone (NEXTERONE) 360 mg/200 mL (1.8 mg/mL) infusion, 0.5 mg/min, Intravenous, Continuous, Gabby Teresa APRN, Last Rate: 16.67 mL/hr at 03/12/20 0957, 0.5 mg/min at 03/12/20 0957  •  bisacodyl (DULCOLAX) EC tablet 5 mg, 5 mg, Oral, Daily PRN, Dedra Ruff APRN, 5 mg at 03/09/20 2358  •  bisacodyl (DULCOLAX) suppository 10 mg, 10 mg, Rectal, Daily PRN, Tracey Davis MD  •  calcium carbonate (TUMS) chewable tablet 500 mg (200 mg elemental), 2 tablet, Oral, BID PRN, Dedra Ruff APRN, 2 tablet at 03/10/20 1133  •  cyclobenzaprine (FLEXERIL) tablet 10 mg, 10 mg, Oral, TID PRN, Kei Arnold MD, 10 mg at 03/12/20 0022  •  dextrose (D50W) 25 g/ 50mL Intravenous Solution 25 g, 25 g, Intravenous, Q15 Min PRN, Kei Arnold MD  •  dextrose (GLUTOSE) oral gel 15 g, 15 g, Oral, Q15 Min PRN, Kei Arnold MD  •  enoxaparin (LOVENOX) syringe 40 mg, 40 mg, Subcutaneous, Q24H, Kei Arnold MD, 40 mg at 03/11/20 1656  •  glucagon (human recombinant) (GLUCAGEN DIAGNOSTIC) injection 1 mg, 1 mg, Subcutaneous, Q15 Min PRN, Kei Arnold MD  •  hydrALAZINE (APRESOLINE) injection 10 mg, 10 mg, Intravenous, Q6H PRN, Stepan Pacheco MD, 10 mg at 03/12/20 0240  •  HYDROmorphone (DILAUDID) injection 1 mg, 1 mg, Intravenous, Q2H PRN, Norberto Simon MD, 1 mg at 03/12/20 0934  •  insulin lispro (humaLOG) injection 0-7 Units, 0-7 Units, Subcutaneous, 4x Daily With Meals & " Nightly, Kei Arnold MD, 2 Units at 03/11/20 2031  •  ketorolac (TORADOL) injection 30 mg, 30 mg, Intravenous, Q6H PRN, Norberto Simon MD, 30 mg at 03/12/20 0847  •  lactated ringers infusion, 75 mL/hr, Intravenous, Continuous, Norberto Simon MD, Last Rate: 75 mL/hr at 03/12/20 1205, 75 mL/hr at 03/12/20 1205  •  lidocaine (LIDODERM) 5 % 1 patch, 1 patch, Transdermal, Q12H, Norberto Simon MD, 1 patch at 03/12/20 0839  •  metoprolol succinate XL (TOPROL-XL) 24 hr tablet 100 mg, 100 mg, Oral, BID, MalickVenkatesh cortesyssa, APRN  •  metoprolol tartrate (LOPRESSOR) injection 5 mg, 5 mg, Intravenous, Q5 Min PRN, Gabby Tersea, APRN  •  nitroglycerin (NITROSTAT) SL tablet 0.4 mg, 0.4 mg, Sublingual, Q5 Min PRN, Dedra Ruff, APRN  •  ondansetron (ZOFRAN) tablet 4 mg, 4 mg, Oral, Q6H PRN **OR** ondansetron (ZOFRAN) injection 4 mg, 4 mg, Intravenous, Q6H PRN, Dedra Ruff, APRN, 4 mg at 03/11/20 0249  •  oxyCODONE-acetaminophen (PERCOCET) 7.5-325 MG per tablet 1 tablet, 1 tablet, Oral, Q4H PRN, Norberto Simon MD, 1 tablet at 03/12/20 0022  •  polyethylene glycol (MIRALAX) packet 17 g, 17 g, Oral, Daily PRN, Norberto Simon MD, 17 g at 03/10/20 1521  •  polyethylene glycol (MIRALAX) packet 34 g, 34 g, Oral, BID, Tracey Davis MD  •  potassium chloride (MICRO-K) CR capsule 40 mEq, 40 mEq, Oral, PRN, 40 mEq at 03/12/20 0929 **OR** potassium chloride (KLOR-CON) packet 40 mEq, 40 mEq, Oral, PRN **OR** potassium chloride 10 mEq in 100 mL IVPB, 10 mEq, Intravenous, Q1H PRN, Norberto Simon MD  •  potassium chloride (MICRO-K) CR capsule 40 mEq, 40 mEq, Oral, Daily, Gabby Teresa, JUICE  •  promethazine (PHENERGAN) injection 12.5 mg, 12.5 mg, Intravenous, Q6H PRN, Kei Arnold MD, 12.5 mg at 03/08/20 1423  •  senna-docusate sodium (SENOKOT-S) 8.6-50 MG tablet 2 tablet, 2 tablet, Oral, Nightly, Tracey Davis MD, 2 tablet at 03/11/20 2031  •  sodium  chloride 0.9 % flush 10 mL, 10 mL, Intravenous, PRN, Charmaine Vogel MD  •  sodium chloride 0.9 % flush 10 mL, 10 mL, Intravenous, Q12H, Dedra Ruff APRN, 10 mL at 03/11/20 2031  •  sodium chloride 0.9 % flush 10 mL, 10 mL, Intravenous, PRN, Dedra Ruff APRN  Review of Systems:    The following systems were reviewed and negative;  constitution, ENT, respiratory, genitourinary, breast, hematologic / lymphatic, musculoskeletal and neurological    Objective     Vital Signs  Temp:  [99.2 °F (37.3 °C)-101.3 °F (38.5 °C)] 99.8 °F (37.7 °C)  Heart Rate:  [] 137  Resp:  [16-18] 16  BP: (137-198)/() 137/84  Body mass index is 37.45 kg/m².    Intake/Output Summary (Last 24 hours) at 3/12/2020 1253  Last data filed at 3/12/2020 0607  Gross per 24 hour   Intake 2187.5 ml   Output --   Net 2187.5 ml     No intake/output data recorded.     Physical Exam:   General: patient awake, alert and cooperative. Obese   Eyes: Normal lids and lashes, no scleral icterus   Neck: supple, normal ROM   Skin: warm and dry, not jaundiced   Cardiovascular: regular rhythm and rate, no murmurs auscultated   Pulm: clear to auscultation bilaterally, regular and unlabored   Abdomen: mild upper abdominal tenderness, +distended; decreased bowel sounds   Extremities: no rash or edema   Psychiatric: Normal mood and behavior; memory intact  Rectal exam - not impacted, no masses, brown soft stool in vault.     Results Review:     I reviewed the patient's new clinical results.    Results from last 7 days   Lab Units 03/11/20  0556 03/10/20  0558 03/09/20  0427   WBC 10*3/mm3 12.83* 12.33* 23.96*   HEMOGLOBIN g/dL 13.6 13.2 16.7   HEMATOCRIT % 37.6 37.9 48.2   PLATELETS 10*3/mm3 139* 154 180     Results from last 7 days   Lab Units 03/12/20  0359 03/11/20  0816 03/10/20  1625 03/10/20  0558  03/09/20  0427 03/08/20  0600  03/07/20  0401   SODIUM mmol/L  --  137  --  139  --  140 135*   < > 140   POTASSIUM mmol/L 3.1* 3.9 3.9 3.3*    < > 3.4* 3.4*   < > 4.7   CHLORIDE mmol/L  --  99  --  103  --  101 99   < > 103   CO2 mmol/L  --  27.6  --  27.0  --  23.0 23.0   < > 21.2*   BUN mg/dL  --  19  --  26*  --  24* 19   < > 18   CREATININE mg/dL  --  0.78  --  0.89  --  1.01 0.84   < > 1.04   CALCIUM mg/dL  --  7.8*  --  7.8*  --  8.4* 8.2*   < > 9.3   BILIRUBIN mg/dL  --  0.6  --   --   --   --  0.9  --  0.6   ALK PHOS U/L  --  60  --   --   --   --  51  --  62   ALT (SGPT) U/L  --  26  --   --   --   --  39  --  74*   AST (SGOT) U/L  --  31  --   --   --   --  26  --  58*   GLUCOSE mg/dL  --  143*  --  139*  --  147* 203*   < > 346*    < > = values in this interval not displayed.     Results from last 7 days   Lab Units 03/07/20  0401   INR  1.08     Lab Results   Lab Value Date/Time    LIPASE 39 03/10/2020 0558    LIPASE 170 (H) 03/09/2020 0427    LIPASE 561 (H) 03/08/2020 0600    LIPASE >3,000 (H) 03/07/2020 0401       Radiology:  CT Abdomen Pelvis With Contrast   Final Result   1. Findings consistent with moderately severe inflammatory change   surrounding the pancreas consistent with pancreatitis. This finding has   progressed since the previous study of 03/07/2020.   2. No pancreatic masses or pseudocysts are seen.   3. Small amount of free fluid in the abdomen and pelvis.   4. Status post cholecystectomy.   5. Small bilateral pleural effusions and bibasilar atelectasis.   6. Several small mesenteric nodes. These could be reassessed on a   follow-up study.               Radiation dose reduction techniques were utilized, including automated   exposure control and exposure modulation based on body size.       This report was finalized on 3/12/2020 8:36 AM by Dr. Kilo Owens M.D.          XR Chest PA & Lateral   Final Result   Small likely atelectasis or scarring.       This report was finalized on 3/9/2020 2:16 PM by Dr. Roger Howe M.D.          MRI abdomen w wo contrast mrcp   Final Result   Markedly limited exam as the patient  could not cooperate   with breath-hold procedure. Both pre and postcontrast images were   markedly limited.   MR findings of acute pancreatitis. Status post cholecystectomy. No   evidence of choledocholithiasis. No dilatation of the pancreatic duct.   Additional details as above.       This report was finalized on 3/9/2020 12:47 PM by Dr. Bernadine Atwood M.D.          CT Abdomen Pelvis With Contrast   Final Result       1. Inflammatory stranding is seen surrounding the pancreas, suspicious   for pancreatitis. While there is some fluid identified within the upper   abdomen, no organized peripancreatic collections are seen. The pancreas   enhances normally.   2. Area of decreased attenuation involving the inferior aspect of the   spleen. Small splenic infarction is not excluded.       Radiation dose reduction techniques were utilized, including automated   exposure control and exposure modulation based on body size.       This report was finalized on 3/7/2020 5:42 AM by Dr. Brianna Soliz M.D.              Assessment/Plan     Patient Active Problem List   Diagnosis   • Acute pancreatitis   • Essential hypertension   • Hyperglycemia   • Sleep apnea   • Hypercholesteremia   • Asthma   • Atrial fibrillation (CMS/HCC)   • Back pain   • Morbid obesity (CMS/HCC)   • Acute constipation       Assessment/Recommendations:    1. Acute pancreatitis: no choledocholithiasis as per imaging; lipase has improved, white count has improved though symptoms have not. CT abd 3/11 shows worsening pancreatitis. Continue gut rest for now.      2. Upper abdominal pain: Persist with above.  He also feels quite distended     3.  Abdominal distention: Persists, unchanged     4.  Constipation: Small results with suppositories The patient is not impacted.      I discussed the patients findings and my recommendations with patient and family.    Devan Williamson MD

## 2020-03-12 NOTE — PROGRESS NOTES
" LOS: 5 days     Name: Cesar Vann  Age: 54 y.o.  Sex: male  :  1965  MRN: [id number]         Primary Care Physician: Bing Eduardo APRN    Subjective   Subjective  At the time my visit he states that his abdominal pain is somewhat improved but overall for the majority of the time it is unchanged.  No nausea or vomiting.  Had several loose bowel movements yesterday.  No fevers in the past 24 hours.  He went into rapid atrial fibrillation again last night.  Notes that he is developing a little bit of swelling in his feet and ankles.    Objective   Vital Signs  Temp:  [99.2 °F (37.3 °C)-99.8 °F (37.7 °C)] 99.5 °F (37.5 °C)  Heart Rate:  [] 113  Resp:  [16-18] 17  BP: (132-198)/() 132/81  Body mass index is 37.45 kg/m².    Objective:  General Appearance:  Comfortable and in no acute distress.    Vital signs: (most recent): Blood pressure 132/81, pulse 113, temperature 99.5 °F (37.5 °C), temperature source Oral, resp. rate 17, height 180.3 cm (71\"), weight 122 kg (268 lb 8 oz), SpO2 93 %.    Lungs:  Normal effort and normal respiratory rate.    Heart: Normal rate.  Regular rhythm.    Abdomen: Abdomen is soft and distended.  Bowel sounds are normal.   There is epigastric tenderness.    Extremities: There is dependent edema.  There is no local swelling.  (Trace to 1+ pitting edema of the bilateral feet and ankles)  Neurological: Patient is alert and oriented to person, place and time.    Skin:  Warm and dry.              Results Review:       I reviewed the patient's new clinical results.    Results from last 7 days   Lab Units 20  0556 03/10/20  0558 20  0427 20  0600 20  0802 20  0401   WBC 10*3/mm3 12.83* 12.33* 23.96* 23.74* 13.97* 15.09*   HEMOGLOBIN g/dL 13.6 13.2 16.7 16.0 15.3 16.5   PLATELETS 10*3/mm3 139* 154 180 164 171 163     Results from last 7 days   Lab Units 20  0359 20  0816 03/10/20  1625 03/10/20  0558 20  2312 " 03/09/20  0427 03/08/20  0600 03/07/20  0802 03/07/20  0401   SODIUM mmol/L  --  137  --  139  --  140 135* 141 140   POTASSIUM mmol/L 3.1* 3.9 3.9 3.3* 3.9 3.4* 3.4* 4.7 4.7   CHLORIDE mmol/L  --  99  --  103  --  101 99 105 103   CO2 mmol/L  --  27.6  --  27.0  --  23.0 23.0 23.7 21.2*   BUN mg/dL  --  19  --  26*  --  24* 19 18 18   CREATININE mg/dL  --  0.78  --  0.89  --  1.01 0.84 1.06 1.04   CALCIUM mg/dL  --  7.8*  --  7.8*  --  8.4* 8.2* 8.5* 9.3   GLUCOSE mg/dL  --  143*  --  139*  --  147* 203* 263* 346*     Results from last 7 days   Lab Units 03/07/20  0401   INR  1.08       Scheduled Meds:     enoxaparin 40 mg Subcutaneous Q24H   insulin lispro 0-7 Units Subcutaneous 4x Daily With Meals & Nightly   lidocaine 1 patch Transdermal Q12H   metoprolol succinate  mg Oral BID   polyethylene glycol 34 g Oral BID   potassium chloride 40 mEq Oral Daily   senna-docusate sodium 2 tablet Oral BID   sodium chloride 10 mL Intravenous Q12H     PRN Meds:   •  acetaminophen **OR** acetaminophen **OR** acetaminophen  •  bisacodyl  •  bisacodyl  •  calcium carbonate  •  cyclobenzaprine  •  dextrose  •  dextrose  •  glucagon (human recombinant)  •  hydrALAZINE  •  HYDROmorphone  •  ketorolac  •  metoprolol tartrate  •  nitroglycerin  •  ondansetron **OR** ondansetron  •  oxyCODONE-acetaminophen  •  polyethylene glycol  •  potassium chloride **OR** potassium chloride **OR** potassium chloride  •  promethazine  •  sodium chloride  •  sodium chloride  Continuous Infusions:    amiodarone 0.5 mg/min Last Rate: 0.5 mg/min (03/12/20 3160)   lactated ringers 75 mL/hr Last Rate: 75 mL/hr (03/12/20 1205)       Assessment/Plan   Active Hospital Problems    Diagnosis  POA   • **Acute pancreatitis [K85.90]  Yes   • Acute constipation [K59.00]  Unknown   • Atrial fibrillation (CMS/HCC) [I48.91]  Unknown   • Back pain [M54.9]  Unknown   • Morbid obesity (CMS/HCC) [E66.01]  Unknown   • Essential hypertension [I10]  Unknown   •  Hyperglycemia [R73.9]  Unknown   • Sleep apnea [G47.30]  Unknown   • Hypercholesteremia [E78.00]  Unknown   • Asthma [J45.479]  Unknown      Resolved Hospital Problems   No resolved problems to display.       Assessment & Plan    54 y.o. male admitted with Acute pancreatitis.     · Pancreatitis- etiology uncertain.  Follow-up IgG levels  § Repeat CT of the abdomen pelvis yesterday showed worsening inflammation associated with his pancreatitis but no pseudocyst or abscess formation  § Continue gut rest along with IV fluids, analgesics  · Constipation related to above plus opiate use  § Several loose bowel movements yesterday.  Continue current bowel regimen.  · A. fib-  went back into rapid A. fib last night  § Now back on amiodarone drip with better control of heart rate in the 90s to low 100s  § Cardiology following  § No anticoagulation for now beyond prophylactic Lovenox until we see what happens with his acute pancreatitis  § Replace potassium  · Morbid obesity    Tate Mckoy MD  [facility]  03/12/20  2:07 PM

## 2020-03-12 NOTE — NURSING NOTE
Call placed to Cardiology regarding patient's rhythm change. STAT ekg obtained, and stat potassium and mag ordered per standing orders. Spoke to JUICE Pavon regarding patient's rhythm change from SR to A-fib with a rate of 110's to 160-170's at times. She advised that she would place orders for IV Beta Blocker with an attempt to get patient's heart rate down before starting any drips back prior to rounding of the Cardiologist this AM. Patient is in no acute distress at this time. Will continue to monitor.

## 2020-03-12 NOTE — PLAN OF CARE
I am the on-call provider.  I received a page that patient went back to the atrial fibrillation with rapid ventricular response heart rate ranging from 120s to 160s.  No reported symptoms.  Potassium and magnesium are drawn and pending.  Blood pressure is elevated.  He had a dose of IV hydralazine for an hour ago.  Will try IV beta-blocker 5 mg every 5 minutes x2 doses.  Will defer to MD or provider rounding on him today to resume amiodarone if felt necessary as it was discontinued per Dr. Saul yesterday.  They can also address anticoagulation as currently it is being held due to pancreatitis and possible need for intervention and he had already self discontinued at home while back but was agreeable to present.

## 2020-03-12 NOTE — PLAN OF CARE
Problem: Patient Care Overview  Goal: Plan of Care Review  Outcome: Ongoing (interventions implemented as appropriate)  Flowsheets  Taken 3/11/2020 0313  Progress: no change  Taken 3/12/2020 0012  Plan of Care Reviewed With: patient  Taken 3/12/2020 0517  Outcome Summary: Patient A&O. Complaints of constant back pain with spasms, and occasional abdominal pain. Medicated per prn orders. Continue IV fluids. Up ad gaye. Pt had 2 BM's this shift. Tolerating home CPAP with heated humidification. Pt went into a-fib, ekg obtained, potassium and mag labs drawn. Cardiology called, IV Lopressor orders obtained x2 doses. Hydralazine prn given x1 for elevated BP. No acute distress noted at this time. Will continue to monitor.     Problem: Pain, Chronic (Adult)  Goal: Identify Related Risk Factors and Signs and Symptoms  Outcome: Ongoing (interventions implemented as appropriate)     Problem: Pain, Chronic (Adult)  Goal: Acceptable Pain/Comfort Level and Functional Ability  Outcome: Ongoing (interventions implemented as appropriate)     Problem: Patient Care Overview  Goal: Individualization and Mutuality  Outcome: Ongoing (interventions implemented as appropriate)     Problem: Patient Care Overview  Goal: Discharge Needs Assessment  Outcome: Ongoing (interventions implemented as appropriate)     Problem: Patient Care Overview  Goal: Interprofessional Rounds/Family Conf  Outcome: Ongoing (interventions implemented as appropriate)     Problem: Pancreatitis, Acute/Chronic (Adult)  Goal: Signs and Symptoms of Listed Potential Problems Will be Absent, Minimized or Managed (Pancreatitis, Acute/Chronic)  Outcome: Ongoing (interventions implemented as appropriate)     Problem: Arrhythmia/Dysrhythmia (Symptomatic) (Adult)  Goal: Signs and Symptoms of Listed Potential Problems Will be Absent, Minimized or Managed (Arrhythmia/Dysrhythmia)  Outcome: Ongoing (interventions implemented as appropriate)

## 2020-03-12 NOTE — PROGRESS NOTES
Continued Stay Note  [facility]     Patient Name: Cesar Vann  MRN: [id number]  Today's Date: 3/12/2020    Admit Date: 3/7/2020    Discharge Plan     Row Name 03/12/20 1436       Plan    Plan  Plan home.   OLIVIER Sanchez RN    Patient/Family in Agreement with Plan  yes    Plan Comments  Pt's [place] has been added to his account.  Pt denies any needs.  Plan home.   OLIVIER Sanchez RN        Discharge Codes    No documentation.             Danita Sanchez RN

## 2020-03-13 LAB
ALBUMIN SERPL-MCNC: 2.6 G/DL (ref 3.5–5.2)
ALBUMIN/GLOB SERPL: 0.9 G/DL
ALP SERPL-CCNC: 56 U/L (ref 39–117)
ALT SERPL W P-5'-P-CCNC: 28 U/L (ref 1–41)
ANION GAP SERPL CALCULATED.3IONS-SCNC: 12.1 MMOL/L (ref 5–15)
AST SERPL-CCNC: 26 U/L (ref 1–40)
BACTERIA SPEC AEROBE CULT: NORMAL
BACTERIA SPEC AEROBE CULT: NORMAL
BASOPHILS # BLD AUTO: 0.05 10*3/MM3 (ref 0–0.2)
BASOPHILS NFR BLD AUTO: 0.5 % (ref 0–1.5)
BILIRUB SERPL-MCNC: 0.5 MG/DL (ref 0.2–1.2)
BUN BLD-MCNC: 20 MG/DL (ref 6–20)
BUN/CREAT SERPL: 24.4 (ref 7–25)
CALCIUM SPEC-SCNC: 7.7 MG/DL (ref 8.6–10.5)
CANCER AG19-9 SERPL-ACNC: 43.3 U/ML
CHLORIDE SERPL-SCNC: 102 MMOL/L (ref 98–107)
CO2 SERPL-SCNC: 24.9 MMOL/L (ref 22–29)
CREAT BLD-MCNC: 0.82 MG/DL (ref 0.76–1.27)
DEPRECATED RDW RBC AUTO: 42.1 FL (ref 37–54)
EOSINOPHIL # BLD AUTO: 0.22 10*3/MM3 (ref 0–0.4)
EOSINOPHIL NFR BLD AUTO: 2.2 % (ref 0.3–6.2)
ERYTHROCYTE [DISTWIDTH] IN BLOOD BY AUTOMATED COUNT: 12.7 % (ref 12.3–15.4)
GFR SERPL CREATININE-BSD FRML MDRD: 98 ML/MIN/1.73
GLOBULIN UR ELPH-MCNC: 2.8 GM/DL
GLUCOSE BLD-MCNC: 140 MG/DL (ref 65–99)
GLUCOSE BLDC GLUCOMTR-MCNC: 122 MG/DL (ref 70–130)
GLUCOSE BLDC GLUCOMTR-MCNC: 130 MG/DL (ref 70–130)
GLUCOSE BLDC GLUCOMTR-MCNC: 139 MG/DL (ref 70–130)
GLUCOSE BLDC GLUCOMTR-MCNC: 157 MG/DL (ref 70–130)
HCT VFR BLD AUTO: 36.3 % (ref 37.5–51)
HGB BLD-MCNC: 12.2 G/DL (ref 13–17.7)
LYMPHOCYTES # BLD AUTO: 0.99 10*3/MM3 (ref 0.7–3.1)
LYMPHOCYTES NFR BLD AUTO: 9.7 % (ref 19.6–45.3)
MCH RBC QN AUTO: 30.2 PG (ref 26.6–33)
MCHC RBC AUTO-ENTMCNC: 33.6 G/DL (ref 31.5–35.7)
MCV RBC AUTO: 89.9 FL (ref 79–97)
MONOCYTES # BLD AUTO: 1.17 10*3/MM3 (ref 0.1–0.9)
MONOCYTES NFR BLD AUTO: 11.5 % (ref 5–12)
NEUTROPHILS # BLD AUTO: 7.57 10*3/MM3 (ref 1.7–7)
NEUTROPHILS NFR BLD AUTO: 74 % (ref 42.7–76)
PLATELET # BLD AUTO: 183 10*3/MM3 (ref 140–450)
PMV BLD AUTO: 9 FL (ref 6–12)
POTASSIUM BLD-SCNC: 3.6 MMOL/L (ref 3.5–5.2)
PROT SERPL-MCNC: 5.4 G/DL (ref 6–8.5)
RBC # BLD AUTO: 4.04 10*6/MM3 (ref 4.14–5.8)
SODIUM BLD-SCNC: 139 MMOL/L (ref 136–145)
WBC NRBC COR # BLD: 10.21 10*3/MM3 (ref 3.4–10.8)

## 2020-03-13 PROCEDURE — 80053 COMPREHEN METABOLIC PANEL: CPT | Performed by: INTERNAL MEDICINE

## 2020-03-13 PROCEDURE — 82962 GLUCOSE BLOOD TEST: CPT

## 2020-03-13 PROCEDURE — 99232 SBSQ HOSP IP/OBS MODERATE 35: CPT | Performed by: INTERNAL MEDICINE

## 2020-03-13 PROCEDURE — 25010000002 ENOXAPARIN PER 10 MG: Performed by: HOSPITALIST

## 2020-03-13 PROCEDURE — 86301 IMMUNOASSAY TUMOR CA 19-9: CPT | Performed by: INTERNAL MEDICINE

## 2020-03-13 PROCEDURE — 25010000002 KETOROLAC TROMETHAMINE PER 15 MG: Performed by: HOSPITALIST

## 2020-03-13 PROCEDURE — 25010000002 HYDROMORPHONE 1 MG/ML SOLUTION: Performed by: HOSPITALIST

## 2020-03-13 PROCEDURE — 85025 COMPLETE CBC W/AUTO DIFF WBC: CPT | Performed by: INTERNAL MEDICINE

## 2020-03-13 PROCEDURE — 25010000002 HYDRALAZINE PER 20 MG: Performed by: INTERNAL MEDICINE

## 2020-03-13 PROCEDURE — 99232 SBSQ HOSP IP/OBS MODERATE 35: CPT | Performed by: NURSE PRACTITIONER

## 2020-03-13 PROCEDURE — 25010000002 AMIODARONE IN DEXTROSE 5% 360-4.14 MG/200ML-% SOLUTION: Performed by: NURSE PRACTITIONER

## 2020-03-13 RX ORDER — AMLODIPINE BESYLATE 5 MG/1
5 TABLET ORAL ONCE
Status: COMPLETED | OUTPATIENT
Start: 2020-03-13 | End: 2020-03-13

## 2020-03-13 RX ORDER — LIDOCAINE 50 MG/G
1 PATCH TOPICAL EVERY 12 HOURS SCHEDULED
Status: DISCONTINUED | OUTPATIENT
Start: 2020-03-14 | End: 2020-03-19 | Stop reason: HOSPADM

## 2020-03-13 RX ADMIN — POTASSIUM CHLORIDE 40 MEQ: 10 CAPSULE, COATED, EXTENDED RELEASE ORAL at 09:55

## 2020-03-13 RX ADMIN — KETOROLAC TROMETHAMINE 30 MG: 30 INJECTION, SOLUTION INTRAMUSCULAR at 03:34

## 2020-03-13 RX ADMIN — SENNOSIDES AND DOCUSATE SODIUM 2 TABLET: 8.6; 5 TABLET ORAL at 09:55

## 2020-03-13 RX ADMIN — HYDROMORPHONE HYDROCHLORIDE 1 MG: 1 INJECTION, SOLUTION INTRAMUSCULAR; INTRAVENOUS; SUBCUTANEOUS at 05:31

## 2020-03-13 RX ADMIN — ENOXAPARIN SODIUM 40 MG: 40 INJECTION SUBCUTANEOUS at 14:15

## 2020-03-13 RX ADMIN — HYDROMORPHONE HYDROCHLORIDE 1 MG: 1 INJECTION, SOLUTION INTRAMUSCULAR; INTRAVENOUS; SUBCUTANEOUS at 13:00

## 2020-03-13 RX ADMIN — AMIODARONE HYDROCHLORIDE 0.5 MG/MIN: 1.8 INJECTION, SOLUTION INTRAVENOUS at 03:19

## 2020-03-13 RX ADMIN — AMLODIPINE BESYLATE 5 MG: 5 TABLET ORAL at 22:55

## 2020-03-13 RX ADMIN — SODIUM CHLORIDE, POTASSIUM CHLORIDE, SODIUM LACTATE AND CALCIUM CHLORIDE 75 ML/HR: 600; 310; 30; 20 INJECTION, SOLUTION INTRAVENOUS at 03:19

## 2020-03-13 RX ADMIN — HYDROMORPHONE HYDROCHLORIDE 0.5 MG: 1 INJECTION, SOLUTION INTRAMUSCULAR; INTRAVENOUS; SUBCUTANEOUS at 23:00

## 2020-03-13 RX ADMIN — CYCLOBENZAPRINE 10 MG: 10 TABLET, FILM COATED ORAL at 10:13

## 2020-03-13 RX ADMIN — HYDROMORPHONE HYDROCHLORIDE 0.5 MG: 1 INJECTION, SOLUTION INTRAMUSCULAR; INTRAVENOUS; SUBCUTANEOUS at 23:15

## 2020-03-13 RX ADMIN — METOPROLOL SUCCINATE 100 MG: 100 TABLET, FILM COATED, EXTENDED RELEASE ORAL at 09:55

## 2020-03-13 RX ADMIN — LIDOCAINE 1 PATCH: 50 PATCH CUTANEOUS at 09:56

## 2020-03-13 RX ADMIN — METOPROLOL SUCCINATE 100 MG: 100 TABLET, FILM COATED, EXTENDED RELEASE ORAL at 20:32

## 2020-03-13 RX ADMIN — HYDROMORPHONE HYDROCHLORIDE 1 MG: 1 INJECTION, SOLUTION INTRAMUSCULAR; INTRAVENOUS; SUBCUTANEOUS at 20:45

## 2020-03-13 RX ADMIN — HYDRALAZINE HYDROCHLORIDE 10 MG: 20 INJECTION INTRAMUSCULAR; INTRAVENOUS at 17:52

## 2020-03-13 RX ADMIN — SODIUM CHLORIDE, POTASSIUM CHLORIDE, SODIUM LACTATE AND CALCIUM CHLORIDE 75 ML/HR: 600; 310; 30; 20 INJECTION, SOLUTION INTRAVENOUS at 17:48

## 2020-03-13 RX ADMIN — SODIUM CHLORIDE, PRESERVATIVE FREE 10 ML: 5 INJECTION INTRAVENOUS at 20:32

## 2020-03-13 RX ADMIN — AMIODARONE HYDROCHLORIDE 0.5 MG/MIN: 1.8 INJECTION, SOLUTION INTRAVENOUS at 10:36

## 2020-03-13 RX ADMIN — AMIODARONE HYDROCHLORIDE 0.5 MG/MIN: 1.8 INJECTION, SOLUTION INTRAVENOUS at 17:48

## 2020-03-13 RX ADMIN — POLYETHYLENE GLYCOL 3350 34 G: 17 POWDER, FOR SOLUTION ORAL at 09:56

## 2020-03-13 RX ADMIN — KETOROLAC TROMETHAMINE 30 MG: 30 INJECTION, SOLUTION INTRAMUSCULAR at 10:13

## 2020-03-13 RX ADMIN — HYDROMORPHONE HYDROCHLORIDE 1 MG: 1 INJECTION, SOLUTION INTRAMUSCULAR; INTRAVENOUS; SUBCUTANEOUS at 16:22

## 2020-03-13 RX ADMIN — SENNOSIDES AND DOCUSATE SODIUM 2 TABLET: 8.6; 5 TABLET ORAL at 20:32

## 2020-03-13 NOTE — PROGRESS NOTES
"CC: atrial fib    Interval History: He converted with IV amiodarone which was restarted yesterday when he had recurrent RVR.  Reports that his abdomen feels less bloated.  He is not having a severe pain with gut rest.  Only has shortness of breath with severe abdominal discomfort.      Vital Signs  Temp:  [97.6 °F (36.4 °C)-99.5 °F (37.5 °C)] 97.6 °F (36.4 °C)  Heart Rate:  [] 73  Resp:  [15-18] 18  BP: (132-163)/(76-97) 158/91  No intake or output data in the 24 hours ending 03/13/20 0902  Flowsheet Rows      First Filed Value   Admission Height  180.3 cm (71\") Documented at 03/07/2020 0210   Admission Weight  122 kg (268 lb 8 oz) Documented at 03/07/2020 0226          PHYSICAL EXAM:  General: No acute distress  Resp:NL Rate, unlabored, clear  CV:NL rate and rhythm, NL PMI, Nl S1 and S2, no Murmur, no gallop, no rub, No JVD. Normal pedal pulses  ABD:Nl sounds, no masses or tenderness, nondistended, no guarding or rebound  Neuro: alert,cooperative and oriented  Extr: Trace edema, no cyanosis, moves all extremities      Results Review:    Results from last 7 days   Lab Units 03/13/20  0435   SODIUM mmol/L 139   POTASSIUM mmol/L 3.6   CHLORIDE mmol/L 102   CO2 mmol/L 24.9   BUN mg/dL 20   CREATININE mg/dL 0.82   GLUCOSE mg/dL 140*   CALCIUM mg/dL 7.7*     Results from last 7 days   Lab Units 03/07/20  0401   TROPONIN T ng/mL <0.010     Results from last 7 days   Lab Units 03/13/20  0435   WBC 10*3/mm3 10.21   HEMOGLOBIN g/dL 12.2*   HEMATOCRIT % 36.3*   PLATELETS 10*3/mm3 183     Results from last 7 days   Lab Units 03/07/20  0401   INR  1.08         Results from last 7 days   Lab Units 03/12/20  0359   MAGNESIUM mg/dL 2.5     Results from last 7 days   Lab Units 03/07/20  0802   TRIGLYCERIDES mg/dL 80     I reviewed the patient's new clinical results.  I personally viewed and interpreted the patient's EKG/Telemetry data        Medication Review:   Meds reviewed      amiodarone 0.5 mg/min Last Rate: 0.5 mg/min " (03/13/20 0319)   lactated ringers 75 mL/hr Last Rate: 75 mL/hr (03/13/20 0319)       Assessment/Plan    1.  54-year-old gentleman with paroxysmal atrial fibrillation, normal left ventricular systolic function on echo August 2017 now with rapid ventricular response in setting of acute pancreatitis.   He stopped taking warfarin a while back but is agreeable to get back on anticoagulation prior to discharge. [id number] score is 1.  Considering pancreatitis and potential need for intervention I think it is appropriate to hold at this point in time.  2.  Acute pancreatitis GI following gut rest IV fluids.  MRI unremarkable.  CT shows progression  3.  Hypertension follow up pressure occasionally elevated-tolerating increased beta-blocker  4.  Obstructive sleep apnea has home pap here  5.   Normal perfusion stress test August 2017  6.    Constipation per GI -had some loose stool overnight again  7.Need for healthcare maintenance provider. States he does not have a PCP    Maintaining normal sinus rhythm on maintenance IV amiodarone.  Gut rest per GI in the setting of pancreatitis.  Appears he may be here over the weekend.  JUICE Case  03/13/20  09:02

## 2020-03-13 NOTE — PROGRESS NOTES
" LOS: 6 days     Name: Cesar Vann  Age: 54 y.o.  Sex: male  :  1965  MRN: [id number]         Primary Care Physician: Bing Eduardo APRN    Subjective   Subjective  Abdominal pain is a little bit better today.  No new complaints.  Still with a little bit of swelling in his legs but this is unchanged from yesterday.  Denies any shortness of breath.  No fevers or chills.  Heart rate better controlled and he denies chest pain.    Objective   Vital Signs  Temp:  [97.6 °F (36.4 °C)-99.5 °F (37.5 °C)] 97.6 °F (36.4 °C)  Heart Rate:  [] 75  Resp:  [15-18] 18  BP: (132-179)/(76-97) 179/79  Body mass index is 37.45 kg/m².    Objective:  General Appearance:  Comfortable and in no acute distress.    Vital signs: (most recent): Blood pressure 179/79, pulse 75, temperature 97.6 °F (36.4 °C), temperature source Oral, resp. rate 18, height 180.3 cm (71\"), weight 122 kg (268 lb 8 oz), SpO2 93 %.    Lungs:  Normal effort and normal respiratory rate.    Heart: Normal rate.  Regular rhythm.    Abdomen: Abdomen is soft.  Bowel sounds are normal.   There is no abdominal tenderness.     Extremities: There is dependent edema.  There is no local swelling.    Neurological: Patient is alert and oriented to person, place and time.    Skin:  Warm and dry.            Results Review:       I reviewed the patient's new clinical results.    Results from last 7 days   Lab Units 20  0435 20  0556 03/10/20  0558 20  0427 20  0600 20  0802 20  0401   WBC 10*3/mm3 10.21 12.83* 12.33* 23.96* 23.74* 13.97* 15.09*   HEMOGLOBIN g/dL 12.2* 13.6 13.2 16.7 16.0 15.3 16.5   PLATELETS 10*3/mm3 183 139* 154 180 164 171 163     Results from last 7 days   Lab Units 20  0435 20  0359 20  0816 03/10/20  1625 03/10/20  0558 20  2312 20  0427 20  0600 20  0802 20  0401   SODIUM mmol/L 139  --  137  --  139  --  140 135* 141 140   POTASSIUM mmol/L 3.6 3.1* " 3.9 3.9 3.3* 3.9 3.4* 3.4* 4.7 4.7   CHLORIDE mmol/L 102  --  99  --  103  --  101 99 105 103   CO2 mmol/L 24.9  --  27.6  --  27.0  --  23.0 23.0 23.7 21.2*   BUN mg/dL 20  --  19  --  26*  --  24* 19 18 18   CREATININE mg/dL 0.82  --  0.78  --  0.89  --  1.01 0.84 1.06 1.04   CALCIUM mg/dL 7.7*  --  7.8*  --  7.8*  --  8.4* 8.2* 8.5* 9.3   GLUCOSE mg/dL 140*  --  143*  --  139*  --  147* 203* 263* 346*     Results from last 7 days   Lab Units 03/07/20  0401   INR  1.08       Scheduled Meds:     enoxaparin 40 mg Subcutaneous Q24H   insulin lispro 0-7 Units Subcutaneous 4x Daily With Meals & Nightly   lidocaine 1 patch Transdermal Q12H   metoprolol succinate  mg Oral BID   polyethylene glycol 34 g Oral BID   potassium chloride 40 mEq Oral Daily   senna-docusate sodium 2 tablet Oral BID   sodium chloride 10 mL Intravenous Q12H     PRN Meds:   •  acetaminophen **OR** acetaminophen **OR** acetaminophen  •  bisacodyl  •  bisacodyl  •  calcium carbonate  •  cyclobenzaprine  •  dextrose  •  dextrose  •  glucagon (human recombinant)  •  hydrALAZINE  •  HYDROmorphone  •  ketorolac  •  metoprolol tartrate  •  nitroglycerin  •  ondansetron **OR** ondansetron  •  oxyCODONE-acetaminophen  •  polyethylene glycol  •  potassium chloride **OR** potassium chloride **OR** potassium chloride  •  promethazine  •  sodium chloride  •  sodium chloride  Continuous Infusions:    amiodarone 0.5 mg/min Last Rate: 0.5 mg/min (03/13/20 1036)   lactated ringers 75 mL/hr Last Rate: 75 mL/hr (03/13/20 0319)       Assessment/Plan   Active Hospital Problems    Diagnosis  POA   • **Acute pancreatitis [K85.90]  Yes   • Acute constipation [K59.00]  Unknown   • Atrial fibrillation (CMS/HCC) [I48.91]  Unknown   • Back pain [M54.9]  Unknown   • Morbid obesity (CMS/HCC) [E66.01]  Unknown   • Essential hypertension [I10]  Unknown   • Hyperglycemia [R73.9]  Unknown   • Sleep apnea [G47.30]  Unknown   • Hypercholesteremia [E78.00]  Unknown   • Asthma  [H45.714]  Unknown      Resolved Hospital Problems   No resolved problems to display.       Assessment & Plan    54 y.o. male admitted with Acute pancreatitis.     · Pancreatitis- etiology uncertain.  Follow-up IgG levels  § Repeat CT of the abdomen pelvis 3/11 showed worsening inflammation associated with his pancreatitis but no pseudocyst or abscess formation  § Continue gut rest along with IV fluids, analgesics  § If cannot advance diet soon then we will need to consider alternate forms of nutrition  · Constipation related to above plus opiate use  § Continue current bowel regimen  · A. fib-  went back into rapid A. fib last night  § Now back on amiodarone drip with better control of heart rate  § Cardiology following  § No anticoagulation for now beyond prophylactic Lovenox until we see what happens with his acute pancreatitis  § Replace potassium  · Morbid obesity    Tate Mckoy MD  [facility]  03/13/20  12:11 PM

## 2020-03-13 NOTE — PROGRESS NOTES
[facility]  Inpatient Progress Note    Reason for Follow Up:  pancreatitis    Subjective     Interval History:   Less abdominal pain but still not hungry. Is trying to move bowels.     Current Facility-Administered Medications:   •  acetaminophen (TYLENOL) tablet 650 mg, 650 mg, Oral, Q4H PRN, 650 mg at 03/11/20 1359 **OR** acetaminophen (TYLENOL) 160 MG/5ML solution 650 mg, 650 mg, Oral, Q4H PRN **OR** acetaminophen (TYLENOL) suppository 650 mg, 650 mg, Rectal, Q4H PRN, Dedra Ruff APRN  •  amiodarone (NEXTERONE) 360 mg/200 mL (1.8 mg/mL) infusion, 0.5 mg/min, Intravenous, Continuous, Gabby Teresa APRN, Last Rate: 16.67 mL/hr at 03/13/20 1036, 0.5 mg/min at 03/13/20 1036  •  bisacodyl (DULCOLAX) EC tablet 5 mg, 5 mg, Oral, Daily PRN, Dedra Ruff APRN, 5 mg at 03/09/20 2358  •  bisacodyl (DULCOLAX) suppository 10 mg, 10 mg, Rectal, Daily PRN, Tracey Davis MD  •  calcium carbonate (TUMS) chewable tablet 500 mg (200 mg elemental), 2 tablet, Oral, BID PRN, Dedra Ruff APRN, 2 tablet at 03/10/20 1133  •  cyclobenzaprine (FLEXERIL) tablet 10 mg, 10 mg, Oral, TID PRN, Kei Arnold MD, 10 mg at 03/13/20 1013  •  dextrose (D50W) 25 g/ 50mL Intravenous Solution 25 g, 25 g, Intravenous, Q15 Min PRN, Kei Arnold MD  •  dextrose (GLUTOSE) oral gel 15 g, 15 g, Oral, Q15 Min PRN, Kei Arnold MD  •  enoxaparin (LOVENOX) syringe 40 mg, 40 mg, Subcutaneous, Q24H, Kei Arnold MD, 40 mg at 03/12/20 1721  •  glucagon (human recombinant) (GLUCAGEN DIAGNOSTIC) injection 1 mg, 1 mg, Subcutaneous, Q15 Min PRN, Kei Arnold MD  •  hydrALAZINE (APRESOLINE) injection 10 mg, 10 mg, Intravenous, Q6H PRN, Stepan Pacheco MD, 10 mg at 03/12/20 0240  •  HYDROmorphone (DILAUDID) injection 1 mg, 1 mg, Intravenous, Q2H PRN, Norberto Simon MD, 1 mg at 03/13/20 0531  •  insulin lispro (humaLOG) injection 0-7 Units, 0-7 Units, Subcutaneous, 4x Daily With Meals &  Nightly, Kei Arnold MD, 2 Units at 03/12/20 2108  •  ketorolac (TORADOL) injection 30 mg, 30 mg, Intravenous, Q6H PRN, Norberto Simon MD, 30 mg at 03/13/20 1013  •  lactated ringers infusion, 75 mL/hr, Intravenous, Continuous, Norberto Simon MD, Last Rate: 75 mL/hr at 03/13/20 0319, 75 mL/hr at 03/13/20 0319  •  lidocaine (LIDODERM) 5 % 1 patch, 1 patch, Transdermal, Q12H, Norberto Simon MD, 1 patch at 03/13/20 0956  •  metoprolol succinate XL (TOPROL-XL) 24 hr tablet 100 mg, 100 mg, Oral, BID, MalickEstephanie cortessa, APRN, 100 mg at 03/13/20 0955  •  metoprolol tartrate (LOPRESSOR) injection 5 mg, 5 mg, Intravenous, Q5 Min PRN, Gabby Teresa, APRN  •  nitroglycerin (NITROSTAT) SL tablet 0.4 mg, 0.4 mg, Sublingual, Q5 Min PRN, Dedra Ruff APRN  •  ondansetron (ZOFRAN) tablet 4 mg, 4 mg, Oral, Q6H PRN **OR** ondansetron (ZOFRAN) injection 4 mg, 4 mg, Intravenous, Q6H PRN, Dedra Ruff APRN, 4 mg at 03/11/20 0249  •  oxyCODONE-acetaminophen (PERCOCET) 7.5-325 MG per tablet 1 tablet, 1 tablet, Oral, Q4H PRN, Norberto Simon MD, 1 tablet at 03/12/20 0022  •  polyethylene glycol (MIRALAX) packet 17 g, 17 g, Oral, Daily PRN, Norberto Simon MD, 17 g at 03/10/20 1521  •  polyethylene glycol (MIRALAX) packet 34 g, 34 g, Oral, BID, Tracey Davis MD, 34 g at 03/13/20 0956  •  potassium chloride (MICRO-K) CR capsule 40 mEq, 40 mEq, Oral, PRN, 40 mEq at 03/12/20 1332 **OR** potassium chloride (KLOR-CON) packet 40 mEq, 40 mEq, Oral, PRN **OR** potassium chloride 10 mEq in 100 mL IVPB, 10 mEq, Intravenous, Q1H PRN, Norberto Simon MD  •  potassium chloride (MICRO-K) CR capsule 40 mEq, 40 mEq, Oral, Daily, Gabby Teresa, APRN, 40 mEq at 03/13/20 0955  •  promethazine (PHENERGAN) injection 12.5 mg, 12.5 mg, Intravenous, Q6H PRN, Kei Arnold MD, 12.5 mg at 03/08/20 1423  •  senna-docusate sodium (SENOKOT-S) 8.6-50 MG tablet 2 tablet, 2 tablet,  Oral, BID, Tate Mckoy MD, 2 tablet at 03/13/20 0955  •  sodium chloride 0.9 % flush 10 mL, 10 mL, Intravenous, PRN, Charmaine Vogel MD  •  sodium chloride 0.9 % flush 10 mL, 10 mL, Intravenous, Q12H, Dedra Ruff APRN, 10 mL at 03/11/20 2031  •  sodium chloride 0.9 % flush 10 mL, 10 mL, Intravenous, PRN, Dedra Ruff APRN  Review of Systems:    The following systems were reviewed and negative;  constitution, ENT, respiratory, genitourinary, breast, hematologic / lymphatic, musculoskeletal and neurological    Objective     Vital Signs  Temp:  [97.6 °F (36.4 °C)-99.5 °F (37.5 °C)] 97.6 °F (36.4 °C)  Heart Rate:  [] 75  Resp:  [15-18] 18  BP: (132-179)/(76-97) 179/79  Body mass index is 37.45 kg/m².    Intake/Output Summary (Last 24 hours) at 3/13/2020 1226  Last data filed at 3/13/2020 1143  Gross per 24 hour   Intake 0 ml   Output --   Net 0 ml     No intake/output data recorded.     Physical Exam:   General: patient awake, alert and cooperative, obese   Eyes: Normal lids and lashes, no scleral icterus   Neck: supple, normal ROM   Skin: warm and dry, not jaundiced   Cardiovascular: regular rhythm and rate, no murmurs auscultated   Pulm: clear to auscultation bilaterally, regular and unlabored   Abdomen: obese, +distended; decreased bowel sounds   Extremities: no rash, 1-2+ pitting pretibial edema.   Psychiatric: Normal mood and behavior; memory intact     Results Review:     I reviewed the patient's new clinical results.    Results from last 7 days   Lab Units 03/13/20  0435 03/11/20  0556 03/10/20  0558   WBC 10*3/mm3 10.21 12.83* 12.33*   HEMOGLOBIN g/dL 12.2* 13.6 13.2   HEMATOCRIT % 36.3* 37.6 37.9   PLATELETS 10*3/mm3 183 139* 154     Results from last 7 days   Lab Units 03/13/20  0435 03/12/20  0359 03/11/20  0816  03/10/20  0558  03/08/20  0600   SODIUM mmol/L 139  --  137  --  139   < > 135*   POTASSIUM mmol/L 3.6 3.1* 3.9   < > 3.3*   < > 3.4*   CHLORIDE mmol/L 102  --   99  --  103   < > 99   CO2 mmol/L 24.9  --  27.6  --  27.0   < > 23.0   BUN mg/dL 20  --  19  --  26*   < > 19   CREATININE mg/dL 0.82  --  0.78  --  0.89   < > 0.84   CALCIUM mg/dL 7.7*  --  7.8*  --  7.8*   < > 8.2*   BILIRUBIN mg/dL 0.5  --  0.6  --   --   --  0.9   ALK PHOS U/L 56  --  60  --   --   --  51   ALT (SGPT) U/L 28  --  26  --   --   --  39   AST (SGOT) U/L 26  --  31  --   --   --  26   GLUCOSE mg/dL 140*  --  143*  --  139*   < > 203*    < > = values in this interval not displayed.     Results from last 7 days   Lab Units 03/07/20  0401   INR  1.08     Lab Results   Lab Value Date/Time    LIPASE 39 03/10/2020 0558    LIPASE 170 (H) 03/09/2020 0427    LIPASE 561 (H) 03/08/2020 0600    LIPASE >3,000 (H) 03/07/2020 0401       Radiology:  CT Abdomen Pelvis With Contrast   Final Result   1. Findings consistent with moderately severe inflammatory change   surrounding the pancreas consistent with pancreatitis. This finding has   progressed since the previous study of 03/07/2020.   2. No pancreatic masses or pseudocysts are seen.   3. Small amount of free fluid in the abdomen and pelvis.   4. Status post cholecystectomy.   5. Small bilateral pleural effusions and bibasilar atelectasis.   6. Several small mesenteric nodes. These could be reassessed on a   follow-up study.               Radiation dose reduction techniques were utilized, including automated   exposure control and exposure modulation based on body size.       This report was finalized on 3/12/2020 8:36 AM by Dr. Kilo Owens M.D.          XR Chest PA & Lateral   Final Result   Small likely atelectasis or scarring.       This report was finalized on 3/9/2020 2:16 PM by Dr. Roger Howe M.D.          MRI abdomen w wo contrast mrcp   Final Result   Markedly limited exam as the patient could not cooperate   with breath-hold procedure. Both pre and postcontrast images were   markedly limited.   MR findings of acute pancreatitis.  Status post cholecystectomy. No   evidence of choledocholithiasis. No dilatation of the pancreatic duct.   Additional details as above.       This report was finalized on 3/9/2020 12:47 PM by Dr. Bernadine Atwood M.D.          CT Abdomen Pelvis With Contrast   Final Result       1. Inflammatory stranding is seen surrounding the pancreas, suspicious   for pancreatitis. While there is some fluid identified within the upper   abdomen, no organized peripancreatic collections are seen. The pancreas   enhances normally.   2. Area of decreased attenuation involving the inferior aspect of the   spleen. Small splenic infarction is not excluded.       Radiation dose reduction techniques were utilized, including automated   exposure control and exposure modulation based on body size.       This report was finalized on 3/7/2020 5:42 AM by Dr. Brianna Soliz M.D.              Assessment/Plan     Patient Active Problem List   Diagnosis   • Acute pancreatitis   • Essential hypertension   • Hyperglycemia   • Sleep apnea   • Hypercholesteremia   • Asthma   • Atrial fibrillation (CMS/HCC)   • Back pain   • Morbid obesity (CMS/HCC)   • Acute constipation       Assessment/Recommendations:    1. Acute pancreatitis: no choledocholithiasis as per imaging; CT abd 3/11 shows worsening pancreatitis. Continue gut rest for now. Tomorrow consider retrying clear liquid diet?     2. Upper abdominal pain: Persist with above.       3.  Abdominal distention: Persists     4.  Constipation: Small results with suppositories The patient is not impacted.      5.  Obesity -     I discussed the patients findings and my recommendations with patient.    Devan Williamson MD

## 2020-03-13 NOTE — CONSULTS
"Diabetes Education  Assessment/Teaching    Patient Name:  Cesar Vann  YOB: 1965  MRN: [id number]  Admit Date:  3/7/2020      Assessment Date:  3/13/2020    Most Recent Value   General Information    Referral From:  MD ford   Height  180.3 cm (71\")   Height Method  Stated   Weight  122 kg (268 lb 8 oz)   How would you rate your current health?  poor   Pregnancy Assessment   Diabetes History   What type of diabetes do you have?  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  no   Do you test your blood sugar at home?  no   Have you had low blood sugar? (<70mg/dl)  no   Have you had high blood sugar? (>140mg/dl)  no   How would you rate your diabetes control?  poor   Have You Felt Down, Depressed or Hopeless?  -- [Pt states he has a hx of depression]   Have You Felt Little Interest or Pleasure in Doing Things?  yes   What makes it difficult for you to take care of your diabetes or yourself?  -- [has not been taking care of my health lately]   Education Preferences   What areas of diabetes would you like to learn about?  avoiding high blood sugar, diet information, testing my blood sugar at home   Barriers to Learning  other (comment) [None noted]   Nutrition Information   Assessment Topics   Healthy Eating - Assessment  Needs education   Being Active - Assessment  Needs education   Taking Medication - Assessment  Needs education   Problem Solving - Assessment  Needs education   Reducing Risk - Assessment  Needs education   Healthy Coping - Assessment  Needs education   Monitoring - Assessment  Needs education   DM Goals   Healthy Eating - Goal  0-30 days from discharge   Being Active - Goal  0-30 days from discharge   Taking Medication - Goal  0-7 days from discharge   Problem Solving - Goal  0-30 days from discharge   Reducing Risk - Goal  30-90 days from " discharge   Monitoring - Goal  0-7 days from discharge   Contact Plan  Follow-up medical care            Most Recent Value   DM Education Needs   Meter  Meter provided   Meter Type  One Touch [Verio Flex w/ Delica lancets.  Pt states is familiar w/ BGM.]   Medication  -- [Pt states is familiar w/ insulin administration both methods ]   Reducing Risks  A1C testing [a1c 8.8%]   Healthy Coping  Appropriate   Discharge Plan  Home, Follow-up with PCP   Motivation  Moderate   Teaching Method  Explanation, Discussion, Handouts   Patient Response  Verbalized understanding          Electronically signed by:  Nora Wan RN  03/13/20 10:01

## 2020-03-13 NOTE — PLAN OF CARE
Patient   up in  the   chair  at  this time.  Continue  Amiodarone  drip  and tolerating  well.  Just  converted  to   SR .  Medicated  for  pain  couple  of  times.   IV  fluid  infusing.  No complaint  of  nausea  at this time.  Ambulated  around  the  unit  x 2.  Nursing  will  continue to monitor.  Problem: Pain, Chronic (Adult)  Goal: Acceptable Pain/Comfort Level and Functional Ability  Outcome: Ongoing (interventions implemented as appropriate)  Flowsheets (Taken 3/13/2020 0351)  Acceptable Pain/Comfort Level and Functional Ability: making progress toward outcome     Problem: Patient Care Overview  Goal: Plan of Care Review  Outcome: Ongoing (interventions implemented as appropriate)  Flowsheets (Taken 3/13/2020 0351)  Progress: no change  Goal: Discharge Needs Assessment  Outcome: Ongoing (interventions implemented as appropriate)  Flowsheets (Taken 3/13/2020 0351)  Concerns to be Addressed: denies needs/concerns at this time; no discharge needs identified  Patient/Family Anticipates Transition to: home  Goal: Interprofessional Rounds/Family Conf  Outcome: Ongoing (interventions implemented as appropriate)  Flowsheets (Taken 3/13/2020 0351)  Participants: nursing; patient     Problem: Pancreatitis, Acute/Chronic (Adult)  Goal: Signs and Symptoms of Listed Potential Problems Will be Absent, Minimized or Managed (Pancreatitis, Acute/Chronic)  Outcome: Ongoing (interventions implemented as appropriate)  Flowsheets (Taken 3/13/2020 0351)  Problems Assessed (Pancreatitis): all  Problems Present (Pancreatitis): pain     Problem: Arrhythmia/Dysrhythmia (Symptomatic) (Adult)  Goal: Signs and Symptoms of Listed Potential Problems Will be Absent, Minimized or Managed (Arrhythmia/Dysrhythmia)  Outcome: Ongoing (interventions implemented as appropriate)  Flowsheets (Taken 3/13/2020 0351)  Problems Assessed (Arrhythmia/Dysrhythmia): all  Problems Present (Dysrhythmia): none

## 2020-03-13 NOTE — PLAN OF CARE
Pt c/o pain frequently. Given pain meds PRN when indicated. Some swelling in BLE. BLE elevated and kerlex and ace wraps applied to legs. VSS. Cont on amio gtt. Will cont to monitor pt.      Problem: Pain, Chronic (Adult)  Goal: Acceptable Pain/Comfort Level and Functional Ability  Outcome: Ongoing (interventions implemented as appropriate)     Problem: Patient Care Overview  Goal: Plan of Care Review  Outcome: Ongoing (interventions implemented as appropriate)  Goal: Individualization and Mutuality  Outcome: Ongoing (interventions implemented as appropriate)  Goal: Discharge Needs Assessment  Outcome: Ongoing (interventions implemented as appropriate)  Goal: Interprofessional Rounds/Family Conf  Outcome: Ongoing (interventions implemented as appropriate)     Problem: Pancreatitis, Acute/Chronic (Adult)  Goal: Signs and Symptoms of Listed Potential Problems Will be Absent, Minimized or Managed (Pancreatitis, Acute/Chronic)  Outcome: Ongoing (interventions implemented as appropriate)     Problem: Arrhythmia/Dysrhythmia (Symptomatic) (Adult)  Goal: Signs and Symptoms of Listed Potential Problems Will be Absent, Minimized or Managed (Arrhythmia/Dysrhythmia)  Outcome: Ongoing (interventions implemented as appropriate)

## 2020-03-14 LAB
ANION GAP SERPL CALCULATED.3IONS-SCNC: 12 MMOL/L (ref 5–15)
BUN BLD-MCNC: 17 MG/DL (ref 6–20)
BUN/CREAT SERPL: 22.4 (ref 7–25)
CALCIUM SPEC-SCNC: 8.3 MG/DL (ref 8.6–10.5)
CHLORIDE SERPL-SCNC: 98 MMOL/L (ref 98–107)
CO2 SERPL-SCNC: 25 MMOL/L (ref 22–29)
CREAT BLD-MCNC: 0.76 MG/DL (ref 0.76–1.27)
DEPRECATED RDW RBC AUTO: 42.8 FL (ref 37–54)
ERYTHROCYTE [DISTWIDTH] IN BLOOD BY AUTOMATED COUNT: 12.9 % (ref 12.3–15.4)
GFR SERPL CREATININE-BSD FRML MDRD: 107 ML/MIN/1.73
GLUCOSE BLD-MCNC: 134 MG/DL (ref 65–99)
GLUCOSE BLDC GLUCOMTR-MCNC: 114 MG/DL (ref 70–130)
GLUCOSE BLDC GLUCOMTR-MCNC: 124 MG/DL (ref 70–130)
GLUCOSE BLDC GLUCOMTR-MCNC: 128 MG/DL (ref 70–130)
GLUCOSE BLDC GLUCOMTR-MCNC: 135 MG/DL (ref 70–130)
HCT VFR BLD AUTO: 38.1 % (ref 37.5–51)
HGB BLD-MCNC: 12.7 G/DL (ref 13–17.7)
MCH RBC QN AUTO: 30.6 PG (ref 26.6–33)
MCHC RBC AUTO-ENTMCNC: 33.3 G/DL (ref 31.5–35.7)
MCV RBC AUTO: 91.8 FL (ref 79–97)
PLATELET # BLD AUTO: 224 10*3/MM3 (ref 140–450)
PMV BLD AUTO: 8.8 FL (ref 6–12)
POTASSIUM BLD-SCNC: 3.6 MMOL/L (ref 3.5–5.2)
RBC # BLD AUTO: 4.15 10*6/MM3 (ref 4.14–5.8)
SODIUM BLD-SCNC: 135 MMOL/L (ref 136–145)
WBC NRBC COR # BLD: 13.45 10*3/MM3 (ref 3.4–10.8)

## 2020-03-14 PROCEDURE — 80048 BASIC METABOLIC PNL TOTAL CA: CPT | Performed by: INTERNAL MEDICINE

## 2020-03-14 PROCEDURE — 99232 SBSQ HOSP IP/OBS MODERATE 35: CPT | Performed by: INTERNAL MEDICINE

## 2020-03-14 PROCEDURE — 82962 GLUCOSE BLOOD TEST: CPT

## 2020-03-14 PROCEDURE — 25010000002 HYDROMORPHONE 1 MG/ML SOLUTION: Performed by: HOSPITALIST

## 2020-03-14 PROCEDURE — 25010000002 AMIODARONE IN DEXTROSE 5% 360-4.14 MG/200ML-% SOLUTION: Performed by: NURSE PRACTITIONER

## 2020-03-14 PROCEDURE — 85027 COMPLETE CBC AUTOMATED: CPT | Performed by: INTERNAL MEDICINE

## 2020-03-14 PROCEDURE — 25010000002 ENOXAPARIN PER 10 MG: Performed by: HOSPITALIST

## 2020-03-14 RX ORDER — AMIODARONE HYDROCHLORIDE 200 MG/1
200 TABLET ORAL EVERY 12 HOURS SCHEDULED
Status: DISCONTINUED | OUTPATIENT
Start: 2020-03-14 | End: 2020-03-17

## 2020-03-14 RX ORDER — BISACODYL 10 MG
10 SUPPOSITORY, RECTAL RECTAL DAILY
Status: DISCONTINUED | OUTPATIENT
Start: 2020-03-14 | End: 2020-03-16

## 2020-03-14 RX ADMIN — SODIUM CHLORIDE, POTASSIUM CHLORIDE, SODIUM LACTATE AND CALCIUM CHLORIDE 75 ML/HR: 600; 310; 30; 20 INJECTION, SOLUTION INTRAVENOUS at 10:01

## 2020-03-14 RX ADMIN — SODIUM CHLORIDE, PRESERVATIVE FREE 10 ML: 5 INJECTION INTRAVENOUS at 12:54

## 2020-03-14 RX ADMIN — OXYCODONE HYDROCHLORIDE AND ACETAMINOPHEN 1 TABLET: 7.5; 325 TABLET ORAL at 20:07

## 2020-03-14 RX ADMIN — AMIODARONE HYDROCHLORIDE 200 MG: 200 TABLET ORAL at 20:11

## 2020-03-14 RX ADMIN — SODIUM CHLORIDE, PRESERVATIVE FREE 10 ML: 5 INJECTION INTRAVENOUS at 20:12

## 2020-03-14 RX ADMIN — AMIODARONE HYDROCHLORIDE 0.5 MG/MIN: 1.8 INJECTION, SOLUTION INTRAVENOUS at 02:23

## 2020-03-14 RX ADMIN — AMIODARONE HYDROCHLORIDE 200 MG: 200 TABLET ORAL at 14:57

## 2020-03-14 RX ADMIN — HYDROMORPHONE HYDROCHLORIDE 1 MG: 1 INJECTION, SOLUTION INTRAMUSCULAR; INTRAVENOUS; SUBCUTANEOUS at 02:25

## 2020-03-14 RX ADMIN — SENNOSIDES AND DOCUSATE SODIUM 2 TABLET: 8.6; 5 TABLET ORAL at 08:35

## 2020-03-14 RX ADMIN — HYDROMORPHONE HYDROCHLORIDE 0.5 MG: 1 INJECTION, SOLUTION INTRAMUSCULAR; INTRAVENOUS; SUBCUTANEOUS at 16:46

## 2020-03-14 RX ADMIN — HYDROMORPHONE HYDROCHLORIDE 1 MG: 1 INJECTION, SOLUTION INTRAMUSCULAR; INTRAVENOUS; SUBCUTANEOUS at 05:37

## 2020-03-14 RX ADMIN — OXYCODONE HYDROCHLORIDE AND ACETAMINOPHEN 1 TABLET: 7.5; 325 TABLET ORAL at 10:01

## 2020-03-14 RX ADMIN — CYCLOBENZAPRINE 10 MG: 10 TABLET, FILM COATED ORAL at 20:11

## 2020-03-14 RX ADMIN — POLYETHYLENE GLYCOL 3350 34 G: 17 POWDER, FOR SOLUTION ORAL at 08:35

## 2020-03-14 RX ADMIN — ENOXAPARIN SODIUM 40 MG: 40 INJECTION SUBCUTANEOUS at 12:54

## 2020-03-14 RX ADMIN — SODIUM CHLORIDE, PRESERVATIVE FREE 10 ML: 5 INJECTION INTRAVENOUS at 08:36

## 2020-03-14 RX ADMIN — METOPROLOL SUCCINATE 100 MG: 100 TABLET, FILM COATED, EXTENDED RELEASE ORAL at 20:11

## 2020-03-14 RX ADMIN — METOPROLOL SUCCINATE 100 MG: 100 TABLET, FILM COATED, EXTENDED RELEASE ORAL at 08:30

## 2020-03-14 RX ADMIN — AMIODARONE HYDROCHLORIDE 0.5 MG/MIN: 1.8 INJECTION, SOLUTION INTRAVENOUS at 10:01

## 2020-03-14 RX ADMIN — HYDROMORPHONE HYDROCHLORIDE 1 MG: 1 INJECTION, SOLUTION INTRAMUSCULAR; INTRAVENOUS; SUBCUTANEOUS at 12:54

## 2020-03-14 RX ADMIN — HYDROMORPHONE HYDROCHLORIDE 0.5 MG: 1 INJECTION, SOLUTION INTRAMUSCULAR; INTRAVENOUS; SUBCUTANEOUS at 16:38

## 2020-03-14 RX ADMIN — SENNOSIDES AND DOCUSATE SODIUM 2 TABLET: 8.6; 5 TABLET ORAL at 20:11

## 2020-03-14 RX ADMIN — SODIUM CHLORIDE, POTASSIUM CHLORIDE, SODIUM LACTATE AND CALCIUM CHLORIDE 75 ML/HR: 600; 310; 30; 20 INJECTION, SOLUTION INTRAVENOUS at 22:47

## 2020-03-14 RX ADMIN — LIDOCAINE 1 PATCH: 50 PATCH TOPICAL at 20:12

## 2020-03-14 NOTE — PLAN OF CARE
C/o abd pain, dilaudid/percocet, vs stable, bp, NAD, amb escamilla, amio PO      Problem: Pain, Chronic (Adult)  Goal: Acceptable Pain/Comfort Level and Functional Ability  Outcome: Ongoing (interventions implemented as appropriate)  Flowsheets (Taken 3/14/2020 1803)  Acceptable Pain/Comfort Level and Functional Ability: making progress toward outcome     Problem: Patient Care Overview  Goal: Plan of Care Review  Outcome: Ongoing (interventions implemented as appropriate)  Goal: Individualization and Mutuality  Outcome: Ongoing (interventions implemented as appropriate)  Goal: Discharge Needs Assessment  Outcome: Ongoing (interventions implemented as appropriate)  Goal: Interprofessional Rounds/Family Conf  Outcome: Ongoing (interventions implemented as appropriate)     Problem: Pancreatitis, Acute/Chronic (Adult)  Goal: Signs and Symptoms of Listed Potential Problems Will be Absent, Minimized or Managed (Pancreatitis, Acute/Chronic)  Outcome: Ongoing (interventions implemented as appropriate)     Problem: Arrhythmia/Dysrhythmia (Symptomatic) (Adult)  Goal: Signs and Symptoms of Listed Potential Problems Will be Absent, Minimized or Managed (Arrhythmia/Dysrhythmia)  Outcome: Ongoing (interventions implemented as appropriate)

## 2020-03-14 NOTE — PROGRESS NOTES
[facility]     Inpatient Follow Up Note    Patient Identification:  Name: Cesar Vann  Age: 54 y.o.  Sex: male  :  1965  MRN: [id number]    Information from:patient     CC: Pancreatitis    History:   No particular improvement in clinical situation.  Continues to have discomfort.  Not tolerating orals.  Low-grade fever.  Mild increase in the lipase.    Review of Systems:  Constitutional:  Negative   Cardiovascular:  Negative   Respiratory:  Negative             Problem List:  Patient Active Problem List    Diagnosis   • *Acute pancreatitis [K85.90]   • Acute constipation [K59.00]   • Atrial fibrillation (CMS/HCC) [I48.91]   • Back pain [M54.9]   • Morbid obesity (CMS/HCC) [E66.01]   • Essential hypertension [I10]   • Hyperglycemia [R73.9]   • Sleep apnea [G47.30]   • Hypercholesteremia [E78.00]   • Asthma [J45.909]     Current Meds:  MAR Reviewed  Scheduled Meds:  bisacodyl 10 mg Rectal Daily   enoxaparin 40 mg Subcutaneous Q24H   insulin lispro 0-7 Units Subcutaneous 4x Daily With Meals & Nightly   lidocaine 1 patch Transdermal Q12H   metoprolol succinate  mg Oral BID   polyethylene glycol 34 g Oral BID   potassium chloride 40 mEq Oral Daily   senna-docusate sodium 2 tablet Oral BID   sodium chloride 10 mL Intravenous Q12H     Continuous Infusions:  amiodarone 0.5 mg/min Last Rate: 0.5 mg/min (20 1001)   lactated ringers 75 mL/hr Last Rate: 75 mL/hr (20 100)     PRN Meds:.•  acetaminophen **OR** acetaminophen **OR** acetaminophen  •  bisacodyl  •  calcium carbonate  •  cyclobenzaprine  •  dextrose  •  dextrose  •  glucagon (human recombinant)  •  hydrALAZINE  •  HYDROmorphone  •  metoprolol tartrate  •  nitroglycerin  •  ondansetron **OR** ondansetron  •  oxyCODONE-acetaminophen  •  polyethylene glycol  •  potassium chloride **OR** potassium chloride **OR** potassium chloride  •  promethazine  •  sodium chloride  •  sodium  "chloride  Allergies:  Allergies   Allergen Reactions   • Latex Itching       Intake/Output:     Intake/Output Summary (Last 24 hours) at 3/14/2020 1230  Last data filed at 3/13/2020 1730  Gross per 24 hour   Intake 0 ml   Output --   Net 0 ml     New allergies/reactions:  None    Physical Exam:  Vitals:   Temp (24hrs), Av.3 °F (37.4 °C), Min:98.6 °F (37 °C), Max:100.3 °F (37.9 °C)    Temp:  [98.6 °F (37 °C)-100.3 °F (37.9 °C)] 99 °F (37.2 °C)  Heart Rate:  [76-82] 76  Resp:  [16-18] 18  BP: (156-183)/(71-98) 162/84  /84 (BP Location: Left arm, Patient Position: Lying)   Pulse 76   Temp 99 °F (37.2 °C) (Oral)   Resp 18   Ht 180.3 cm (71\")   Wt 122 kg (268 lb 8 oz)   SpO2 99%   BMI 37.45 kg/m²     Exam:  NAD  PERRLA. Sclerae and conjunctivae normal  HENT: external inspection normal. Hearing intact.  No respiratory distress.  Alert, oriented, normal affect.         DATA:  Radiology and Labs:   Recent Results (from the past 24 hour(s))   POC Glucose Once    Collection Time: 20  5:16 PM   Result Value Ref Range    Glucose 157 (H) 70 - 130 mg/dL   POC Glucose Once    Collection Time: 20  9:45 PM   Result Value Ref Range    Glucose 139 (H) 70 - 130 mg/dL   CBC (No Diff)    Collection Time: 20  5:26 AM   Result Value Ref Range    WBC 13.45 (H) 3.40 - 10.80 10*3/mm3    RBC 4.15 4.14 - 5.80 10*6/mm3    Hemoglobin 12.7 (L) 13.0 - 17.7 g/dL    Hematocrit 38.1 37.5 - 51.0 %    MCV 91.8 79.0 - 97.0 fL    MCH 30.6 26.6 - 33.0 pg    MCHC 33.3 31.5 - 35.7 g/dL    RDW 12.9 12.3 - 15.4 %    RDW-SD 42.8 37.0 - 54.0 fl    MPV 8.8 6.0 - 12.0 fL    Platelets 224 140 - 450 10*3/mm3   Basic Metabolic Panel    Collection Time: 20  5:26 AM   Result Value Ref Range    Glucose 134 (H) 65 - 99 mg/dL    BUN 17 6 - 20 mg/dL    Creatinine 0.76 0.76 - 1.27 mg/dL    Sodium 135 (L) 136 - 145 mmol/L    Potassium 3.6 3.5 - 5.2 mmol/L    Chloride 98 98 - 107 mmol/L    CO2 25.0 22.0 - 29.0 mmol/L    Calcium 8.3 (L) " 8.6 - 10.5 mg/dL    eGFR Non African Amer 107 >60 mL/min/1.73    BUN/Creatinine Ratio 22.4 7.0 - 25.0    Anion Gap 12.0 5.0 - 15.0 mmol/L   POC Glucose Once    Collection Time: 03/14/20  5:59 AM   Result Value Ref Range    Glucose 124 70 - 130 mg/dL   POC Glucose Once    Collection Time: 03/14/20 11:53 AM   Result Value Ref Range    Glucose 135 (H) 70 - 130 mg/dL       Assessment:   Problem List:     Acute pancreatitis    Essential hypertension    Hyperglycemia    Sleep apnea    Hypercholesteremia    Asthma    Atrial fibrillation (CMS/HCC)    Back pain    Morbid obesity (CMS/HCC)    Acute constipation    Patient is progress is very slow.  He really has not had any nutrition for about a week.  Triglycerides on admission were normal.    Plan:   If patient is not able to resume oral intake in the next day or 2, would recommend nasoenteric feeding.  This was carefully discussed with the patient.      Oleksandr Salinas MD  [facility]  3/14/2020

## 2020-03-14 NOTE — PROGRESS NOTES
" LOS: 7 days     Name: Cesar Vann  Age: 54 y.o.  Sex: male  :  1965  MRN: [id number]         Primary Care Physician: Bing Eduardo APRN    Subjective   Subjective  Feels as if his abdominal pain is somewhat improved today.  Still complains of constipation.  Does not feel ready to advance his diet beyond ice chips yet.  Asking for a suppository.  He has had a couple \"Sangeeta squirts\" but no significant bowel movement over the last couple of days.  No nausea or vomiting.    Objective   Vital Signs  Temp:  [98.6 °F (37 °C)-100.3 °F (37.9 °C)] 99 °F (37.2 °C)  Heart Rate:  [76-82] 76  Resp:  [16-18] 18  BP: (156-183)/(71-98) 162/84  Body mass index is 37.45 kg/m².    Objective:  General Appearance:  Comfortable and in no acute distress.    Vital signs: (most recent): Blood pressure 162/84, pulse 76, temperature 99 °F (37.2 °C), temperature source Oral, resp. rate 18, height 180.3 cm (71\"), weight 122 kg (268 lb 8 oz), SpO2 99 %.    Lungs:  Normal effort and normal respiratory rate.    Heart: Normal rate.  Regular rhythm.    Abdomen: Abdomen is soft and distended.  Bowel sounds are normal.   There is epigastric tenderness.    Extremities: There is no dependent edema or local swelling.    Neurological: Patient is alert and oriented to person, place and time.    Skin:  Warm and dry.              Results Review:       I reviewed the patient's new clinical results.    Results from last 7 days   Lab Units 20  0520  0435 20  0556 03/10/20  0558 20  0427 20  0600   WBC 10*3/mm3 13.45* 10.21 12.83* 12.33* 23.96* 23.74*   HEMOGLOBIN g/dL 12.7* 12.2* 13.6 13.2 16.7 16.0   PLATELETS 10*3/mm3 224 183 139* 154 180 164     Results from last 7 days   Lab Units 20  0526 20  0435 20  0359 20  0816 03/10/20  1625 03/10/20  0558 20  2312 20  0427 20  0600   SODIUM mmol/L 135* 139  --  137  --  139  --  140 135*   POTASSIUM mmol/L 3.6 3.6 3.1* " 3.9 3.9 3.3* 3.9 3.4* 3.4*   CHLORIDE mmol/L 98 102  --  99  --  103  --  101 99   CO2 mmol/L 25.0 24.9  --  27.6  --  27.0  --  23.0 23.0   BUN mg/dL 17 20  --  19  --  26*  --  24* 19   CREATININE mg/dL 0.76 0.82  --  0.78  --  0.89  --  1.01 0.84   CALCIUM mg/dL 8.3* 7.7*  --  7.8*  --  7.8*  --  8.4* 8.2*   GLUCOSE mg/dL 134* 140*  --  143*  --  139*  --  147* 203*                 Scheduled Meds:     enoxaparin 40 mg Subcutaneous Q24H   insulin lispro 0-7 Units Subcutaneous 4x Daily With Meals & Nightly   lidocaine 1 patch Transdermal Q12H   metoprolol succinate  mg Oral BID   polyethylene glycol 34 g Oral BID   potassium chloride 40 mEq Oral Daily   senna-docusate sodium 2 tablet Oral BID   sodium chloride 10 mL Intravenous Q12H     PRN Meds:   •  acetaminophen **OR** acetaminophen **OR** acetaminophen  •  bisacodyl  •  bisacodyl  •  calcium carbonate  •  cyclobenzaprine  •  dextrose  •  dextrose  •  glucagon (human recombinant)  •  hydrALAZINE  •  HYDROmorphone  •  metoprolol tartrate  •  nitroglycerin  •  ondansetron **OR** ondansetron  •  oxyCODONE-acetaminophen  •  polyethylene glycol  •  potassium chloride **OR** potassium chloride **OR** potassium chloride  •  promethazine  •  sodium chloride  •  sodium chloride  Continuous Infusions:    amiodarone 0.5 mg/min Last Rate: 0.5 mg/min (03/14/20 1001)   lactated ringers 75 mL/hr Last Rate: 75 mL/hr (03/14/20 1001)       Assessment/Plan   Active Hospital Problems    Diagnosis  POA   • **Acute pancreatitis [K85.90]  Yes   • Acute constipation [K59.00]  Unknown   • Atrial fibrillation (CMS/HCC) [I48.91]  Unknown   • Back pain [M54.9]  Unknown   • Morbid obesity (CMS/HCC) [E66.01]  Unknown   • Essential hypertension [I10]  Unknown   • Hyperglycemia [R73.9]  Unknown   • Sleep apnea [G47.30]  Unknown   • Hypercholesteremia [E78.00]  Unknown   • Asthma [J45.909]  Unknown      Resolved Hospital Problems   No resolved problems to display.       Assessment &  "Plan    54 y.o. male admitted with Acute pancreatitis.     · Pancreatitis- etiology uncertain.  Follow-up IgG levels  § Repeat CT of the abdomen pelvis 3/11 showed worsening inflammation associated with his pancreatitis but no pseudocyst or abscess formation.  He had a \"low-grade fever\" charted last night at 100.3 and a mild leukocytosis today.  Clinically he looks better today but we will keep a very close eye on this.  § Continue gut rest along with IV fluids, analgesics  § If cannot advance diet soon then we will need to consider alternate forms of nutrition.  Await additional input from GI.  · Constipation related to above plus opiate use  § Will try a suppository today in addition to what he is already on  · A. fib-  went back into rapid A. fib  § On amiodarone drip with better control of heart rate  § Cardiology following  § No anticoagulation for now beyond prophylactic Lovenox until we see what happens with his acute pancreatitis  § Replace potassium prn  · Morbid obesity    Tate Mckoy MD  [facility]  03/14/20  11:26 AM    "

## 2020-03-14 NOTE — PROGRESS NOTES
"Cesar YESIKA Vann  1965 54 y.o.  [id number]      Patient Care Team:  Bing Eduardo APRN as PCP - General (Family Medicine)    CC: Pancreatitis paroxysmal A. fib normal EF    Interval History: Doing well in sinus      Objective   Vital Signs  Temp:  [98.6 °F (37 °C)-100.3 °F (37.9 °C)] 99 °F (37.2 °C)  Heart Rate:  [76-82] 76  Resp:  [16-18] 18  BP: (156-183)/(71-98) 162/84    Intake/Output Summary (Last 24 hours) at 3/14/2020 1224  Last data filed at 3/13/2020 1730  Gross per 24 hour   Intake 0 ml   Output --   Net 0 ml     Flowsheet Rows      First Filed Value   Admission Height  180.3 cm (71\") Documented at 03/07/2020 0210   Admission Weight  122 kg (268 lb 8 oz) Documented at 03/07/2020 0226          Physical Exam:   General Appearance:    Alert,oriented, in no acute distress   Lungs:     Clear to auscultation,BS are equal    Heart:    Normal S1 and S2, RRR without murmur, gallop or rub   HEENT:    Sclerae are clear, no JVD or adenopathy   Abdomen:     Normal bowel sounds, soft non-tender, non-distended, no HSM   Extremities:   Moves all extremities well, no edema, no cyanosis, no             Redness, no rash     Medication Review:        bisacodyl 10 mg Rectal Daily   enoxaparin 40 mg Subcutaneous Q24H   insulin lispro 0-7 Units Subcutaneous 4x Daily With Meals & Nightly   lidocaine 1 patch Transdermal Q12H   metoprolol succinate  mg Oral BID   polyethylene glycol 34 g Oral BID   potassium chloride 40 mEq Oral Daily   senna-docusate sodium 2 tablet Oral BID   sodium chloride 10 mL Intravenous Q12H       amiodarone 0.5 mg/min Last Rate: 0.5 mg/min (03/14/20 1001)   lactated ringers 75 mL/hr Last Rate: 75 mL/hr (03/14/20 1001)         I reviewed the patient's new clinical results.  I personally viewed and interpreted the patient's EKG/Telemetry data    Assessment/Plan  Active Hospital Problems    Diagnosis  POA   • **Acute pancreatitis [K85.90]  Yes   • Acute constipation [K59.00]  Unknown   • " Atrial fibrillation (CMS/HCC) [I48.91]  Unknown   • Back pain [M54.9]  Unknown   • Morbid obesity (CMS/HCC) [E66.01]  Unknown   • Essential hypertension [I10]  Unknown   • Hyperglycemia [R73.9]  Unknown   • Sleep apnea [G47.30]  Unknown   • Hypercholesteremia [E78.00]  Unknown   • Asthma [J45.909]  Unknown      Resolved Hospital Problems   No resolved problems to display.       In sinus on IV amiodarone we will switch to p.o. not anticoagulated we will see him again on Monday    Norm Card MD  03/14/20  12:24

## 2020-03-14 NOTE — PLAN OF CARE
Problem: Patient Care Overview  Goal: Plan of Care Review  Outcome: Ongoing (interventions implemented as appropriate)  Flowsheets  Taken 3/14/2020 0334  Progress: no change  Outcome Summary: Pt A & O, up ad gaye in room, medicated per orders. Amio gtt and IV fluids cont. CPAP worn throughout the night. BP monitored. IV pain meds requested Q2 hours. 1x dose of norvasc given for elevated BP. No s/s of distress at this time, VSS, will cont to monitor.  Taken 3/13/2020 2045  Plan of Care Reviewed With: patient     Problem: Pancreatitis, Acute/Chronic (Adult)  Goal: Signs and Symptoms of Listed Potential Problems Will be Absent, Minimized or Managed (Pancreatitis, Acute/Chronic)  Outcome: Ongoing (interventions implemented as appropriate)  Flowsheets  Taken 3/13/2020 0351 by Navjot Mcdonald RN  Problems Assessed (Pancreatitis): all  Taken 3/14/2020 0334 by Shankar Almaguer RN  Problems Present (Pancreatitis): diarrhea;pain     Problem: Pain, Chronic (Adult)  Goal: Acceptable Pain/Comfort Level and Functional Ability  Outcome: Ongoing (interventions implemented as appropriate)  Flowsheets (Taken 3/14/2020 0334)  Acceptable Pain/Comfort Level and Functional Ability: making progress toward outcome     Problem: Arrhythmia/Dysrhythmia (Symptomatic) (Adult)  Goal: Signs and Symptoms of Listed Potential Problems Will be Absent, Minimized or Managed (Arrhythmia/Dysrhythmia)  Outcome: Ongoing (interventions implemented as appropriate)  Flowsheets (Taken 3/13/2020 0351 by Navjot Mcdonald RN)  Problems Assessed (Arrhythmia/Dysrhythmia): all  Problems Present (Dysrhythmia): none

## 2020-03-15 LAB
ALBUMIN SERPL-MCNC: 2.5 G/DL (ref 3.5–5.2)
ALBUMIN/GLOB SERPL: 0.8 G/DL
ALP SERPL-CCNC: 66 U/L (ref 39–117)
ALT SERPL W P-5'-P-CCNC: 30 U/L (ref 1–41)
ANION GAP SERPL CALCULATED.3IONS-SCNC: 10.5 MMOL/L (ref 5–15)
AST SERPL-CCNC: 33 U/L (ref 1–40)
BILIRUB SERPL-MCNC: 0.6 MG/DL (ref 0.2–1.2)
BUN BLD-MCNC: 12 MG/DL (ref 6–20)
BUN/CREAT SERPL: 16.2 (ref 7–25)
CALCIUM SPEC-SCNC: 8 MG/DL (ref 8.6–10.5)
CHLORIDE SERPL-SCNC: 99 MMOL/L (ref 98–107)
CO2 SERPL-SCNC: 23.5 MMOL/L (ref 22–29)
CREAT BLD-MCNC: 0.74 MG/DL (ref 0.76–1.27)
DEPRECATED RDW RBC AUTO: 42.1 FL (ref 37–54)
ERYTHROCYTE [DISTWIDTH] IN BLOOD BY AUTOMATED COUNT: 12.6 % (ref 12.3–15.4)
GFR SERPL CREATININE-BSD FRML MDRD: 110 ML/MIN/1.73
GLOBULIN UR ELPH-MCNC: 3 GM/DL
GLUCOSE BLD-MCNC: 103 MG/DL (ref 65–99)
GLUCOSE BLDC GLUCOMTR-MCNC: 105 MG/DL (ref 70–130)
GLUCOSE BLDC GLUCOMTR-MCNC: 125 MG/DL (ref 70–130)
GLUCOSE BLDC GLUCOMTR-MCNC: 92 MG/DL (ref 70–130)
GLUCOSE BLDC GLUCOMTR-MCNC: 94 MG/DL (ref 70–130)
HCT VFR BLD AUTO: 36 % (ref 37.5–51)
HGB BLD-MCNC: 12.6 G/DL (ref 13–17.7)
MCH RBC QN AUTO: 31.7 PG (ref 26.6–33)
MCHC RBC AUTO-ENTMCNC: 35 G/DL (ref 31.5–35.7)
MCV RBC AUTO: 90.7 FL (ref 79–97)
PLATELET # BLD AUTO: 214 10*3/MM3 (ref 140–450)
PMV BLD AUTO: 8.6 FL (ref 6–12)
POTASSIUM BLD-SCNC: 3.8 MMOL/L (ref 3.5–5.2)
PROT SERPL-MCNC: 5.5 G/DL (ref 6–8.5)
RBC # BLD AUTO: 3.97 10*6/MM3 (ref 4.14–5.8)
SODIUM BLD-SCNC: 133 MMOL/L (ref 136–145)
WBC NRBC COR # BLD: 13.77 10*3/MM3 (ref 3.4–10.8)

## 2020-03-15 PROCEDURE — 25010000002 ENOXAPARIN PER 10 MG: Performed by: HOSPITALIST

## 2020-03-15 PROCEDURE — 25010000002 HYDRALAZINE PER 20 MG: Performed by: INTERNAL MEDICINE

## 2020-03-15 PROCEDURE — 99232 SBSQ HOSP IP/OBS MODERATE 35: CPT | Performed by: INTERNAL MEDICINE

## 2020-03-15 PROCEDURE — 85027 COMPLETE CBC AUTOMATED: CPT | Performed by: INTERNAL MEDICINE

## 2020-03-15 PROCEDURE — 25010000002 FUROSEMIDE PER 20 MG: Performed by: INTERNAL MEDICINE

## 2020-03-15 PROCEDURE — 25010000002 HYDROMORPHONE 1 MG/ML SOLUTION: Performed by: HOSPITALIST

## 2020-03-15 PROCEDURE — 82962 GLUCOSE BLOOD TEST: CPT

## 2020-03-15 PROCEDURE — 80053 COMPREHEN METABOLIC PANEL: CPT | Performed by: INTERNAL MEDICINE

## 2020-03-15 RX ORDER — POTASSIUM CHLORIDE 750 MG/1
20 CAPSULE, EXTENDED RELEASE ORAL ONCE
Status: COMPLETED | OUTPATIENT
Start: 2020-03-15 | End: 2020-03-15

## 2020-03-15 RX ORDER — FUROSEMIDE 10 MG/ML
40 INJECTION INTRAMUSCULAR; INTRAVENOUS ONCE
Status: COMPLETED | OUTPATIENT
Start: 2020-03-15 | End: 2020-03-15

## 2020-03-15 RX ADMIN — HYDRALAZINE HYDROCHLORIDE 10 MG: 20 INJECTION INTRAMUSCULAR; INTRAVENOUS at 17:55

## 2020-03-15 RX ADMIN — AMIODARONE HYDROCHLORIDE 200 MG: 200 TABLET ORAL at 20:12

## 2020-03-15 RX ADMIN — OXYCODONE HYDROCHLORIDE AND ACETAMINOPHEN 1 TABLET: 7.5; 325 TABLET ORAL at 17:55

## 2020-03-15 RX ADMIN — SENNOSIDES AND DOCUSATE SODIUM 2 TABLET: 8.6; 5 TABLET ORAL at 20:12

## 2020-03-15 RX ADMIN — ENOXAPARIN SODIUM 40 MG: 40 INJECTION SUBCUTANEOUS at 15:41

## 2020-03-15 RX ADMIN — SODIUM CHLORIDE, PRESERVATIVE FREE 10 ML: 5 INJECTION INTRAVENOUS at 20:12

## 2020-03-15 RX ADMIN — LIDOCAINE 1 PATCH: 50 PATCH TOPICAL at 15:40

## 2020-03-15 RX ADMIN — HYDROMORPHONE HYDROCHLORIDE 1 MG: 1 INJECTION, SOLUTION INTRAMUSCULAR; INTRAVENOUS; SUBCUTANEOUS at 20:12

## 2020-03-15 RX ADMIN — HYDROMORPHONE HYDROCHLORIDE 1 MG: 1 INJECTION, SOLUTION INTRAMUSCULAR; INTRAVENOUS; SUBCUTANEOUS at 00:45

## 2020-03-15 RX ADMIN — METOPROLOL SUCCINATE 100 MG: 100 TABLET, FILM COATED, EXTENDED RELEASE ORAL at 20:12

## 2020-03-15 RX ADMIN — FUROSEMIDE 40 MG: 10 INJECTION, SOLUTION INTRAMUSCULAR; INTRAVENOUS at 15:41

## 2020-03-15 RX ADMIN — POTASSIUM CHLORIDE 40 MEQ: 10 CAPSULE, COATED, EXTENDED RELEASE ORAL at 08:17

## 2020-03-15 RX ADMIN — HYDROMORPHONE HYDROCHLORIDE 1 MG: 1 INJECTION, SOLUTION INTRAMUSCULAR; INTRAVENOUS; SUBCUTANEOUS at 23:40

## 2020-03-15 RX ADMIN — HYDROMORPHONE HYDROCHLORIDE 1 MG: 1 INJECTION, SOLUTION INTRAMUSCULAR; INTRAVENOUS; SUBCUTANEOUS at 08:18

## 2020-03-15 RX ADMIN — SENNOSIDES AND DOCUSATE SODIUM 2 TABLET: 8.6; 5 TABLET ORAL at 08:17

## 2020-03-15 RX ADMIN — POTASSIUM CHLORIDE 20 MEQ: 10 CAPSULE, COATED, EXTENDED RELEASE ORAL at 15:41

## 2020-03-15 RX ADMIN — AMIODARONE HYDROCHLORIDE 200 MG: 200 TABLET ORAL at 08:17

## 2020-03-15 RX ADMIN — CYCLOBENZAPRINE 10 MG: 10 TABLET, FILM COATED ORAL at 11:19

## 2020-03-15 RX ADMIN — METOPROLOL SUCCINATE 100 MG: 100 TABLET, FILM COATED, EXTENDED RELEASE ORAL at 08:17

## 2020-03-15 RX ADMIN — SODIUM CHLORIDE, PRESERVATIVE FREE 10 ML: 5 INJECTION INTRAVENOUS at 09:00

## 2020-03-15 RX ADMIN — OXYCODONE HYDROCHLORIDE AND ACETAMINOPHEN 1 TABLET: 7.5; 325 TABLET ORAL at 11:19

## 2020-03-15 RX ADMIN — OXYCODONE HYDROCHLORIDE AND ACETAMINOPHEN 1 TABLET: 7.5; 325 TABLET ORAL at 04:19

## 2020-03-15 NOTE — PLAN OF CARE
C/o abd pain, dilaudid/percocet/flexeril, lasix x1 BLE edema, vs stable, NAD, BM today      Problem: Pain, Chronic (Adult)  Goal: Acceptable Pain/Comfort Level and Functional Ability  Outcome: Ongoing (interventions implemented as appropriate)  Flowsheets (Taken 3/15/2020 4160)  Acceptable Pain/Comfort Level and Functional Ability: making progress toward outcome     Problem: Patient Care Overview  Goal: Plan of Care Review  Outcome: Ongoing (interventions implemented as appropriate)  Goal: Individualization and Mutuality  Outcome: Ongoing (interventions implemented as appropriate)  Goal: Discharge Needs Assessment  Outcome: Ongoing (interventions implemented as appropriate)  Goal: Interprofessional Rounds/Family Conf  Outcome: Ongoing (interventions implemented as appropriate)     Problem: Pancreatitis, Acute/Chronic (Adult)  Goal: Signs and Symptoms of Listed Potential Problems Will be Absent, Minimized or Managed (Pancreatitis, Acute/Chronic)  Outcome: Ongoing (interventions implemented as appropriate)     Problem: Arrhythmia/Dysrhythmia (Symptomatic) (Adult)  Goal: Signs and Symptoms of Listed Potential Problems Will be Absent, Minimized or Managed (Arrhythmia/Dysrhythmia)  Outcome: Ongoing (interventions implemented as appropriate)

## 2020-03-15 NOTE — PROGRESS NOTES
" LOS: 8 days     Name: Cesar Vann  Age: 54 y.o.  Sex: male  :  1965  MRN: [id number]         Primary Care Physician: Bing Eduardo APRN    Subjective   Subjective  States that his stomach pain is improving.  Had 2 larger, liquid bowel movements and this made him feel considerably better.  Ready to try clear liquids in the morning.  Feels as if his lower extremity edema is worsening.    Objective   Vital Signs  Temp:  [98.8 °F (37.1 °C)-99.9 °F (37.7 °C)] 99.9 °F (37.7 °C)  Heart Rate:  [72-83] 83  Resp:  [18] 18  BP: (140-174)/(69-97) 169/80  Body mass index is 37.45 kg/m².    Objective:  General Appearance:  Comfortable and in no acute distress.    Vital signs: (most recent): Blood pressure 169/80, pulse 83, temperature 99.9 °F (37.7 °C), temperature source Oral, resp. rate 18, height 180.3 cm (71\"), weight 122 kg (268 lb 8 oz), SpO2 97 %.    Lungs:  Normal effort and normal respiratory rate.    Heart: Normal rate.  Regular rhythm.    Abdomen: Abdomen is soft.  Bowel sounds are normal.   There is epigastric tenderness.    Extremities: There is dependent edema.  There is no local swelling.  (2+ pitting of the bilateral lower extremities)  Neurological: Patient is alert and oriented to person, place and time.    Skin:  Warm and dry.              Results Review:       I reviewed the patient's new clinical results.    Results from last 7 days   Lab Units 03/15/20  0608 20  0526 20  0435 20  0556 03/10/20  0558 20  0427   WBC 10*3/mm3 13.77* 13.45* 10.21 12.83* 12.33* 23.96*   HEMOGLOBIN g/dL 12.6* 12.7* 12.2* 13.6 13.2 16.7   PLATELETS 10*3/mm3 214 224 183 139* 154 180     Results from last 7 days   Lab Units 03/15/20  0608 20  0526 20  0435 20  0359 20  0816 03/10/20  1625 03/10/20  0558  20  0427   SODIUM mmol/L 133* 135* 139  --  137  --  139  --  140   POTASSIUM mmol/L 3.8 3.6 3.6 3.1* 3.9 3.9 3.3*   < > 3.4*   CHLORIDE mmol/L 99 98 102  " --  99  --  103  --  101   CO2 mmol/L 23.5 25.0 24.9  --  27.6  --  27.0  --  23.0   BUN mg/dL 12 17 20  --  19  --  26*  --  24*   CREATININE mg/dL 0.74* 0.76 0.82  --  0.78  --  0.89  --  1.01   CALCIUM mg/dL 8.0* 8.3* 7.7*  --  7.8*  --  7.8*  --  8.4*   GLUCOSE mg/dL 103* 134* 140*  --  143*  --  139*  --  147*    < > = values in this interval not displayed.                 Scheduled Meds:     amiodarone 200 mg Oral Q12H   bisacodyl 10 mg Rectal Daily   enoxaparin 40 mg Subcutaneous Q24H   insulin lispro 0-7 Units Subcutaneous 4x Daily With Meals & Nightly   lidocaine 1 patch Transdermal Q12H   metoprolol succinate  mg Oral BID   polyethylene glycol 34 g Oral BID   potassium chloride 40 mEq Oral Daily   senna-docusate sodium 2 tablet Oral BID   sodium chloride 10 mL Intravenous Q12H     PRN Meds:   •  acetaminophen **OR** acetaminophen **OR** acetaminophen  •  bisacodyl  •  calcium carbonate  •  cyclobenzaprine  •  dextrose  •  dextrose  •  glucagon (human recombinant)  •  hydrALAZINE  •  HYDROmorphone  •  metoprolol tartrate  •  nitroglycerin  •  ondansetron **OR** ondansetron  •  oxyCODONE-acetaminophen  •  polyethylene glycol  •  potassium chloride **OR** potassium chloride **OR** potassium chloride  •  promethazine  •  sodium chloride  •  sodium chloride  Continuous Infusions:    lactated ringers 75 mL/hr Last Rate: 75 mL/hr (03/14/20 2247)       Assessment/Plan   Active Hospital Problems    Diagnosis  POA   • **Acute pancreatitis [K85.90]  Yes   • Acute constipation [K59.00]  Unknown   • Atrial fibrillation (CMS/HCC) [I48.91]  Unknown   • Back pain [M54.9]  Unknown   • Morbid obesity (CMS/HCC) [E66.01]  Unknown   • Essential hypertension [I10]  Unknown   • Hyperglycemia [R73.9]  Unknown   • Sleep apnea [G47.30]  Unknown   • Hypercholesteremia [E78.00]  Unknown   • Asthma [J45.909]  Unknown      Resolved Hospital Problems   No resolved problems to display.       Assessment & Plan    54  y.o. male admitted with Acute pancreatitis.     · Pancreatitis- etiology uncertain.  Follow-up IgG levels  § Repeat CT of the abdomen pelvis 3/11 showed worsening inflammation associated with his pancreatitis but no pseudocyst or abscess formation.    Plans noted for initiation of clear liquids in the morning.  § Continue gut rest along with IV fluids, analgesics  § If he does not tolerate clear liquids then will place cortrak for nutrition  · Constipation related to above plus opiate use  § Bowels moving better the past 24 hours  · A. fib-  went back into rapid A. fib  § On oral amiodarone  § Cardiology following  § No anticoagulation for now beyond prophylactic Lovenox until we see what happens with his acute pancreatitis  § Replace potassium prn  § We will give a dose of Lasix with potassium today  · Morbid obesity    Tate Mckoy MD  [facility]  03/15/20  2:06 PM

## 2020-03-15 NOTE — PROGRESS NOTES
[facility], operated by [facility]/[place]     Inpatient Follow Up Note    Patient Identification:  Name: Cesar Vann  Age: 54 y.o.  Sex: male  :  1965  MRN: [id number]    Information from:patient     CC: Pancreatitis    History:   He is feeling better.  He is now up and about.  Finished shaving, getting ready to shower.  No abdominal pain or nausea.  Having small liquid bowel movements.    Review of Systems:  Constitutional:  Negative   Cardiovascular:  Negative   Respiratory:  Negative             Problem List:  Patient Active Problem List    Diagnosis   • *Acute pancreatitis [K85.90]   • Acute constipation [K59.00]   • Atrial fibrillation (CMS/HCC) [I48.91]   • Back pain [M54.9]   • Morbid obesity (CMS/HCC) [E66.01]   • Essential hypertension [I10]   • Hyperglycemia [R73.9]   • Sleep apnea [G47.30]   • Hypercholesteremia [E78.00]   • Asthma [J45.909]     Current Meds:  MAR Reviewed  Scheduled Meds:  amiodarone 200 mg Oral Q12H   bisacodyl 10 mg Rectal Daily   enoxaparin 40 mg Subcutaneous Q24H   insulin lispro 0-7 Units Subcutaneous 4x Daily With Meals & Nightly   lidocaine 1 patch Transdermal Q12H   metoprolol succinate  mg Oral BID   polyethylene glycol 34 g Oral BID   potassium chloride 40 mEq Oral Daily   senna-docusate sodium 2 tablet Oral BID   sodium chloride 10 mL Intravenous Q12H     Continuous Infusions:  lactated ringers 75 mL/hr Last Rate: 75 mL/hr (20 2247)     PRN Meds:.•  acetaminophen **OR** acetaminophen **OR** acetaminophen  •  bisacodyl  •  calcium carbonate  •  cyclobenzaprine  •  dextrose  •  dextrose  •  glucagon (human recombinant)  •  hydrALAZINE  •  HYDROmorphone  •  metoprolol tartrate  •  nitroglycerin  •  ondansetron **OR** ondansetron  •  oxyCODONE-acetaminophen  •  polyethylene glycol  •  potassium chloride **OR** potassium chloride **OR** potassium chloride  •  promethazine  •  sodium chloride  •  sodium chloride  Allergies:  Allergies   Allergen  "Reactions   • Latex Itching       Intake/Output:   No intake or output data in the 24 hours ending 03/15/20 1235  New allergies/reactions:  None    Physical Exam:  Vitals:   Temp (24hrs), Av °F (37.2 °C), Min:98.8 °F (37.1 °C), Max:99.5 °F (37.5 °C)    Temp:  [98.8 °F (37.1 °C)-99.5 °F (37.5 °C)] 98.8 °F (37.1 °C)  Heart Rate:  [72-81] 81  Resp:  [16-18] 18  BP: (140-174)/(69-97) 174/84  /84 (BP Location: Left arm, Patient Position: Lying)   Pulse 81   Temp 98.8 °F (37.1 °C) (Oral)   Resp 18   Ht 180.3 cm (71\")   Wt 122 kg (268 lb 8 oz)   SpO2 98%   BMI 37.45 kg/m²     Exam:  NAD  PERRLA. Sclerae and conjunctivae normal  HENT: external inspection normal. Hearing intact.  No respiratory distress.  Alert, oriented, normal affect.         DATA:  Radiology and Labs:   Recent Results (from the past 24 hour(s))   POC Glucose Once    Collection Time: 20  4:29 PM   Result Value Ref Range    Glucose 114 70 - 130 mg/dL   POC Glucose Once    Collection Time: 20  9:01 PM   Result Value Ref Range    Glucose 128 70 - 130 mg/dL   Comprehensive Metabolic Panel    Collection Time: 03/15/20  6:08 AM   Result Value Ref Range    Glucose 103 (H) 65 - 99 mg/dL    BUN 12 6 - 20 mg/dL    Creatinine 0.74 (L) 0.76 - 1.27 mg/dL    Sodium 133 (L) 136 - 145 mmol/L    Potassium 3.8 3.5 - 5.2 mmol/L    Chloride 99 98 - 107 mmol/L    CO2 23.5 22.0 - 29.0 mmol/L    Calcium 8.0 (L) 8.6 - 10.5 mg/dL    Total Protein 5.5 (L) 6.0 - 8.5 g/dL    Albumin 2.50 (L) 3.50 - 5.20 g/dL    ALT (SGPT) 30 1 - 41 U/L    AST (SGOT) 33 1 - 40 U/L    Alkaline Phosphatase 66 39 - 117 U/L    Total Bilirubin 0.6 0.2 - 1.2 mg/dL    eGFR Non African Amer 110 >60 mL/min/1.73    Globulin 3.0 gm/dL    A/G Ratio 0.8 g/dL    BUN/Creatinine Ratio 16.2 7.0 - 25.0    Anion Gap 10.5 5.0 - 15.0 mmol/L   CBC (No Diff)    Collection Time: 03/15/20  6:08 AM   Result Value Ref Range    WBC 13.77 (H) 3.40 - 10.80 10*3/mm3    RBC 3.97 (L) 4.14 - 5.80 10*6/mm3 "    Hemoglobin 12.6 (L) 13.0 - 17.7 g/dL    Hematocrit 36.0 (L) 37.5 - 51.0 %    MCV 90.7 79.0 - 97.0 fL    MCH 31.7 26.6 - 33.0 pg    MCHC 35.0 31.5 - 35.7 g/dL    RDW 12.6 12.3 - 15.4 %    RDW-SD 42.1 37.0 - 54.0 fl    MPV 8.6 6.0 - 12.0 fL    Platelets 214 140 - 450 10*3/mm3   POC Glucose Once    Collection Time: 03/15/20  6:43 AM   Result Value Ref Range    Glucose 92 70 - 130 mg/dL   POC Glucose Once    Collection Time: 03/15/20 12:07 PM   Result Value Ref Range    Glucose 94 70 - 130 mg/dL       Assessment:   Problem List:     Acute pancreatitis    Essential hypertension    Hyperglycemia    Sleep apnea    Hypercholesteremia    Asthma    Atrial fibrillation (CMS/HCC)    Back pain    Morbid obesity (CMS/HCC)    Acute constipation    Patient making some progress now.    Plan:   I think he could tolerate a trial of clear liquids but patient is hesitant to do so and would like to wait until tomorrow.  We will go ahead and order clear liquids for the morning.  If he fails this trial, he understands that he will need to go on nasoenteric feeding for a while.      Oleksandr Salinas MD  [facility]  3/15/2020

## 2020-03-15 NOTE — PLAN OF CARE
Problem: Patient Care Overview  Goal: Plan of Care Review  Outcome: Ongoing (interventions implemented as appropriate)  Flowsheets  Taken 3/15/2020 0329  Progress: no change  Outcome Summary: Pt A & O, up ad gaye in room, medicated per orders, PO amio, IV fluids cont. BP monitored. C/o pain, treated with PRN PO and IV pain meds. No s/s of distress at this time, VSS, will cont to monitor.  Taken 3/15/2020 0027  Plan of Care Reviewed With: patient     Problem: Pain, Chronic (Adult)  Goal: Acceptable Pain/Comfort Level and Functional Ability  Outcome: Ongoing (interventions implemented as appropriate)  Flowsheets (Taken 3/15/2020 0329)  Acceptable Pain/Comfort Level and Functional Ability: making progress toward outcome     Problem: Pancreatitis, Acute/Chronic (Adult)  Goal: Signs and Symptoms of Listed Potential Problems Will be Absent, Minimized or Managed (Pancreatitis, Acute/Chronic)  Outcome: Ongoing (interventions implemented as appropriate)  Flowsheets  Taken 3/13/2020 0351 by Navjot Mcdonald RN  Problems Assessed (Pancreatitis): all  Taken 3/14/2020 0334 by Shankar Almgauer RN  Problems Present (Pancreatitis): diarrhea;pain     Problem: Arrhythmia/Dysrhythmia (Symptomatic) (Adult)  Goal: Signs and Symptoms of Listed Potential Problems Will be Absent, Minimized or Managed (Arrhythmia/Dysrhythmia)  Outcome: Ongoing (interventions implemented as appropriate)  Flowsheets (Taken 3/13/2020 0351 by Navjot Mcdonald RN)  Problems Assessed (Arrhythmia/Dysrhythmia): all  Problems Present (Dysrhythmia): none

## 2020-03-16 ENCOUNTER — APPOINTMENT (OUTPATIENT)
Dept: CARDIOLOGY | Facility: HOSPITAL | Age: 55
End: 2020-03-16

## 2020-03-16 ENCOUNTER — APPOINTMENT (OUTPATIENT)
Dept: GENERAL RADIOLOGY | Facility: HOSPITAL | Age: 55
End: 2020-03-16

## 2020-03-16 PROBLEM — R50.9 FEVER: Status: ACTIVE | Noted: 2020-03-16

## 2020-03-16 LAB
ANION GAP SERPL CALCULATED.3IONS-SCNC: 14.9 MMOL/L (ref 5–15)
BH CV LOWER VASCULAR LEFT COMMON FEMORAL AUGMENT: NORMAL
BH CV LOWER VASCULAR LEFT COMMON FEMORAL COMPETENT: NORMAL
BH CV LOWER VASCULAR LEFT COMMON FEMORAL COMPRESS: NORMAL
BH CV LOWER VASCULAR LEFT COMMON FEMORAL PHASIC: NORMAL
BH CV LOWER VASCULAR LEFT COMMON FEMORAL SPONT: NORMAL
BH CV LOWER VASCULAR LEFT DISTAL FEMORAL COMPRESS: NORMAL
BH CV LOWER VASCULAR LEFT GASTRONEMIUS COMPRESS: NORMAL
BH CV LOWER VASCULAR LEFT GREATER SAPH AK COMPRESS: NORMAL
BH CV LOWER VASCULAR LEFT GREATER SAPH BK COMPRESS: NORMAL
BH CV LOWER VASCULAR LEFT MID FEMORAL AUGMENT: NORMAL
BH CV LOWER VASCULAR LEFT MID FEMORAL COMPETENT: NORMAL
BH CV LOWER VASCULAR LEFT MID FEMORAL COMPRESS: NORMAL
BH CV LOWER VASCULAR LEFT MID FEMORAL PHASIC: NORMAL
BH CV LOWER VASCULAR LEFT MID FEMORAL SPONT: NORMAL
BH CV LOWER VASCULAR LEFT PERONEAL COMPRESS: NORMAL
BH CV LOWER VASCULAR LEFT POPLITEAL AUGMENT: NORMAL
BH CV LOWER VASCULAR LEFT POPLITEAL COMPETENT: NORMAL
BH CV LOWER VASCULAR LEFT POPLITEAL COMPRESS: NORMAL
BH CV LOWER VASCULAR LEFT POPLITEAL PHASIC: NORMAL
BH CV LOWER VASCULAR LEFT POPLITEAL SPONT: NORMAL
BH CV LOWER VASCULAR LEFT POSTERIOR TIBIAL COMPRESS: NORMAL
BH CV LOWER VASCULAR LEFT PROFUNDA FEMORAL COMPRESS: NORMAL
BH CV LOWER VASCULAR LEFT PROXIMAL FEMORAL COMPRESS: NORMAL
BH CV LOWER VASCULAR LEFT SAPHENOFEMORAL JUNCTION COMPRESS: NORMAL
BH CV LOWER VASCULAR RIGHT COMMON FEMORAL AUGMENT: NORMAL
BH CV LOWER VASCULAR RIGHT COMMON FEMORAL COMPETENT: NORMAL
BH CV LOWER VASCULAR RIGHT COMMON FEMORAL COMPRESS: NORMAL
BH CV LOWER VASCULAR RIGHT COMMON FEMORAL PHASIC: NORMAL
BH CV LOWER VASCULAR RIGHT COMMON FEMORAL SPONT: NORMAL
BH CV LOWER VASCULAR RIGHT DISTAL FEMORAL COMPRESS: NORMAL
BH CV LOWER VASCULAR RIGHT GASTRONEMIUS COMPRESS: NORMAL
BH CV LOWER VASCULAR RIGHT GREATER SAPH AK COMPRESS: NORMAL
BH CV LOWER VASCULAR RIGHT GREATER SAPH BK COMPRESS: NORMAL
BH CV LOWER VASCULAR RIGHT MID FEMORAL AUGMENT: NORMAL
BH CV LOWER VASCULAR RIGHT MID FEMORAL COMPETENT: NORMAL
BH CV LOWER VASCULAR RIGHT MID FEMORAL COMPRESS: NORMAL
BH CV LOWER VASCULAR RIGHT MID FEMORAL PHASIC: NORMAL
BH CV LOWER VASCULAR RIGHT MID FEMORAL SPONT: NORMAL
BH CV LOWER VASCULAR RIGHT PERONEAL COMPRESS: NORMAL
BH CV LOWER VASCULAR RIGHT POPLITEAL AUGMENT: NORMAL
BH CV LOWER VASCULAR RIGHT POPLITEAL COMPETENT: NORMAL
BH CV LOWER VASCULAR RIGHT POPLITEAL COMPRESS: NORMAL
BH CV LOWER VASCULAR RIGHT POPLITEAL PHASIC: NORMAL
BH CV LOWER VASCULAR RIGHT POPLITEAL SPONT: NORMAL
BH CV LOWER VASCULAR RIGHT POSTERIOR TIBIAL COMPRESS: NORMAL
BH CV LOWER VASCULAR RIGHT PROFUNDA FEMORAL COMPRESS: NORMAL
BH CV LOWER VASCULAR RIGHT PROXIMAL FEMORAL COMPRESS: NORMAL
BH CV LOWER VASCULAR RIGHT SAPHENOFEMORAL JUNCTION COMPRESS: NORMAL
BUN BLD-MCNC: 10 MG/DL (ref 6–20)
BUN/CREAT SERPL: 13.3 (ref 7–25)
CALCIUM SPEC-SCNC: 8.4 MG/DL (ref 8.6–10.5)
CHLORIDE SERPL-SCNC: 96 MMOL/L (ref 98–107)
CO2 SERPL-SCNC: 24.1 MMOL/L (ref 22–29)
CREAT BLD-MCNC: 0.75 MG/DL (ref 0.76–1.27)
DEPRECATED RDW RBC AUTO: 41.7 FL (ref 37–54)
ERYTHROCYTE [DISTWIDTH] IN BLOOD BY AUTOMATED COUNT: 12.6 % (ref 12.3–15.4)
GFR SERPL CREATININE-BSD FRML MDRD: 109 ML/MIN/1.73
GLUCOSE BLD-MCNC: 103 MG/DL (ref 65–99)
GLUCOSE BLDC GLUCOMTR-MCNC: 102 MG/DL (ref 70–130)
GLUCOSE BLDC GLUCOMTR-MCNC: 129 MG/DL (ref 70–130)
GLUCOSE BLDC GLUCOMTR-MCNC: 137 MG/DL (ref 70–130)
GLUCOSE BLDC GLUCOMTR-MCNC: 156 MG/DL (ref 70–130)
HCT VFR BLD AUTO: 36.7 % (ref 37.5–51)
HGB BLD-MCNC: 12.8 G/DL (ref 13–17.7)
LIPASE SERPL-CCNC: 42 U/L (ref 13–60)
MCH RBC QN AUTO: 31.7 PG (ref 26.6–33)
MCHC RBC AUTO-ENTMCNC: 34.9 G/DL (ref 31.5–35.7)
MCV RBC AUTO: 90.8 FL (ref 79–97)
PLATELET # BLD AUTO: 203 10*3/MM3 (ref 140–450)
PMV BLD AUTO: 8.9 FL (ref 6–12)
POTASSIUM BLD-SCNC: 3.7 MMOL/L (ref 3.5–5.2)
RBC # BLD AUTO: 4.04 10*6/MM3 (ref 4.14–5.8)
SODIUM BLD-SCNC: 135 MMOL/L (ref 136–145)
WBC NRBC COR # BLD: 14.59 10*3/MM3 (ref 3.4–10.8)

## 2020-03-16 PROCEDURE — 99232 SBSQ HOSP IP/OBS MODERATE 35: CPT | Performed by: INTERNAL MEDICINE

## 2020-03-16 PROCEDURE — 25010000002 FUROSEMIDE PER 20 MG: Performed by: NURSE PRACTITIONER

## 2020-03-16 PROCEDURE — 85027 COMPLETE CBC AUTOMATED: CPT | Performed by: INTERNAL MEDICINE

## 2020-03-16 PROCEDURE — 25010000002 ENOXAPARIN PER 10 MG: Performed by: HOSPITALIST

## 2020-03-16 PROCEDURE — 63710000001 INSULIN LISPRO (HUMAN) PER 5 UNITS: Performed by: HOSPITALIST

## 2020-03-16 PROCEDURE — 25010000002 HYDRALAZINE PER 20 MG: Performed by: INTERNAL MEDICINE

## 2020-03-16 PROCEDURE — 80048 BASIC METABOLIC PNL TOTAL CA: CPT | Performed by: INTERNAL MEDICINE

## 2020-03-16 PROCEDURE — 83690 ASSAY OF LIPASE: CPT | Performed by: NURSE PRACTITIONER

## 2020-03-16 PROCEDURE — 93010 ELECTROCARDIOGRAM REPORT: CPT | Performed by: INTERNAL MEDICINE

## 2020-03-16 PROCEDURE — 93005 ELECTROCARDIOGRAM TRACING: CPT | Performed by: INTERNAL MEDICINE

## 2020-03-16 PROCEDURE — 82962 GLUCOSE BLOOD TEST: CPT

## 2020-03-16 PROCEDURE — 71045 X-RAY EXAM CHEST 1 VIEW: CPT

## 2020-03-16 PROCEDURE — 93970 EXTREMITY STUDY: CPT

## 2020-03-16 RX ORDER — BISACODYL 10 MG
10 SUPPOSITORY, RECTAL RECTAL DAILY PRN
Status: DISCONTINUED | OUTPATIENT
Start: 2020-03-16 | End: 2020-03-19 | Stop reason: HOSPADM

## 2020-03-16 RX ORDER — POLYETHYLENE GLYCOL 3350 17 G/17G
34 POWDER, FOR SOLUTION ORAL DAILY
Status: DISCONTINUED | OUTPATIENT
Start: 2020-03-17 | End: 2020-03-19 | Stop reason: HOSPADM

## 2020-03-16 RX ORDER — POTASSIUM CHLORIDE 750 MG/1
20 CAPSULE, EXTENDED RELEASE ORAL ONCE
Status: COMPLETED | OUTPATIENT
Start: 2020-03-16 | End: 2020-03-16

## 2020-03-16 RX ORDER — FUROSEMIDE 10 MG/ML
40 INJECTION INTRAMUSCULAR; INTRAVENOUS ONCE
Status: COMPLETED | OUTPATIENT
Start: 2020-03-16 | End: 2020-03-16

## 2020-03-16 RX ADMIN — POTASSIUM CHLORIDE 40 MEQ: 10 CAPSULE, COATED, EXTENDED RELEASE ORAL at 07:54

## 2020-03-16 RX ADMIN — FUROSEMIDE 40 MG: 10 INJECTION, SOLUTION INTRAMUSCULAR; INTRAVENOUS at 16:15

## 2020-03-16 RX ADMIN — POLYETHYLENE GLYCOL 3350 34 G: 17 POWDER, FOR SOLUTION ORAL at 07:54

## 2020-03-16 RX ADMIN — SODIUM CHLORIDE, PRESERVATIVE FREE 10 ML: 5 INJECTION INTRAVENOUS at 19:32

## 2020-03-16 RX ADMIN — METOPROLOL SUCCINATE 100 MG: 100 TABLET, FILM COATED, EXTENDED RELEASE ORAL at 07:53

## 2020-03-16 RX ADMIN — METOPROLOL SUCCINATE 100 MG: 100 TABLET, FILM COATED, EXTENDED RELEASE ORAL at 19:32

## 2020-03-16 RX ADMIN — HYDRALAZINE HYDROCHLORIDE 10 MG: 20 INJECTION INTRAMUSCULAR; INTRAVENOUS at 12:26

## 2020-03-16 RX ADMIN — OXYCODONE HYDROCHLORIDE AND ACETAMINOPHEN 1 TABLET: 7.5; 325 TABLET ORAL at 02:22

## 2020-03-16 RX ADMIN — AMIODARONE HYDROCHLORIDE 200 MG: 200 TABLET ORAL at 07:53

## 2020-03-16 RX ADMIN — SENNOSIDES AND DOCUSATE SODIUM 2 TABLET: 8.6; 5 TABLET ORAL at 19:32

## 2020-03-16 RX ADMIN — INSULIN LISPRO 2 UNITS: 100 INJECTION, SOLUTION INTRAVENOUS; SUBCUTANEOUS at 12:26

## 2020-03-16 RX ADMIN — AMIODARONE HYDROCHLORIDE 200 MG: 200 TABLET ORAL at 19:31

## 2020-03-16 RX ADMIN — LIDOCAINE 1 PATCH: 50 PATCH TOPICAL at 16:15

## 2020-03-16 RX ADMIN — ENOXAPARIN SODIUM 40 MG: 40 INJECTION SUBCUTANEOUS at 12:26

## 2020-03-16 RX ADMIN — SENNOSIDES AND DOCUSATE SODIUM 2 TABLET: 8.6; 5 TABLET ORAL at 07:53

## 2020-03-16 RX ADMIN — POTASSIUM CHLORIDE 20 MEQ: 10 CAPSULE, COATED, EXTENDED RELEASE ORAL at 16:15

## 2020-03-16 NOTE — PROGRESS NOTES
[facility]  Inpatient Progress Note    Reason for Follow Up: Acute pancreatitis    Subjective     Interval History:   Slow progress tolerating clears today    Current Facility-Administered Medications:   •  acetaminophen (TYLENOL) tablet 650 mg, 650 mg, Oral, Q4H PRN, 650 mg at 03/11/20 1359 **OR** acetaminophen (TYLENOL) 160 MG/5ML solution 650 mg, 650 mg, Oral, Q4H PRN **OR** acetaminophen (TYLENOL) suppository 650 mg, 650 mg, Rectal, Q4H PRN, Dedra Ruff APRN  •  amiodarone (PACERONE) tablet 200 mg, 200 mg, Oral, Q12H, Norm Card MD, 200 mg at 03/16/20 0753  •  bisacodyl (DULCOLAX) EC tablet 5 mg, 5 mg, Oral, Daily PRN, Dedra Ruff APRN, 5 mg at 03/09/20 2358  •  bisacodyl (DULCOLAX) suppository 10 mg, 10 mg, Rectal, Daily PRN, Emerita Elizabeth APRYESIKA  •  calcium carbonate (TUMS) chewable tablet 500 mg (200 mg elemental), 2 tablet, Oral, BID PRN, Dedra Ruff APRN, 2 tablet at 03/10/20 1133  •  cyclobenzaprine (FLEXERIL) tablet 10 mg, 10 mg, Oral, TID PRN, Kei Arnold MD, 10 mg at 03/15/20 1119  •  dextrose (D50W) 25 g/ 50mL Intravenous Solution 25 g, 25 g, Intravenous, Q15 Min PRN, Kei Arnold MD  •  dextrose (GLUTOSE) oral gel 15 g, 15 g, Oral, Q15 Min PRN, Kei Arnold MD  •  enoxaparin (LOVENOX) syringe 40 mg, 40 mg, Subcutaneous, Q24H, Kei Arnold MD, 40 mg at 03/16/20 1226  •  furosemide (LASIX) injection 40 mg, 40 mg, Intravenous, Once, Emerita Elizabeth APRN  •  glucagon (human recombinant) (GLUCAGEN DIAGNOSTIC) injection 1 mg, 1 mg, Subcutaneous, Q15 Min PRN, Kei Arnold MD  •  hydrALAZINE (APRESOLINE) injection 10 mg, 10 mg, Intravenous, Q6H PRN, Stepan Pacheco MD, 10 mg at 03/16/20 1226  •  HYDROmorphone (DILAUDID) injection 1 mg, 1 mg, Intravenous, Q2H PRN, Norberto Simon MD, 1 mg at 03/15/20 2340  •  insulin lispro (humaLOG) injection 0-7 Units, 0-7 Units, Subcutaneous, 4x Daily With Meals & Nightly, Kei Arnold  MD MICHAEL, 2 Units at 03/16/20 1226  •  lidocaine (LIDODERM) 5 % 1 patch, 1 patch, Transdermal, Q12H, Norberto Simon MD, 1 patch at 03/15/20 1540  •  metoprolol succinate XL (TOPROL-XL) 24 hr tablet 100 mg, 100 mg, Oral, BID, Malick, Gabby, APRN, 100 mg at 03/16/20 0753  •  metoprolol tartrate (LOPRESSOR) injection 5 mg, 5 mg, Intravenous, Q5 Min PRN, Gabby Teresa, APRN  •  nitroglycerin (NITROSTAT) SL tablet 0.4 mg, 0.4 mg, Sublingual, Q5 Min PRN, Dedra Ruff APRN  •  ondansetron (ZOFRAN) tablet 4 mg, 4 mg, Oral, Q6H PRN **OR** ondansetron (ZOFRAN) injection 4 mg, 4 mg, Intravenous, Q6H PRN, Dedra Ruff APRN, 4 mg at 03/11/20 0249  •  oxyCODONE-acetaminophen (PERCOCET) 7.5-325 MG per tablet 1 tablet, 1 tablet, Oral, Q4H PRN, Norberto Simon MD, 1 tablet at 03/16/20 0222  •  polyethylene glycol (MIRALAX) packet 17 g, 17 g, Oral, Daily PRN, Norberto Simon MD, 17 g at 03/10/20 1521  •  [START ON 3/17/2020] polyethylene glycol (MIRALAX) packet 34 g, 34 g, Oral, Daily, Emerita Elizabeth APRN  •  potassium chloride (MICRO-K) CR capsule 40 mEq, 40 mEq, Oral, PRN, 40 mEq at 03/12/20 1332 **OR** potassium chloride (KLOR-CON) packet 40 mEq, 40 mEq, Oral, PRN **OR** potassium chloride 10 mEq in 100 mL IVPB, 10 mEq, Intravenous, Q1H PRN, Norberto Simon MD  •  potassium chloride (MICRO-K) CR capsule 20 mEq, 20 mEq, Oral, Once, Emerita Elizabeth APRN  •  potassium chloride (MICRO-K) CR capsule 40 mEq, 40 mEq, Oral, Daily, Gabby Teresa, APRN, 40 mEq at 03/16/20 0754  •  promethazine (PHENERGAN) injection 12.5 mg, 12.5 mg, Intravenous, Q6H PRN, Kei Arnold MD, 12.5 mg at 03/08/20 1423  •  senna-docusate sodium (SENOKOT-S) 8.6-50 MG tablet 2 tablet, 2 tablet, Oral, BID, Tate Mckoy MD, 2 tablet at 03/16/20 0753  •  sodium chloride 0.9 % flush 10 mL, 10 mL, Intravenous, PRN, Charmaine Vogel MD  •  sodium chloride 0.9 % flush 10 mL, 10 mL, Intravenous,  Q12H, Dedra Ruff APRN, 10 mL at 03/15/20 2012  •  sodium chloride 0.9 % flush 10 mL, 10 mL, Intravenous, PRN, Dedra Ruff APRN, 10 mL at 03/14/20 1254  Review of Systems:    He has weakness and fatigue all other systems reviewed and negative    Objective     Vital Signs  Temp:  [98.2 °F (36.8 °C)-101.1 °F (38.4 °C)] 101.1 °F (38.4 °C)  Heart Rate:  [79-92] 80  Resp:  [18] 18  BP: (154-189)/(79-94) 184/90  Body mass index is 37.45 kg/m².    Intake/Output Summary (Last 24 hours) at 3/16/2020 1456  Last data filed at 3/16/2020 1327  Gross per 24 hour   Intake 540 ml   Output --   Net 540 ml     I/O this shift:  In: 480 [P.O.:480]  Out: -      Physical Exam:   General: patient awake, alert and cooperative   Eyes: Normal lids and lashes, no scleral icterus   Neck: supple, normal ROM   Skin: warm and dry, not jaundiced   Cardiovascular: regular rhythm and rate, no murmurs auscultated   Pulm: clear to auscultation bilaterally, regular and unlabored   Abdomen: soft, nontender, nondistended; normal bowel sounds   Extremities: no rash or edema   Psychiatric: Normal mood and behavior; memory intact     Results Review:     I reviewed the patient's new clinical results.    Results from last 7 days   Lab Units 03/16/20  0420 03/15/20  0608 03/14/20  0526   WBC 10*3/mm3 14.59* 13.77* 13.45*   HEMOGLOBIN g/dL 12.8* 12.6* 12.7*   HEMATOCRIT % 36.7* 36.0* 38.1   PLATELETS 10*3/mm3 203 214 224     Results from last 7 days   Lab Units 03/16/20  0420 03/15/20  0608 03/14/20  0526 03/13/20  0435  03/11/20  0816   SODIUM mmol/L 135* 133* 135* 139  --  137   POTASSIUM mmol/L 3.7 3.8 3.6 3.6   < > 3.9   CHLORIDE mmol/L 96* 99 98 102  --  99   CO2 mmol/L 24.1 23.5 25.0 24.9  --  27.6   BUN mg/dL 10 12 17 20  --  19   CREATININE mg/dL 0.75* 0.74* 0.76 0.82  --  0.78   CALCIUM mg/dL 8.4* 8.0* 8.3* 7.7*  --  7.8*   BILIRUBIN mg/dL  --  0.6  --  0.5  --  0.6   ALK PHOS U/L  --  66  --  56  --  60   ALT (SGPT) U/L  --  30  --   28  --  26   AST (SGOT) U/L  --  33  --  26  --  31   GLUCOSE mg/dL 103* 103* 134* 140*  --  143*    < > = values in this interval not displayed.         Lab Results   Lab Value Date/Time    LIPASE 39 03/10/2020 0558    LIPASE 170 (H) 03/09/2020 0427    LIPASE 561 (H) 03/08/2020 0600    LIPASE >3,000 (H) 03/07/2020 0401       Radiology:  CT Abdomen Pelvis With Contrast   Final Result   1. Findings consistent with moderately severe inflammatory change   surrounding the pancreas consistent with pancreatitis. This finding has   progressed since the previous study of 03/07/2020.   2. No pancreatic masses or pseudocysts are seen.   3. Small amount of free fluid in the abdomen and pelvis.   4. Status post cholecystectomy.   5. Small bilateral pleural effusions and bibasilar atelectasis.   6. Several small mesenteric nodes. These could be reassessed on a   follow-up study.               Radiation dose reduction techniques were utilized, including automated   exposure control and exposure modulation based on body size.       This report was finalized on 3/12/2020 8:36 AM by Dr. Kilo Owens M.D.          XR Chest PA & Lateral   Final Result   Small likely atelectasis or scarring.       This report was finalized on 3/9/2020 2:16 PM by Dr. Roger Howe M.D.          MRI abdomen w wo contrast mrcp   Final Result   Markedly limited exam as the patient could not cooperate   with breath-hold procedure. Both pre and postcontrast images were   markedly limited.   MR findings of acute pancreatitis. Status post cholecystectomy. No   evidence of choledocholithiasis. No dilatation of the pancreatic duct.   Additional details as above.       This report was finalized on 3/9/2020 12:47 PM by Dr. Bernadine Atwood M.D.          CT Abdomen Pelvis With Contrast   Final Result       1. Inflammatory stranding is seen surrounding the pancreas, suspicious   for pancreatitis. While there is some fluid identified within the upper    abdomen, no organized peripancreatic collections are seen. The pancreas   enhances normally.   2. Area of decreased attenuation involving the inferior aspect of the   spleen. Small splenic infarction is not excluded.       Radiation dose reduction techniques were utilized, including automated   exposure control and exposure modulation based on body size.       This report was finalized on 3/7/2020 5:42 AM by Dr. Brianna Soliz M.D.          XR Chest 1 View    (Results Pending)       Assessment/Plan     Patient Active Problem List   Diagnosis   • Acute pancreatitis   • Essential hypertension   • Hyperglycemia   • Sleep apnea   • Hypercholesteremia   • Asthma   • Atrial fibrillation (CMS/HCC)   • Back pain   • Morbid obesity (CMS/HCC)   • Acute constipation   • Fever       Assessment:   Problem List:     Acute pancreatitis    Essential hypertension    Hyperglycemia    Sleep apnea    Hypercholesteremia    Asthma    Atrial fibrillation (CMS/HCC)    Back pain    Morbid obesity (CMS/HCC)    Acute constipation     Patient making some progress now.     Plan:   Continue clears     If he fails this trial, he understands that he will need to go on nasoenteric feeding for a while.  Follow lipase  I discussed the patients findings and my recommendations with patient and nursing staff.    Arnel Still MD

## 2020-03-16 NOTE — PLAN OF CARE
Problem: Patient Care Overview  Goal: Plan of Care Review  Outcome: Ongoing (interventions implemented as appropriate)  Flowsheets  Taken 3/15/2020 0329 by Shankar Almaguer, RN  Progress: no change  Taken 3/16/2020 0008 by Mary Roy, RN  Plan of Care Reviewed With: patient  Taken 3/16/2020 0410 by Mary Roy, RN  Outcome Summary: Pt A&O. Complaints of constant pain to left lower abd. Medicated per MAR. Vital signs stable. BP monitored. SR on monitor. Up ad gaye. No acute distress noted. Will continue to monitor.     Problem: Pain, Chronic (Adult)  Goal: Acceptable Pain/Comfort Level and Functional Ability  Outcome: Ongoing (interventions implemented as appropriate)     Problem: Patient Care Overview  Goal: Individualization and Mutuality  Outcome: Ongoing (interventions implemented as appropriate)     Problem: Patient Care Overview  Goal: Discharge Needs Assessment  Outcome: Ongoing (interventions implemented as appropriate)     Problem: Patient Care Overview  Goal: Interprofessional Rounds/Family Conf  Outcome: Ongoing (interventions implemented as appropriate)     Problem: Pancreatitis, Acute/Chronic (Adult)  Goal: Signs and Symptoms of Listed Potential Problems Will be Absent, Minimized or Managed (Pancreatitis, Acute/Chronic)  Outcome: Ongoing (interventions implemented as appropriate)     Problem: Arrhythmia/Dysrhythmia (Symptomatic) (Adult)  Goal: Signs and Symptoms of Listed Potential Problems Will be Absent, Minimized or Managed (Arrhythmia/Dysrhythmia)  Outcome: Ongoing (interventions implemented as appropriate)

## 2020-03-16 NOTE — PROGRESS NOTES
"CC: PAF    Interval History:   Feels better concerned about his blood pressure    Vital Signs  Temp:  [98.2 °F (36.8 °C)-99.9 °F (37.7 °C)] 98.6 °F (37 °C)  Heart Rate:  [79-92] 79  Resp:  [18] 18  BP: (154-189)/(79-94) 180/85    Intake/Output Summary (Last 24 hours) at 3/16/2020 0816  Last data filed at 3/16/2020 0637  Gross per 24 hour   Intake 60 ml   Output --   Net 60 ml     Flowsheet Rows      First Filed Value   Admission Height  180.3 cm (71\") Documented at 03/07/2020 0210   Admission Weight  122 kg (268 lb 8 oz) Documented at 03/07/2020 0226          PHYSICAL EXAM:  General: No acute distress  Resp:NL Rate, unlabored, clear  CV:NL rate and rhythm, NL PMI, Nl S1 and S2, no Murmur, no gallop, no rub, No JVD. Normal pedal pulses  ABD:Nl sounds, no masses or tenderness, nondistended, no guarding or rebound  Neuro: alert,cooperative and oriented  Extr: No edema or cyanosis, moves all extremities      Results Review:    Results from last 7 days   Lab Units 03/16/20  0420   SODIUM mmol/L 135*   POTASSIUM mmol/L 3.7   CHLORIDE mmol/L 96*   CO2 mmol/L 24.1   BUN mg/dL 10   CREATININE mg/dL 0.75*   GLUCOSE mg/dL 103*   CALCIUM mg/dL 8.4*         Results from last 7 days   Lab Units 03/16/20  0420   WBC 10*3/mm3 14.59*   HEMOGLOBIN g/dL 12.8*   HEMATOCRIT % 36.7*   PLATELETS 10*3/mm3 203             Results from last 7 days   Lab Units 03/12/20  0359   MAGNESIUM mg/dL 2.5         I reviewed the patient's new clinical results.  I personally viewed and interpreted the patient's EKG/Telemetry data        Medication Review:   Meds reviewed      lactated ringers 75 mL/hr Last Rate: 75 mL/hr (03/14/20 9599)       Assessment/Plan  1.  54-year-old gentleman with paroxysmal atrial fibrillation, normal left ventricular systolic function on echo August 2017.  [id number] score is 1.  Back in sinus rhythm on amiodarone we will check an ECG.  From our standpoint would keep him off anticoagulation as long as he remains in sinus " rhythm.  2.  Acute pancreatitis GI following to try clear liquids  3.  Hypertension follow up pressure elevated per hospitalist.  4.  Obstructive sleep apnea has home pap here  5.   Normal perfusion stress test August 2017          Baldomero Velasquez MD  03/16/20  08:16

## 2020-03-16 NOTE — PLAN OF CARE
No complications, pt ambulated x3, no pain meds, no complaints  Problem: Pain, Chronic (Adult)  Goal: Acceptable Pain/Comfort Level and Functional Ability  Outcome: Ongoing (interventions implemented as appropriate)     Problem: Patient Care Overview  Goal: Plan of Care Review  Outcome: Ongoing (interventions implemented as appropriate)  Goal: Individualization and Mutuality  Outcome: Ongoing (interventions implemented as appropriate)  Goal: Discharge Needs Assessment  Outcome: Ongoing (interventions implemented as appropriate)  Goal: Interprofessional Rounds/Family Conf  Outcome: Ongoing (interventions implemented as appropriate)     Problem: Pancreatitis, Acute/Chronic (Adult)  Goal: Signs and Symptoms of Listed Potential Problems Will be Absent, Minimized or Managed (Pancreatitis, Acute/Chronic)  Outcome: Ongoing (interventions implemented as appropriate)     Problem: Arrhythmia/Dysrhythmia (Symptomatic) (Adult)  Goal: Signs and Symptoms of Listed Potential Problems Will be Absent, Minimized or Managed (Arrhythmia/Dysrhythmia)  Outcome: Ongoing (interventions implemented as appropriate)

## 2020-03-16 NOTE — PROGRESS NOTES
Progress Note    Name: Cesar Vann ADMIT: 3/7/2020   : 1965  PCP: Bing Eduardo APRN    MRN: [id number] LOS: 9 days   AGE/SEX: 54 y.o. male  ROOM: [facility]/   Date of Encounter Visit: 20    Subjective:     Interval History: pain improved today and tolerating diet.    REVIEW OF SYSTEMS:   No chills. Spiked a temp this afternoon.   No chest pain, palpitations. Edema.    soa a little better today. No cough.   No nausea, vomiting. Abdominal pain improved. Abdominal distention. Diarrhea yesterday and today.     Objective:   Temp:  [98.2 °F (36.8 °C)-99.9 °F (37.7 °C)] 98.6 °F (37 °C)  Heart Rate:  [79-92] 79  Resp:  [18] 18  BP: (154-189)/(79-94) 180/85   SpO2:  [97 %-99 %] 98 %  on    Device (Oxygen Therapy): CPAP    Intake/Output Summary (Last 24 hours) at 3/16/2020 1300  Last data filed at 3/16/2020 0902  Gross per 24 hour   Intake 180 ml   Output --   Net 180 ml     Body mass index is 37.45 kg/m².      20  0226   Weight: 122 kg (268 lb 8 oz)     Weight change:     Physical Exam   Constitutional: He is oriented to person, place, and time. He appears well-developed and well-nourished. No distress.   Morbidly obese   HENT:   Head: Normocephalic and atraumatic.   Mouth/Throat: No oropharyngeal exudate.   Eyes: Conjunctivae are normal. No scleral icterus.   Neck: Neck supple.   Cardiovascular: Normal rate, regular rhythm and intact distal pulses.  Occasional extrasystoles are present.   No murmur heard.  Pulmonary/Chest: Effort normal and breath sounds normal. No respiratory distress. He has no wheezes.   Abdominal: Bowel sounds are normal. He exhibits distension. There is tenderness.   Musculoskeletal: He exhibits edema (2+).   Neurological: He is alert and oriented to person, place, and time.   Skin: Skin is warm and dry. No rash noted.   Psychiatric: He has a normal mood and affect.   Vitals reviewed.      Results Review:      Results from last 7 days   Lab Units 20  0420  03/15/20  0608 03/14/20  0526 03/13/20  0435 03/12/20  0359 03/11/20  0816 03/10/20  1625 03/10/20  0558   SODIUM mmol/L 135* 133* 135* 139  --  137  --  139   POTASSIUM mmol/L 3.7 3.8 3.6 3.6 3.1* 3.9 3.9 3.3*   CHLORIDE mmol/L 96* 99 98 102  --  99  --  103   CO2 mmol/L 24.1 23.5 25.0 24.9  --  27.6  --  27.0   BUN mg/dL 10 12 17 20  --  19  --  26*   CREATININE mg/dL 0.75* 0.74* 0.76 0.82  --  0.78  --  0.89   GLUCOSE mg/dL 103* 103* 134* 140*  --  143*  --  139*   CALCIUM mg/dL 8.4* 8.0* 8.3* 7.7*  --  7.8*  --  7.8*   AST (SGOT) U/L  --  33  --  26  --  31  --   --    ALT (SGPT) U/L  --  30  --  28  --  26  --   --      Estimated Creatinine Clearance: 149.7 mL/min (A) (by C-G formula based on SCr of 0.75 mg/dL (L)).      Results from last 7 days   Lab Units 03/16/20  1116 03/16/20  0613 03/15/20  2015 03/15/20  1707 03/15/20  1207 03/15/20  0643 03/14/20  2101 03/14/20  1629   GLUCOSE mg/dL 156* 102 105 125 94 92 128 114                 Results from last 7 days   Lab Units 03/12/20  0359   MAGNESIUM mg/dL 2.5           Invalid input(s): LDLCALC  Results from last 7 days   Lab Units 03/16/20  0420 03/15/20  0608 03/14/20  0526 03/13/20  0435 03/11/20  0556 03/10/20  0558   WBC 10*3/mm3 14.59* 13.77* 13.45* 10.21 12.83* 12.33*   HEMOGLOBIN g/dL 12.8* 12.6* 12.7* 12.2* 13.6 13.2   HEMATOCRIT % 36.7* 36.0* 38.1 36.3* 37.6 37.9   PLATELETS 10*3/mm3 203 214 224 183 139* 154   MCV fL 90.8 90.7 91.8 89.9 88.9 89.6   MCH pg 31.7 31.7 30.6 30.2 32.2 31.2   MCHC g/dL 34.9 35.0 33.3 33.6 36.2* 34.8   RDW % 12.6 12.6 12.9 12.7 13.1 12.7   RDW-SD fl 41.7 42.1 42.8 42.1 42.5 40.9   MPV fL 8.9 8.6 8.8 9.0 10.7 9.5   NEUTROPHIL % %  --   --   --  74.0  --   --    LYMPHOCYTE % %  --   --   --  9.7*  --   --    MONOCYTES % %  --   --   --  11.5  --   --    EOSINOPHIL % %  --   --   --  2.2  --   --    BASOPHIL % %  --   --   --  0.5  --   --    NEUTROS ABS 10*3/mm3  --   --   --  7.57* 10.52*  --    LYMPHS ABS 10*3/mm3  --   --    --  0.99  --   --    MONOS ABS 10*3/mm3  --   --   --  1.17*  --   --    EOS ABS 10*3/mm3  --   --   --  0.22  --   --    BASOS ABS 10*3/mm3  --   --   --  0.05  --   --                      Results from last 7 days   Lab Units 03/10/20  0558   LIPASE U/L 39                       Imaging:  Imaging Results (Last 24 Hours)     ** No results found for the last 24 hours. **          I reviewed the patient's new clinical results and medications.         Medication Review:   Scheduled Meds:  amiodarone 200 mg Oral Q12H   bisacodyl 10 mg Rectal Daily   enoxaparin 40 mg Subcutaneous Q24H   insulin lispro 0-7 Units Subcutaneous 4x Daily With Meals & Nightly   lidocaine 1 patch Transdermal Q12H   metoprolol succinate  mg Oral BID   polyethylene glycol 34 g Oral BID   potassium chloride 40 mEq Oral Daily   senna-docusate sodium 2 tablet Oral BID   sodium chloride 10 mL Intravenous Q12H     Continuous Infusions:  lactated ringers 75 mL/hr Last Rate: 75 mL/hr (03/14/20 224)     PRN Meds:.•  acetaminophen **OR** acetaminophen **OR** acetaminophen  •  bisacodyl  •  calcium carbonate  •  cyclobenzaprine  •  dextrose  •  dextrose  •  glucagon (human recombinant)  •  hydrALAZINE  •  HYDROmorphone  •  metoprolol tartrate  •  nitroglycerin  •  ondansetron **OR** ondansetron  •  oxyCODONE-acetaminophen  •  polyethylene glycol  •  potassium chloride **OR** potassium chloride **OR** potassium chloride  •  promethazine  •  sodium chloride  •  sodium chloride    Problem List:     Active Hospital Problems    Diagnosis  POA   • **Acute pancreatitis [K85.90]  Yes   • Acute constipation [K59.00]  Unknown   • Atrial fibrillation (CMS/HCC) [I48.91]  Unknown   • Back pain [M54.9]  Unknown   • Morbid obesity (CMS/HCC) [E66.01]  Unknown   • Essential hypertension [I10]  Unknown   • Hyperglycemia [R73.9]  Unknown   • Sleep apnea [G47.30]  Unknown   • Hypercholesteremia [E78.00]  Unknown   • Asthma [J45.909]  Unknown      Resolved Hospital  Problems   No resolved problems to display.       Assessment and Plan:     Pancreatitis  -Repeat CT of the abdomen pelvis 3/11 showed worsening inflammation associated with his pancreatitis and small mesenteric nodes  -IgG ok. Ca 19-9 mildly elevated. Triglycerides normal. No ductile abnormalities on MRCP  -tolerating clear liquids  - PRN pain meds  -repeat lipase     Constipation  -adjust bowel regimen given diarrhea.     Afib  -cardiology following. Back in NSR, rate controlled on Toprol  -[id number] score is 1. Currently holding on anticoagulation    HTN  - BP labile and likely worsened by pain.   -will monitor  -give another dose of IV lasix with potassium for volume overload    RYNE  -home CPAP    Fever  -new onset. WBC increasing   -check blood cultures  -PRN tylenol  -venous doppler to rule out DVT although less likely given that he is on VTE prophylaxis  -encourage IS use and check CXR  - cbc with diff in am       VTE prophylaxis: Lovenox 40 mg SC daily  CODE status: full code  Disposition: TBD    I discussed the patients findings and my recommendations with patient.    Emerita Elizabeth, APRN  03/16/20

## 2020-03-16 NOTE — PROGRESS NOTES
Continued Stay Note  [facility]     Patient Name: Cesar Vann  MRN: [id number]  Today's Date: 3/16/2020    Admit Date: 3/7/2020    Discharge Plan     Row Name 03/16/20 1006       Plan    Plan  return home- CCP will follow for needs    Plan Comments  Spoke with patient at bedside.  He denies any needs and plans to return home.  Patient seen ambulating multiple labs around the nurses station.  CCP will follow as needed. Angle Patton RN        Discharge Codes    No documentation.             Angle Patton RN

## 2020-03-17 ENCOUNTER — APPOINTMENT (OUTPATIENT)
Dept: CARDIOLOGY | Facility: HOSPITAL | Age: 55
End: 2020-03-17

## 2020-03-17 LAB
ANION GAP SERPL CALCULATED.3IONS-SCNC: 10.9 MMOL/L (ref 5–15)
BASOPHILS # BLD AUTO: 0.04 10*3/MM3 (ref 0–0.2)
BASOPHILS NFR BLD AUTO: 0.3 % (ref 0–1.5)
BUN BLD-MCNC: 10 MG/DL (ref 6–20)
BUN/CREAT SERPL: 11.1 (ref 7–25)
CALCIUM SPEC-SCNC: 8.2 MG/DL (ref 8.6–10.5)
CHLORIDE SERPL-SCNC: 95 MMOL/L (ref 98–107)
CO2 SERPL-SCNC: 26.1 MMOL/L (ref 22–29)
CREAT BLD-MCNC: 0.9 MG/DL (ref 0.76–1.27)
DEPRECATED RDW RBC AUTO: 40.3 FL (ref 37–54)
EOSINOPHIL # BLD AUTO: 0.15 10*3/MM3 (ref 0–0.4)
EOSINOPHIL NFR BLD AUTO: 1.3 % (ref 0.3–6.2)
ERYTHROCYTE [DISTWIDTH] IN BLOOD BY AUTOMATED COUNT: 12.3 % (ref 12.3–15.4)
GFR SERPL CREATININE-BSD FRML MDRD: 88 ML/MIN/1.73
GLUCOSE BLD-MCNC: 129 MG/DL (ref 65–99)
GLUCOSE BLDC GLUCOMTR-MCNC: 118 MG/DL (ref 70–130)
GLUCOSE BLDC GLUCOMTR-MCNC: 128 MG/DL (ref 70–130)
GLUCOSE BLDC GLUCOMTR-MCNC: 140 MG/DL (ref 70–130)
GLUCOSE BLDC GLUCOMTR-MCNC: 157 MG/DL (ref 70–130)
HCT VFR BLD AUTO: 36.4 % (ref 37.5–51)
HGB BLD-MCNC: 12.6 G/DL (ref 13–17.7)
IMM GRANULOCYTES # BLD AUTO: 0.23 10*3/MM3 (ref 0–0.05)
IMM GRANULOCYTES NFR BLD AUTO: 2 % (ref 0–0.5)
LYMPHOCYTES # BLD AUTO: 1.3 10*3/MM3 (ref 0.7–3.1)
LYMPHOCYTES NFR BLD AUTO: 11.2 % (ref 19.6–45.3)
MCH RBC QN AUTO: 31.3 PG (ref 26.6–33)
MCHC RBC AUTO-ENTMCNC: 34.6 G/DL (ref 31.5–35.7)
MCV RBC AUTO: 90.3 FL (ref 79–97)
MONOCYTES # BLD AUTO: 0.97 10*3/MM3 (ref 0.1–0.9)
MONOCYTES NFR BLD AUTO: 8.3 % (ref 5–12)
NEUTROPHILS # BLD AUTO: 8.95 10*3/MM3 (ref 1.7–7)
NEUTROPHILS NFR BLD AUTO: 76.9 % (ref 42.7–76)
NRBC BLD AUTO-RTO: 0 /100 WBC (ref 0–0.2)
NT-PROBNP SERPL-MCNC: 144.2 PG/ML (ref 5–900)
PLATELET # BLD AUTO: 221 10*3/MM3 (ref 140–450)
PMV BLD AUTO: 8.7 FL (ref 6–12)
POTASSIUM BLD-SCNC: 4 MMOL/L (ref 3.5–5.2)
RBC # BLD AUTO: 4.03 10*6/MM3 (ref 4.14–5.8)
SODIUM BLD-SCNC: 132 MMOL/L (ref 136–145)
WBC NRBC COR # BLD: 11.64 10*3/MM3 (ref 3.4–10.8)

## 2020-03-17 PROCEDURE — 83880 ASSAY OF NATRIURETIC PEPTIDE: CPT | Performed by: INTERNAL MEDICINE

## 2020-03-17 PROCEDURE — 93971 EXTREMITY STUDY: CPT

## 2020-03-17 PROCEDURE — 25010000002 FUROSEMIDE PER 20 MG: Performed by: NURSE PRACTITIONER

## 2020-03-17 PROCEDURE — 85025 COMPLETE CBC W/AUTO DIFF WBC: CPT | Performed by: NURSE PRACTITIONER

## 2020-03-17 PROCEDURE — 99232 SBSQ HOSP IP/OBS MODERATE 35: CPT | Performed by: NURSE PRACTITIONER

## 2020-03-17 PROCEDURE — 82962 GLUCOSE BLOOD TEST: CPT

## 2020-03-17 PROCEDURE — 63710000001 INSULIN LISPRO (HUMAN) PER 5 UNITS: Performed by: HOSPITALIST

## 2020-03-17 PROCEDURE — 25010000002 ENOXAPARIN PER 10 MG: Performed by: HOSPITALIST

## 2020-03-17 PROCEDURE — 80048 BASIC METABOLIC PNL TOTAL CA: CPT | Performed by: INTERNAL MEDICINE

## 2020-03-17 PROCEDURE — 99232 SBSQ HOSP IP/OBS MODERATE 35: CPT | Performed by: INTERNAL MEDICINE

## 2020-03-17 RX ORDER — POTASSIUM CHLORIDE 1.5 G/1.77G
40 POWDER, FOR SOLUTION ORAL AS NEEDED
Status: DISCONTINUED | OUTPATIENT
Start: 2020-03-17 | End: 2020-03-18 | Stop reason: SDUPTHER

## 2020-03-17 RX ORDER — FUROSEMIDE 10 MG/ML
40 INJECTION INTRAMUSCULAR; INTRAVENOUS EVERY 12 HOURS
Status: COMPLETED | OUTPATIENT
Start: 2020-03-17 | End: 2020-03-18

## 2020-03-17 RX ORDER — POTASSIUM CHLORIDE 7.45 MG/ML
10 INJECTION INTRAVENOUS
Status: DISCONTINUED | OUTPATIENT
Start: 2020-03-17 | End: 2020-03-18 | Stop reason: SDUPTHER

## 2020-03-17 RX ORDER — POTASSIUM CHLORIDE 750 MG/1
20 CAPSULE, EXTENDED RELEASE ORAL ONCE
Status: COMPLETED | OUTPATIENT
Start: 2020-03-17 | End: 2020-03-17

## 2020-03-17 RX ORDER — AMIODARONE HYDROCHLORIDE 200 MG/1
200 TABLET ORAL
Status: DISCONTINUED | OUTPATIENT
Start: 2020-03-17 | End: 2020-03-19 | Stop reason: HOSPADM

## 2020-03-17 RX ORDER — POTASSIUM CHLORIDE 750 MG/1
40 CAPSULE, EXTENDED RELEASE ORAL AS NEEDED
Status: DISCONTINUED | OUTPATIENT
Start: 2020-03-17 | End: 2020-03-18 | Stop reason: SDUPTHER

## 2020-03-17 RX ORDER — FUROSEMIDE 10 MG/ML
40 INJECTION INTRAMUSCULAR; INTRAVENOUS ONCE
Status: DISCONTINUED | OUTPATIENT
Start: 2020-03-17 | End: 2020-03-17

## 2020-03-17 RX ADMIN — SENNOSIDES AND DOCUSATE SODIUM 2 TABLET: 8.6; 5 TABLET ORAL at 20:03

## 2020-03-17 RX ADMIN — CYCLOBENZAPRINE 10 MG: 10 TABLET, FILM COATED ORAL at 00:43

## 2020-03-17 RX ADMIN — SODIUM CHLORIDE, PRESERVATIVE FREE 10 ML: 5 INJECTION INTRAVENOUS at 20:04

## 2020-03-17 RX ADMIN — FUROSEMIDE 40 MG: 10 INJECTION, SOLUTION INTRAMUSCULAR; INTRAVENOUS at 18:30

## 2020-03-17 RX ADMIN — METOPROLOL SUCCINATE 100 MG: 100 TABLET, FILM COATED, EXTENDED RELEASE ORAL at 20:03

## 2020-03-17 RX ADMIN — SENNOSIDES AND DOCUSATE SODIUM 2 TABLET: 8.6; 5 TABLET ORAL at 08:35

## 2020-03-17 RX ADMIN — AMIODARONE HYDROCHLORIDE 200 MG: 200 TABLET ORAL at 08:35

## 2020-03-17 RX ADMIN — POLYETHYLENE GLYCOL 3350 34 G: 17 POWDER, FOR SOLUTION ORAL at 08:35

## 2020-03-17 RX ADMIN — OXYCODONE HYDROCHLORIDE AND ACETAMINOPHEN 1 TABLET: 7.5; 325 TABLET ORAL at 00:43

## 2020-03-17 RX ADMIN — POTASSIUM CHLORIDE 20 MEQ: 750 CAPSULE, EXTENDED RELEASE ORAL at 18:30

## 2020-03-17 RX ADMIN — ENOXAPARIN SODIUM 40 MG: 40 INJECTION SUBCUTANEOUS at 15:53

## 2020-03-17 RX ADMIN — INSULIN LISPRO 2 UNITS: 100 INJECTION, SOLUTION INTRAVENOUS; SUBCUTANEOUS at 21:44

## 2020-03-17 RX ADMIN — ANTACID TABLETS 2 TABLET: 500 TABLET, CHEWABLE ORAL at 00:48

## 2020-03-17 RX ADMIN — METOPROLOL SUCCINATE 100 MG: 100 TABLET, FILM COATED, EXTENDED RELEASE ORAL at 08:36

## 2020-03-17 RX ADMIN — POTASSIUM CHLORIDE 40 MEQ: 10 CAPSULE, COATED, EXTENDED RELEASE ORAL at 08:36

## 2020-03-17 RX ADMIN — LIDOCAINE 1 PATCH: 50 PATCH TOPICAL at 15:53

## 2020-03-17 NOTE — PROGRESS NOTES
Progress Note    Name: Cesar Vann ADMIT: 3/7/2020   : 1965  PCP: Bing Eduardo APRN    MRN: [id number] LOS: 10 days   AGE/SEX: 54 y.o. male  ROOM: [facility]   Date of Encounter Visit: 20    Subjective:     Interval History: abdominal pain improved.  Tolerating clear liquids and advance to full.  Feels edematous with edema in his abdomen causing him to feel short of breath and bilateral lower extremities    REVIEW OF SYSTEMS:   No chills. Fever yesterday tmax 101.1. Today tmax 99.1  No chest pain, palpitations. Edema.    soa today. No cough.   No nausea, vomiting. Abdominal pain improved. Abdominal distention.     Objective:   Temp:  [99.1 °F (37.3 °C)-99.9 °F (37.7 °C)] 99.9 °F (37.7 °C)  Heart Rate:  [75-85] 81  Resp:  [16-18] 16  BP: (152-176)/(74-96) 164/78   SpO2:  [96 %-97 %] 97 %  on    Device (Oxygen Therapy): room air    Intake/Output Summary (Last 24 hours) at 3/17/2020 1612  Last data filed at 3/17/2020 1353  Gross per 24 hour   Intake 690 ml   Output --   Net 690 ml     Body mass index is 38.48 kg/m².      20  0226 20  1353   Weight: 122 kg (268 lb 8 oz) 125 kg (275 lb 12.8 oz)     Weight change:     Physical Exam   Constitutional: He is oriented to person, place, and time. He appears well-developed and well-nourished. No distress.   Morbidly obese   HENT:   Head: Normocephalic and atraumatic.   Mouth/Throat: No oropharyngeal exudate.   Eyes: Conjunctivae are normal. No scleral icterus.   Neck: Neck supple.   Cardiovascular: Normal rate, regular rhythm and intact distal pulses.  Occasional extrasystoles are present.   No murmur heard.  Pulmonary/Chest: Effort normal and breath sounds normal. No respiratory distress. He has no wheezes.   Abdominal: Bowel sounds are normal. He exhibits distension. There is tenderness.   Abdominal wall edema   Musculoskeletal: He exhibits edema (2+).   Neurological: He is alert and oriented to person, place, and time.   Skin: Skin is  warm and dry. No rash noted.   Right forearm anterior  At prior IV site with induration warmth and erythema   Psychiatric: He has a normal mood and affect.   Vitals reviewed.      Results Review:      Results from last 7 days   Lab Units 03/17/20  1415 03/16/20  0420 03/15/20  0608 03/14/20  0526 03/13/20  0435 03/12/20  0359 03/11/20  0816   SODIUM mmol/L 132* 135* 133* 135* 139  --  137   POTASSIUM mmol/L 4.0 3.7 3.8 3.6 3.6 3.1* 3.9   CHLORIDE mmol/L 95* 96* 99 98 102  --  99   CO2 mmol/L 26.1 24.1 23.5 25.0 24.9  --  27.6   BUN mg/dL 10 10 12 17 20  --  19   CREATININE mg/dL 0.90 0.75* 0.74* 0.76 0.82  --  0.78   GLUCOSE mg/dL 129* 103* 103* 134* 140*  --  143*   CALCIUM mg/dL 8.2* 8.4* 8.0* 8.3* 7.7*  --  7.8*   AST (SGOT) U/L  --   --  33  --  26  --  31   ALT (SGPT) U/L  --   --  30  --  28  --  26     Estimated Creatinine Clearance: 126.3 mL/min (by C-G formula based on SCr of 0.9 mg/dL).      Results from last 7 days   Lab Units 03/17/20  1116 03/17/20  0621 03/16/20  2036 03/16/20  1613 03/16/20  1116 03/16/20  0613 03/15/20  2015 03/15/20  1707   GLUCOSE mg/dL 128 118 137* 129 156* 102 105 125         Results from last 7 days   Lab Units 03/17/20  1415   PROBNP pg/mL 144.2         Results from last 7 days   Lab Units 03/12/20  0359   MAGNESIUM mg/dL 2.5           Invalid input(s): LDLCALC  Results from last 7 days   Lab Units 03/17/20  0621 03/16/20  0420 03/15/20  0608 03/14/20  0526 03/13/20  0435  03/11/20  0556   WBC 10*3/mm3 11.64* 14.59* 13.77* 13.45* 10.21  --  12.83*   HEMOGLOBIN g/dL 12.6* 12.8* 12.6* 12.7* 12.2*  --  13.6   HEMATOCRIT % 36.4* 36.7* 36.0* 38.1 36.3*  --  37.6   PLATELETS 10*3/mm3 221 203 214 224 183  --  139*   MCV fL 90.3 90.8 90.7 91.8 89.9  --  88.9   MCH pg 31.3 31.7 31.7 30.6 30.2  --  32.2   MCHC g/dL 34.6 34.9 35.0 33.3 33.6  --  36.2*   RDW % 12.3 12.6 12.6 12.9 12.7  --  13.1   RDW-SD fl 40.3 41.7 42.1 42.8 42.1  --  42.5   MPV fL 8.7 8.9 8.6 8.8 9.0  --  10.7      NEUTROPHIL % % 76.9*  --   --   --  74.0   < >  --    LYMPHOCYTE % % 11.2*  --   --   --  9.7*   < >  --    MONOCYTES % % 8.3  --   --   --  11.5   < >  --    EOSINOPHIL % % 1.3  --   --   --  2.2   < >  --    BASOPHIL % % 0.3  --   --   --  0.5   < >  --    IMM GRAN % % 2.0*  --   --   --   --   --   --    NEUTROS ABS 10*3/mm3 8.95*  --   --   --  7.57*   < > 10.52*   LYMPHS ABS 10*3/mm3 1.30  --   --   --  0.99   < >  --    MONOS ABS 10*3/mm3 0.97*  --   --   --  1.17*   < >  --    EOS ABS 10*3/mm3 0.15  --   --   --  0.22   < >  --    BASOS ABS 10*3/mm3 0.04  --   --   --  0.05   < >  --    IMMATURE GRANS (ABS) 10*3/mm3 0.23*  --   --   --   --   --   --    NRBC /100 WBC 0.0  --   --   --   --   --   --     < > = values in this interval not displayed.                     Results from last 7 days   Lab Units 03/16/20  0420   LIPASE U/L 42                       Imaging:  Imaging Results (Last 24 Hours)     ** No results found for the last 24 hours. **          I reviewed the patient's new clinical results and medications.         Medication Review:   Scheduled Meds:    amiodarone 200 mg Oral Q24H   enoxaparin 40 mg Subcutaneous Q24H   insulin lispro 0-7 Units Subcutaneous 4x Daily With Meals & Nightly   lidocaine 1 patch Transdermal Q12H   metoprolol succinate  mg Oral BID   polyethylene glycol 34 g Oral Daily   potassium chloride 40 mEq Oral Daily   senna-docusate sodium 2 tablet Oral BID   sodium chloride 10 mL Intravenous Q12H     Continuous Infusions:   PRN Meds:.•  acetaminophen **OR** acetaminophen **OR** acetaminophen  •  bisacodyl  •  bisacodyl  •  calcium carbonate  •  cyclobenzaprine  •  dextrose  •  dextrose  •  glucagon (human recombinant)  •  hydrALAZINE  •  metoprolol tartrate  •  nitroglycerin  •  ondansetron **OR** ondansetron  •  oxyCODONE-acetaminophen  •  polyethylene glycol  •  potassium chloride **OR** potassium chloride **OR** potassium chloride  •  promethazine  •  sodium chloride  •   sodium chloride    Problem List:     Active Hospital Problems    Diagnosis  POA   • **Acute pancreatitis [K85.90]  Yes   • Fever [R50.9]  Unknown   • Acute constipation [K59.00]  Unknown   • Atrial fibrillation (CMS/HCC) [I48.91]  Unknown   • Back pain [M54.9]  Unknown   • Morbid obesity (CMS/HCC) [E66.01]  Unknown   • Essential hypertension [I10]  Unknown   • Hyperglycemia [R73.9]  Unknown   • Sleep apnea [G47.30]  Unknown   • Hypercholesteremia [E78.00]  Unknown   • Asthma [J45.909]  Unknown      Resolved Hospital Problems   No resolved problems to display.       Assessment and Plan:     Pancreatitis  -Repeat CT of the abdomen pelvis 3/11 showed worsening inflammation associated with his pancreatitis and small mesenteric nodes  -IgG ok. Ca 19-9 mildly elevated. Triglycerides normal. No ductile abnormalities on MRCP  -tolerating full liquids. Advance per GI  - PRN pain meds  - lipase normalized    Fever  -new onset 3/16. WBC and fever trending down today.   - blood cultures pending. BLE doppler and CXR negative   -PRN tylenol  -encourage IS use    - trend WBC and temp closely   Induration/ warmth right arm- Doppler forearm for acute DVT     Anasarca  -ongoing despite being off of IVF and several doses of lasix  -Give additional dose of lasix IV 40mg x 3 doses with K replacement   --Strict IO and daily weights  - wrap legs  -Renal function stable  --BNP normal   -Check echocardiogram     electrolyte abnormalities   -Corrected calcium 9.7  - Na 132. Suspect volume overload. Monitor tmrw with lasix.     Afib  -cardiology following. Remains in NSR, rate controlled on Toprol  -[id number] score is 1. Currently holding on anticoagulation  - ekg with QTc prolongation.     HTN  - BP stable today trending SBP 150s. likely worsened by pain.   -will monitor    RYNE  -home CPAP     VTE prophylaxis: Lovenox 40 mg SC daily  CODE status: full code  Disposition: TBD    I discussed the patients findings and my recommendations with  patient.    Zuri Borrego, APRN  03/17/20

## 2020-03-17 NOTE — PLAN OF CARE
BP better throughout the shift.  Pt asked for Perc and flexeril at 0100.  Monitoring VSS throughout afterwards.  Will cont to monitor.    Problem: Pain, Chronic (Adult)  Goal: Acceptable Pain/Comfort Level and Functional Ability  Outcome: Ongoing (interventions implemented as appropriate)     Problem: Patient Care Overview  Goal: Plan of Care Review  Outcome: Ongoing (interventions implemented as appropriate)  Goal: Individualization and Mutuality  Outcome: Ongoing (interventions implemented as appropriate)  Goal: Discharge Needs Assessment  Outcome: Ongoing (interventions implemented as appropriate)  Goal: Interprofessional Rounds/Family Conf  Outcome: Ongoing (interventions implemented as appropriate)     Problem: Pancreatitis, Acute/Chronic (Adult)  Goal: Signs and Symptoms of Listed Potential Problems Will be Absent, Minimized or Managed (Pancreatitis, Acute/Chronic)  Outcome: Ongoing (interventions implemented as appropriate)     Problem: Arrhythmia/Dysrhythmia (Symptomatic) (Adult)  Goal: Signs and Symptoms of Listed Potential Problems Will be Absent, Minimized or Managed (Arrhythmia/Dysrhythmia)  Outcome: Ongoing (interventions implemented as appropriate)     Problem: Skin Injury Risk (Adult)  Goal: Identify Related Risk Factors and Signs and Symptoms  Outcome: Ongoing (interventions implemented as appropriate)  Goal: Skin Health and Integrity  Outcome: Ongoing (interventions implemented as appropriate)     Problem: Fall Risk (Adult)  Goal: Identify Related Risk Factors and Signs and Symptoms  Outcome: Ongoing (interventions implemented as appropriate)  Goal: Absence of Fall  Outcome: Ongoing (interventions implemented as appropriate)

## 2020-03-17 NOTE — PROGRESS NOTES
Adult Nutrition  Assessment/PES    Patient Name:  Cesar Vann  YOB: 1965  MRN: [id number]  Admit Date:  3/7/2020    Assessment Date:  3/17/2020    Comments:  Nutrition follow up.  Advanced to full liquid diet today from clears.  % x 3 meals of clear liquid diet.  RD to continue to follow.    Reason for Assessment     Row Name 03/17/20 1501          Reason for Assessment    Reason For Assessment  follow-up protocol         Nutrition/Diet History     Row Name 03/17/20 1509          Nutrition/Diet History    Typical Food/Fluid Intake  advanced to full liquid diet this afternoon         Anthropometrics     Row Name 03/17/20 1509 03/17/20 1353       Anthropometrics    Weight  --  125 kg (275 lb 12.8 oz)       Admit Weight    Admit Weight  -- 275# 3/17  --       Body Mass Index (BMI)    BMI Assessment  BMI 35-39.9: obesity grade II  --        Labs/Tests/Procedures/Meds     Row Name 03/17/20 1509          Labs/Procedures/Meds    Lab Results Reviewed  reviewed, pertinent     Lab Results Comments  Gluc, Na, Ca        Diagnostic Tests/Procedures    Diagnostic Test/Procedure Reviewed  reviewed, pertinent        Medications    Pertinent Medications Reviewed  reviewed, pertinent     Pertinent Medications Comments  insulin, miralax, KCl, senokot         Physical Findings     Row Name 03/17/20 1510          Physical Findings    Overall Physical Appearance  obese;on oxygen therapy     Skin  -- B=21, intact         Nutrition Prescription Ordered     Row Name 03/17/20 1510          Nutrition Prescription PO    Current PO Diet  Full Liquid         Evaluation of Received Nutrient/Fluid Intake     Row Name 03/17/20 1510          PO Evaluation    Number of Meals  3     % PO Intake   CLD         Problem/Interventions:    Intervention Goal     Row Name 03/17/20 1512          Intervention Goal    General  Maintain nutrition;Reduce/improve symptoms;Disease management/therapy     PO  Tolerate PO;Advance  diet;PO intake (%)     PO Intake %  75 %     Weight  Appropriate weight loss         Nutrition Intervention     Row Name 03/17/20 1512          Nutrition Intervention    RD/Tech Action  Follow Tx progress;Care plan reviewd         Education/Evaluation     Row Name 03/17/20 1512          Education    Education  Will Instruct as appropriate        Monitor/Evaluation    Monitor  Per protocol;PO intake;Pertinent labs;Weight;Skin status;Symptoms           Electronically signed by:  Adrienne Garg RD  03/17/20 15:13

## 2020-03-17 NOTE — PROGRESS NOTES
"CC: a fib    Interval History: had new fever yesterday. CXR and Lower extremity duplex unremarkable. ECG showed prolong QTc. No nausea. No chest pain. Abdominal pain has resolved.       Vital Signs  Temp:  [99.1 °F (37.3 °C)-101.1 °F (38.4 °C)] 99.1 °F (37.3 °C)  Heart Rate:  [75-85] 75  Resp:  [18] 18  BP: (152-184)/(74-96) 152/74    Intake/Output Summary (Last 24 hours) at 3/17/2020 0745  Last data filed at 3/17/2020 0043  Gross per 24 hour   Intake 720 ml   Output --   Net 720 ml     Flowsheet Rows      First Filed Value   Admission Height  180.3 cm (71\") Documented at 03/07/2020 0210   Admission Weight  122 kg (268 lb 8 oz) Documented at 03/07/2020 0226          PHYSICAL EXAM:  General: No acute distress  Resp:NL Rate, unlabored, clear  CV:NL rate and rhythm, NL PMI, Nl S1 and S2, no Murmur, no gallop, no rub, No JVD. Normal pedal pulses  ABD:Nl sounds, no masses or tenderness, nondistended, no guarding or rebound  Neuro: alert,cooperative and oriented  Extr: No edema or cyanosis, moves all extremities      Results Review:    Results from last 7 days   Lab Units 03/16/20  0420   SODIUM mmol/L 135*   POTASSIUM mmol/L 3.7   CHLORIDE mmol/L 96*   CO2 mmol/L 24.1   BUN mg/dL 10   CREATININE mg/dL 0.75*   GLUCOSE mg/dL 103*   CALCIUM mg/dL 8.4*         Results from last 7 days   Lab Units 03/17/20  0621   WBC 10*3/mm3 11.64*   HEMOGLOBIN g/dL 12.6*   HEMATOCRIT % 36.4*   PLATELETS 10*3/mm3 221             Results from last 7 days   Lab Units 03/12/20  0359   MAGNESIUM mg/dL 2.5         I reviewed the patient's new clinical results.  I personally viewed and interpreted the patient's EKG/Telemetry data        Medication Review:   Meds reviewed         Assessment/Plan    1.  54-year-old gentleman with paroxysmal atrial fibrillation, normal left ventricular systolic function on echo August 2017.  [id number] score is 1.  had prolonged QTC on 200 mg BID. Will decrease that.  From our standpoint would keep him off " anticoagulation as long as he remains in sinus rhythm.  2.  Acute pancreatitis GI following to try clear liquids if he fails will need NG   3.  Hypertension follow his pressure  has been elevated we previously increased his metoprolol. Would reassess outpatient.   4.  Obstructive sleep apnea has home pap here  5.   Normal perfusion stress test August 2017- no angina   6. Fever 3/16/2020 workup per admitting- resolved    -Will decrease amio to 200 once daily. repeat ECG Tomorrow am. Follow his rhythm   JUICE Case  03/17/20  07:45

## 2020-03-17 NOTE — PROGRESS NOTES
[facility]  Inpatient Progress Note    Reason for Follow Up: Acute pancreatitis    Subjective     Interval History:   Tolerating clear liquids, minimal abdominal pain    Current Facility-Administered Medications:   •  acetaminophen (TYLENOL) tablet 650 mg, 650 mg, Oral, Q4H PRN, 650 mg at 03/11/20 1359 **OR** acetaminophen (TYLENOL) 160 MG/5ML solution 650 mg, 650 mg, Oral, Q4H PRN **OR** acetaminophen (TYLENOL) suppository 650 mg, 650 mg, Rectal, Q4H PRN, Dedra Ruff APRN  •  amiodarone (PACERONE) tablet 200 mg, 200 mg, Oral, Q24H, Gabby Teresa APRN, 200 mg at 03/17/20 0835  •  bisacodyl (DULCOLAX) EC tablet 5 mg, 5 mg, Oral, Daily PRN, Dedra Ruff APRN, 5 mg at 03/09/20 2358  •  bisacodyl (DULCOLAX) suppository 10 mg, 10 mg, Rectal, Daily PRN, Emerita Elizbaeth APRN  •  calcium carbonate (TUMS) chewable tablet 500 mg (200 mg elemental), 2 tablet, Oral, BID PRN, Dedra Ruff APRN, 2 tablet at 03/17/20 0048  •  cyclobenzaprine (FLEXERIL) tablet 10 mg, 10 mg, Oral, TID PRN, Kei Arnold MD, 10 mg at 03/17/20 0043  •  dextrose (D50W) 25 g/ 50mL Intravenous Solution 25 g, 25 g, Intravenous, Q15 Min PRN, Kei Arnold MD  •  dextrose (GLUTOSE) oral gel 15 g, 15 g, Oral, Q15 Min PRN, Kei Arnold MD  •  enoxaparin (LOVENOX) syringe 40 mg, 40 mg, Subcutaneous, Q24H, Kei Arnold MD, 40 mg at 03/16/20 1226  •  glucagon (human recombinant) (GLUCAGEN DIAGNOSTIC) injection 1 mg, 1 mg, Subcutaneous, Q15 Min PRN, Kei Arnold MD  •  hydrALAZINE (APRESOLINE) injection 10 mg, 10 mg, Intravenous, Q6H PRN, Stepan Pacheco MD, 10 mg at 03/16/20 1226  •  insulin lispro (humaLOG) injection 0-7 Units, 0-7 Units, Subcutaneous, 4x Daily With Meals & Nightly, Kei Arnold MD, 2 Units at 03/16/20 1226  •  lidocaine (LIDODERM) 5 % 1 patch, 1 patch, Transdermal, Q12H, Norberto Simon MD, 1 patch at 03/16/20 1615  •  metoprolol succinate XL (TOPROL-XL) 24 hr  tablet 100 mg, 100 mg, Oral, BID, Venkatesh Teresayssa, APRN, 100 mg at 03/17/20 0836  •  metoprolol tartrate (LOPRESSOR) injection 5 mg, 5 mg, Intravenous, Q5 Min PRN, Gabby Teresa, APRN  •  nitroglycerin (NITROSTAT) SL tablet 0.4 mg, 0.4 mg, Sublingual, Q5 Min PRN, Dedra Ruff APRN  •  ondansetron (ZOFRAN) tablet 4 mg, 4 mg, Oral, Q6H PRN **OR** ondansetron (ZOFRAN) injection 4 mg, 4 mg, Intravenous, Q6H PRN, Dedra Ruff APRN, 4 mg at 03/11/20 0249  •  oxyCODONE-acetaminophen (PERCOCET) 7.5-325 MG per tablet 1 tablet, 1 tablet, Oral, Q4H PRN, Norberto Simon MD, 1 tablet at 03/17/20 0043  •  polyethylene glycol (MIRALAX) packet 17 g, 17 g, Oral, Daily PRN, Norberto Simon MD, 17 g at 03/10/20 1521  •  polyethylene glycol (MIRALAX) packet 34 g, 34 g, Oral, Daily, Emerita Elizabeth APRN, 34 g at 03/17/20 0835  •  potassium chloride (MICRO-K) CR capsule 40 mEq, 40 mEq, Oral, PRN, 40 mEq at 03/12/20 1332 **OR** potassium chloride (KLOR-CON) packet 40 mEq, 40 mEq, Oral, PRN **OR** potassium chloride 10 mEq in 100 mL IVPB, 10 mEq, Intravenous, Q1H PRN, Norberto Simon MD  •  potassium chloride (MICRO-K) CR capsule 40 mEq, 40 mEq, Oral, Daily, Malick, Gabby, APRN, 40 mEq at 03/17/20 0836  •  promethazine (PHENERGAN) injection 12.5 mg, 12.5 mg, Intravenous, Q6H PRN, Kei Arnold MD, 12.5 mg at 03/08/20 1423  •  senna-docusate sodium (SENOKOT-S) 8.6-50 MG tablet 2 tablet, 2 tablet, Oral, BID, Tate Mckoy MD, 2 tablet at 03/17/20 0835  •  sodium chloride 0.9 % flush 10 mL, 10 mL, Intravenous, PRN, Charmaine Vogel MD  •  sodium chloride 0.9 % flush 10 mL, 10 mL, Intravenous, Q12H, Dedra Ruff APRN, 10 mL at 03/16/20 1932  •  sodium chloride 0.9 % flush 10 mL, 10 mL, Intravenous, PRN, Dedra Ruff APRN, 10 mL at 03/14/20 1254  Review of Systems:    He has weakness fatigue and bloating all other systems reviewed and negative    Objective     Vital  Signs  Temp:  [99.1 °F (37.3 °C)-101.1 °F (38.4 °C)] 99.1 °F (37.3 °C)  Heart Rate:  [75-85] 75  Resp:  [18] 18  BP: (152-184)/(74-96) 152/74  Body mass index is 37.45 kg/m².    Intake/Output Summary (Last 24 hours) at 3/17/2020 1223  Last data filed at 3/17/2020 0900  Gross per 24 hour   Intake 810 ml   Output --   Net 810 ml     I/O this shift:  In: 210 [P.O.:210]  Out: -      Physical Exam:   General: patient awake, alert and cooperative   Eyes: Normal lids and lashes, no scleral icterus   Neck: supple, normal ROM   Skin: warm and dry, not jaundiced   Cardiovascular: regular rhythm and rate, no murmurs auscultated   Pulm: clear to auscultation bilaterally, regular and unlabored   Abdomen: soft, nontender, nondistended; normal bowel sounds   Extremities: no rash or edema   Psychiatric: Normal mood and behavior; memory intact     Results Review:     I reviewed the patient's new clinical results.    Results from last 7 days   Lab Units 03/17/20  0621 03/16/20  0420 03/15/20  0608   WBC 10*3/mm3 11.64* 14.59* 13.77*   HEMOGLOBIN g/dL 12.6* 12.8* 12.6*   HEMATOCRIT % 36.4* 36.7* 36.0*   PLATELETS 10*3/mm3 221 203 214     Results from last 7 days   Lab Units 03/16/20  0420 03/15/20  0608 03/14/20  0526 03/13/20  0435  03/11/20  0816   SODIUM mmol/L 135* 133* 135* 139  --  137   POTASSIUM mmol/L 3.7 3.8 3.6 3.6   < > 3.9   CHLORIDE mmol/L 96* 99 98 102  --  99   CO2 mmol/L 24.1 23.5 25.0 24.9  --  27.6   BUN mg/dL 10 12 17 20  --  19   CREATININE mg/dL 0.75* 0.74* 0.76 0.82  --  0.78   CALCIUM mg/dL 8.4* 8.0* 8.3* 7.7*  --  7.8*   BILIRUBIN mg/dL  --  0.6  --  0.5  --  0.6   ALK PHOS U/L  --  66  --  56  --  60   ALT (SGPT) U/L  --  30  --  28  --  26   AST (SGOT) U/L  --  33  --  26  --  31   GLUCOSE mg/dL 103* 103* 134* 140*  --  143*    < > = values in this interval not displayed.         Lab Results   Lab Value Date/Time    LIPASE 42 03/16/2020 0420    LIPASE 39 03/10/2020 0558    LIPASE 170 (H) 03/09/2020 0427     LIPASE 561 (H) 03/08/2020 0600    LIPASE >3,000 (H) 03/07/2020 0401       Radiology:  XR Chest 1 View   Final Result   No evidence for active disease in the chest.       This report was finalized on 3/16/2020 3:06 PM by Dr. Noman Huang M.D.          CT Abdomen Pelvis With Contrast   Final Result   1. Findings consistent with moderately severe inflammatory change   surrounding the pancreas consistent with pancreatitis. This finding has   progressed since the previous study of 03/07/2020.   2. No pancreatic masses or pseudocysts are seen.   3. Small amount of free fluid in the abdomen and pelvis.   4. Status post cholecystectomy.   5. Small bilateral pleural effusions and bibasilar atelectasis.   6. Several small mesenteric nodes. These could be reassessed on a   follow-up study.               Radiation dose reduction techniques were utilized, including automated   exposure control and exposure modulation based on body size.       This report was finalized on 3/12/2020 8:36 AM by Dr. Kilo Owens M.D.          XR Chest PA & Lateral   Final Result   Small likely atelectasis or scarring.       This report was finalized on 3/9/2020 2:16 PM by Dr. Roger Howe M.D.          MRI abdomen w wo contrast mrcp   Final Result   Markedly limited exam as the patient could not cooperate   with breath-hold procedure. Both pre and postcontrast images were   markedly limited.   MR findings of acute pancreatitis. Status post cholecystectomy. No   evidence of choledocholithiasis. No dilatation of the pancreatic duct.   Additional details as above.       This report was finalized on 3/9/2020 12:47 PM by Dr. Bernadine Atwood M.D.          CT Abdomen Pelvis With Contrast   Final Result       1. Inflammatory stranding is seen surrounding the pancreas, suspicious   for pancreatitis. While there is some fluid identified within the upper   abdomen, no organized peripancreatic collections are seen. The pancreas   enhances  normally.   2. Area of decreased attenuation involving the inferior aspect of the   spleen. Small splenic infarction is not excluded.       Radiation dose reduction techniques were utilized, including automated   exposure control and exposure modulation based on body size.       This report was finalized on 3/7/2020 5:42 AM by Dr. Brianna Soliz M.D.              Assessment/Plan     Patient Active Problem List   Diagnosis   • Acute pancreatitis   • Essential hypertension   • Hyperglycemia   • Sleep apnea   • Hypercholesteremia   • Asthma   • Atrial fibrillation (CMS/HCC)   • Back pain   • Morbid obesity (CMS/HCC)   • Acute constipation   • Fever       Assessment:   Problem List:     Acute pancreatitis    Essential hypertension    Hyperglycemia    Sleep apnea    Hypercholesteremia    Asthma    Atrial fibrillation (CMS/HCC)    Back pain    Morbid obesity (CMS/HCC)    Acute constipation     Patient making some progress now.     Plan:   Full liquid diet    Follow lipase    I discussed the patients findings and my recommendations with patient and nursing staff.    Arnel Still MD

## 2020-03-18 ENCOUNTER — APPOINTMENT (OUTPATIENT)
Dept: CARDIOLOGY | Facility: HOSPITAL | Age: 55
End: 2020-03-18

## 2020-03-18 PROBLEM — I80.9 THROMBOPHLEBITIS: Status: ACTIVE | Noted: 2020-03-18

## 2020-03-18 PROBLEM — E11.9 TYPE 2 DIABETES MELLITUS WITHOUT COMPLICATION, WITHOUT LONG-TERM CURRENT USE OF INSULIN (HCC): Status: ACTIVE | Noted: 2020-03-18

## 2020-03-18 LAB
ALBUMIN SERPL-MCNC: 3.1 G/DL (ref 3.5–5.2)
ALBUMIN/GLOB SERPL: 1 G/DL
ALP SERPL-CCNC: 57 U/L (ref 39–117)
ALT SERPL W P-5'-P-CCNC: 37 U/L (ref 1–41)
ANION GAP SERPL CALCULATED.3IONS-SCNC: 13.8 MMOL/L (ref 5–15)
AORTIC DIMENSIONLESS INDEX: 0.8 (DI)
AST SERPL-CCNC: 41 U/L (ref 1–40)
BH CV ECHO MEAS - ACS: 1.9 CM
BH CV ECHO MEAS - AO ARCH DIAM (PROXIMAL TRANS.): 2.9 CM
BH CV ECHO MEAS - AO MAX PG (FULL): 2.7 MMHG
BH CV ECHO MEAS - AO MAX PG: 10.1 MMHG
BH CV ECHO MEAS - AO MEAN PG (FULL): 2 MMHG
BH CV ECHO MEAS - AO MEAN PG: 5 MMHG
BH CV ECHO MEAS - AO ROOT AREA (BSA CORRECTED): 1.3
BH CV ECHO MEAS - AO ROOT AREA: 7.1 CM^2
BH CV ECHO MEAS - AO ROOT DIAM: 3 CM
BH CV ECHO MEAS - AO V2 MAX: 159 CM/SEC
BH CV ECHO MEAS - AO V2 MEAN: 105 CM/SEC
BH CV ECHO MEAS - AO V2 VTI: 26.7 CM
BH CV ECHO MEAS - ASC AORTA: 3 CM
BH CV ECHO MEAS - AVA(I,A): 2.1 CM^2
BH CV ECHO MEAS - AVA(I,D): 2.1 CM^2
BH CV ECHO MEAS - AVA(V,A): 2.2 CM^2
BH CV ECHO MEAS - AVA(V,D): 2.2 CM^2
BH CV ECHO MEAS - BSA(HAYCOCK): 2.5 M^2
BH CV ECHO MEAS - BSA: 2.4 M^2
BH CV ECHO MEAS - BZI_BMI: 37.7 KILOGRAMS/M^2
BH CV ECHO MEAS - BZI_METRIC_HEIGHT: 180.3 CM
BH CV ECHO MEAS - BZI_METRIC_WEIGHT: 122.5 KG
BH CV ECHO MEAS - DESC AORTA: 2.1 CM
BH CV ECHO MEAS - EDV(CUBED): 117.6 ML
BH CV ECHO MEAS - EDV(MOD-SP2): 83 ML
BH CV ECHO MEAS - EDV(MOD-SP4): 77 ML
BH CV ECHO MEAS - EDV(TEICH): 112.8 ML
BH CV ECHO MEAS - EF(CUBED): 77.1 %
BH CV ECHO MEAS - EF(MOD-BP): 69 %
BH CV ECHO MEAS - EF(MOD-SP2): 66.3 %
BH CV ECHO MEAS - EF(MOD-SP4): 72.7 %
BH CV ECHO MEAS - EF(TEICH): 69 %
BH CV ECHO MEAS - ESV(CUBED): 27 ML
BH CV ECHO MEAS - ESV(MOD-SP2): 28 ML
BH CV ECHO MEAS - ESV(MOD-SP4): 21 ML
BH CV ECHO MEAS - ESV(TEICH): 35 ML
BH CV ECHO MEAS - FS: 38.8 %
BH CV ECHO MEAS - IVS/LVPW: 1
BH CV ECHO MEAS - IVSD: 1.3 CM
BH CV ECHO MEAS - LAT PEAK E' VEL: 10 CM/SEC
BH CV ECHO MEAS - LV DIASTOLIC VOL/BSA (35-75): 32.1 ML/M^2
BH CV ECHO MEAS - LV MASS(C)D: 253.7 GRAMS
BH CV ECHO MEAS - LV MASS(C)DI: 105.9 GRAMS/M^2
BH CV ECHO MEAS - LV MAX PG: 7.4 MMHG
BH CV ECHO MEAS - LV MEAN PG: 3 MMHG
BH CV ECHO MEAS - LV SYSTOLIC VOL/BSA (12-30): 8.8 ML/M^2
BH CV ECHO MEAS - LV V1 MAX: 136 CM/SEC
BH CV ECHO MEAS - LV V1 MEAN: 82.2 CM/SEC
BH CV ECHO MEAS - LV V1 VTI: 22.5 CM
BH CV ECHO MEAS - LVIDD: 4.9 CM
BH CV ECHO MEAS - LVIDS: 3 CM
BH CV ECHO MEAS - LVLD AP2: 7.9 CM
BH CV ECHO MEAS - LVLD AP4: 7.9 CM
BH CV ECHO MEAS - LVLS AP2: 7 CM
BH CV ECHO MEAS - LVLS AP4: 6.5 CM
BH CV ECHO MEAS - LVOT AREA (M): 2.5 CM^2
BH CV ECHO MEAS - LVOT AREA: 2.5 CM^2
BH CV ECHO MEAS - LVOT DIAM: 1.8 CM
BH CV ECHO MEAS - LVPWD: 1.3 CM
BH CV ECHO MEAS - MED PEAK E' VEL: 8 CM/SEC
BH CV ECHO MEAS - MV A DUR: 0.17 SEC
BH CV ECHO MEAS - MV A MAX VEL: 87.3 CM/SEC
BH CV ECHO MEAS - MV DEC SLOPE: 473 CM/SEC^2
BH CV ECHO MEAS - MV DEC TIME: 240 SEC
BH CV ECHO MEAS - MV E MAX VEL: 91.2 CM/SEC
BH CV ECHO MEAS - MV E/A: 1
BH CV ECHO MEAS - MV MAX PG: 3.2 MMHG
BH CV ECHO MEAS - MV MEAN PG: 1 MMHG
BH CV ECHO MEAS - MV P1/2T MAX VEL: 87.4 CM/SEC
BH CV ECHO MEAS - MV P1/2T: 54.1 MSEC
BH CV ECHO MEAS - MV V2 MAX: 90 CM/SEC
BH CV ECHO MEAS - MV V2 MEAN: 49.9 CM/SEC
BH CV ECHO MEAS - MV V2 VTI: 27 CM
BH CV ECHO MEAS - MVA P1/2T LCG: 2.5 CM^2
BH CV ECHO MEAS - MVA(P1/2T): 4.1 CM^2
BH CV ECHO MEAS - MVA(VTI): 2.1 CM^2
BH CV ECHO MEAS - PA ACC TIME: 0.07 SEC
BH CV ECHO MEAS - PA MAX PG (FULL): 5 MMHG
BH CV ECHO MEAS - PA MAX PG: 8.3 MMHG
BH CV ECHO MEAS - PA PR(ACCEL): 47.1 MMHG
BH CV ECHO MEAS - PA V2 MAX: 144 CM/SEC
BH CV ECHO MEAS - PULM A REVS DUR: 0.15 SEC
BH CV ECHO MEAS - PULM A REVS VEL: 44.8 CM/SEC
BH CV ECHO MEAS - PULM DIAS VEL: 56.7 CM/SEC
BH CV ECHO MEAS - PULM S/D: 1.3
BH CV ECHO MEAS - PULM SYS VEL: 71.4 CM/SEC
BH CV ECHO MEAS - PVA(V,A): 2 CM^2
BH CV ECHO MEAS - PVA(V,D): 2 CM^2
BH CV ECHO MEAS - QP/QS: 0.94
BH CV ECHO MEAS - RAP SYSTOLE: 3 MMHG
BH CV ECHO MEAS - RV MAX PG: 3.3 MMHG
BH CV ECHO MEAS - RV MEAN PG: 2 MMHG
BH CV ECHO MEAS - RV V1 MAX: 90.9 CM/SEC
BH CV ECHO MEAS - RV V1 MEAN: 57.6 CM/SEC
BH CV ECHO MEAS - RV V1 VTI: 17.2 CM
BH CV ECHO MEAS - RVOT AREA: 3.1 CM^2
BH CV ECHO MEAS - RVOT DIAM: 2 CM
BH CV ECHO MEAS - SI(AO): 78.8 ML/M^2
BH CV ECHO MEAS - SI(CUBED): 37.8 ML/M^2
BH CV ECHO MEAS - SI(LVOT): 23.9 ML/M^2
BH CV ECHO MEAS - SI(MOD-SP2): 23 ML/M^2
BH CV ECHO MEAS - SI(MOD-SP4): 23.4 ML/M^2
BH CV ECHO MEAS - SI(TEICH): 32.5 ML/M^2
BH CV ECHO MEAS - SV(AO): 188.7 ML
BH CV ECHO MEAS - SV(CUBED): 90.6 ML
BH CV ECHO MEAS - SV(LVOT): 57.3 ML
BH CV ECHO MEAS - SV(MOD-SP2): 55 ML
BH CV ECHO MEAS - SV(MOD-SP4): 56 ML
BH CV ECHO MEAS - SV(RVOT): 54 ML
BH CV ECHO MEAS - SV(TEICH): 77.8 ML
BH CV ECHO MEASUREMENTS AVERAGE E/E' RATIO: 10.13
BH CV UPPER VENOUS LEFT INTERNAL JUGULAR AUGMENT: NORMAL
BH CV UPPER VENOUS LEFT INTERNAL JUGULAR COMPETENT: NORMAL
BH CV UPPER VENOUS LEFT INTERNAL JUGULAR COMPRESS: NORMAL
BH CV UPPER VENOUS LEFT INTERNAL JUGULAR PHASIC: NORMAL
BH CV UPPER VENOUS LEFT INTERNAL JUGULAR SPONT: NORMAL
BH CV UPPER VENOUS LEFT SUBCLAVIAN AUGMENT: NORMAL
BH CV UPPER VENOUS LEFT SUBCLAVIAN COMPETENT: NORMAL
BH CV UPPER VENOUS LEFT SUBCLAVIAN COMPRESS: NORMAL
BH CV UPPER VENOUS LEFT SUBCLAVIAN PHASIC: NORMAL
BH CV UPPER VENOUS LEFT SUBCLAVIAN SPONT: NORMAL
BH CV UPPER VENOUS RIGHT AXILLARY AUGMENT: NORMAL
BH CV UPPER VENOUS RIGHT AXILLARY COMPETENT: NORMAL
BH CV UPPER VENOUS RIGHT AXILLARY COMPRESS: NORMAL
BH CV UPPER VENOUS RIGHT AXILLARY PHASIC: NORMAL
BH CV UPPER VENOUS RIGHT AXILLARY SPONT: NORMAL
BH CV UPPER VENOUS RIGHT BASILIC FOREARM COLOR: 1
BH CV UPPER VENOUS RIGHT BASILIC FOREARM COMPRESS: NORMAL
BH CV UPPER VENOUS RIGHT BASILIC FOREARM THROMBUS: NORMAL
BH CV UPPER VENOUS RIGHT BASILIC UPPER COMPRESS: NORMAL
BH CV UPPER VENOUS RIGHT BRACHIAL COMPRESS: NORMAL
BH CV UPPER VENOUS RIGHT CEPHALIC FOREARM COMPRESS: NORMAL
BH CV UPPER VENOUS RIGHT CEPHALIC UPPER COMPRESS: NORMAL
BH CV UPPER VENOUS RIGHT INTERNAL JUGULAR AUGMENT: NORMAL
BH CV UPPER VENOUS RIGHT INTERNAL JUGULAR COMPETENT: NORMAL
BH CV UPPER VENOUS RIGHT INTERNAL JUGULAR COMPRESS: NORMAL
BH CV UPPER VENOUS RIGHT INTERNAL JUGULAR PHASIC: NORMAL
BH CV UPPER VENOUS RIGHT INTERNAL JUGULAR SPONT: NORMAL
BH CV UPPER VENOUS RIGHT RADIAL COMPRESS: NORMAL
BH CV UPPER VENOUS RIGHT SUBCLAVIAN AUGMENT: NORMAL
BH CV UPPER VENOUS RIGHT SUBCLAVIAN COMPETENT: NORMAL
BH CV UPPER VENOUS RIGHT SUBCLAVIAN COMPRESS: NORMAL
BH CV UPPER VENOUS RIGHT SUBCLAVIAN PHASIC: NORMAL
BH CV UPPER VENOUS RIGHT SUBCLAVIAN SPONT: NORMAL
BH CV UPPER VENOUS RIGHT ULNAR COMPRESS: NORMAL
BH CV VAS BP RIGHT ARM: NORMAL MMHG
BH CV XLRA - RV BASE: 4 CM
BH CV XLRA - RV LENGTH: 8.2 CM
BH CV XLRA - RV MID: 3.5 CM
BH CV XLRA - TDI S': 18 CM/SEC
BILIRUB SERPL-MCNC: 0.4 MG/DL (ref 0.2–1.2)
BUN BLD-MCNC: 9 MG/DL (ref 6–20)
BUN/CREAT SERPL: 11.3 (ref 7–25)
CALCIUM SPEC-SCNC: 8.3 MG/DL (ref 8.6–10.5)
CHLORIDE SERPL-SCNC: 98 MMOL/L (ref 98–107)
CO2 SERPL-SCNC: 25.2 MMOL/L (ref 22–29)
CREAT BLD-MCNC: 0.8 MG/DL (ref 0.76–1.27)
DEPRECATED RDW RBC AUTO: 40.2 FL (ref 37–54)
ERYTHROCYTE [DISTWIDTH] IN BLOOD BY AUTOMATED COUNT: 12.3 % (ref 12.3–15.4)
GFR SERPL CREATININE-BSD FRML MDRD: 101 ML/MIN/1.73
GLOBULIN UR ELPH-MCNC: 3.2 GM/DL
GLUCOSE BLD-MCNC: 132 MG/DL (ref 65–99)
GLUCOSE BLDC GLUCOMTR-MCNC: 137 MG/DL (ref 70–130)
GLUCOSE BLDC GLUCOMTR-MCNC: 143 MG/DL (ref 70–130)
GLUCOSE BLDC GLUCOMTR-MCNC: 162 MG/DL (ref 70–130)
GLUCOSE BLDC GLUCOMTR-MCNC: 186 MG/DL (ref 70–130)
HCT VFR BLD AUTO: 36.3 % (ref 37.5–51)
HGB BLD-MCNC: 12.5 G/DL (ref 13–17.7)
LEFT ATRIUM VOLUME INDEX: 16 ML/M2
LEFT ATRIUM VOLUME: 35 CM3
MAGNESIUM SERPL-MCNC: 2 MG/DL (ref 1.6–2.6)
MAXIMAL PREDICTED HEART RATE: 166 BPM
MCH RBC QN AUTO: 30.9 PG (ref 26.6–33)
MCHC RBC AUTO-ENTMCNC: 34.4 G/DL (ref 31.5–35.7)
MCV RBC AUTO: 89.6 FL (ref 79–97)
PLATELET # BLD AUTO: 233 10*3/MM3 (ref 140–450)
PMV BLD AUTO: 8.6 FL (ref 6–12)
POTASSIUM BLD-SCNC: 3.6 MMOL/L (ref 3.5–5.2)
PROT SERPL-MCNC: 6.3 G/DL (ref 6–8.5)
RBC # BLD AUTO: 4.05 10*6/MM3 (ref 4.14–5.8)
SODIUM BLD-SCNC: 137 MMOL/L (ref 136–145)
STRESS TARGET HR: 141 BPM
WBC NRBC COR # BLD: 9.27 10*3/MM3 (ref 3.4–10.8)

## 2020-03-18 PROCEDURE — 99232 SBSQ HOSP IP/OBS MODERATE 35: CPT | Performed by: INTERNAL MEDICINE

## 2020-03-18 PROCEDURE — 80053 COMPREHEN METABOLIC PANEL: CPT | Performed by: NURSE PRACTITIONER

## 2020-03-18 PROCEDURE — 93010 ELECTROCARDIOGRAM REPORT: CPT | Performed by: INTERNAL MEDICINE

## 2020-03-18 PROCEDURE — 82962 GLUCOSE BLOOD TEST: CPT

## 2020-03-18 PROCEDURE — 93005 ELECTROCARDIOGRAM TRACING: CPT | Performed by: NURSE PRACTITIONER

## 2020-03-18 PROCEDURE — 93306 TTE W/DOPPLER COMPLETE: CPT

## 2020-03-18 PROCEDURE — 83735 ASSAY OF MAGNESIUM: CPT | Performed by: NURSE PRACTITIONER

## 2020-03-18 PROCEDURE — 93306 TTE W/DOPPLER COMPLETE: CPT | Performed by: INTERNAL MEDICINE

## 2020-03-18 PROCEDURE — 25010000002 ENOXAPARIN PER 10 MG: Performed by: HOSPITALIST

## 2020-03-18 PROCEDURE — 25010000002 FUROSEMIDE PER 20 MG: Performed by: NURSE PRACTITIONER

## 2020-03-18 PROCEDURE — 85027 COMPLETE CBC AUTOMATED: CPT | Performed by: NURSE PRACTITIONER

## 2020-03-18 PROCEDURE — 63710000001 INSULIN LISPRO (HUMAN) PER 5 UNITS: Performed by: HOSPITALIST

## 2020-03-18 RX ORDER — BLOOD-GLUCOSE METER
1 KIT MISCELLANEOUS AS NEEDED
Qty: 1 EACH | Refills: 0 | Status: SHIPPED | OUTPATIENT
Start: 2020-03-18 | End: 2020-03-19 | Stop reason: SDUPTHER

## 2020-03-18 RX ORDER — LANCETS 28 GAUGE
EACH MISCELLANEOUS
Qty: 100 EACH | Refills: 1 | Status: SHIPPED | OUTPATIENT
Start: 2020-03-18 | End: 2020-03-19 | Stop reason: SDUPTHER

## 2020-03-18 RX ADMIN — ANTACID TABLETS 2 TABLET: 500 TABLET, CHEWABLE ORAL at 23:25

## 2020-03-18 RX ADMIN — POTASSIUM CHLORIDE 40 MEQ: 10 CAPSULE, COATED, EXTENDED RELEASE ORAL at 06:57

## 2020-03-18 RX ADMIN — INSULIN LISPRO 2 UNITS: 100 INJECTION, SOLUTION INTRAVENOUS; SUBCUTANEOUS at 17:50

## 2020-03-18 RX ADMIN — CYCLOBENZAPRINE 10 MG: 10 TABLET, FILM COATED ORAL at 00:21

## 2020-03-18 RX ADMIN — SENNOSIDES AND DOCUSATE SODIUM 2 TABLET: 8.6; 5 TABLET ORAL at 07:29

## 2020-03-18 RX ADMIN — ACETAMINOPHEN 650 MG: 325 TABLET, FILM COATED ORAL at 23:25

## 2020-03-18 RX ADMIN — FUROSEMIDE 40 MG: 10 INJECTION, SOLUTION INTRAMUSCULAR; INTRAVENOUS at 17:50

## 2020-03-18 RX ADMIN — LIDOCAINE 1 PATCH: 50 PATCH TOPICAL at 17:50

## 2020-03-18 RX ADMIN — METOPROLOL SUCCINATE 100 MG: 100 TABLET, FILM COATED, EXTENDED RELEASE ORAL at 07:29

## 2020-03-18 RX ADMIN — SODIUM CHLORIDE, PRESERVATIVE FREE 10 ML: 5 INJECTION INTRAVENOUS at 20:03

## 2020-03-18 RX ADMIN — POTASSIUM CHLORIDE 40 MEQ: 10 CAPSULE, COATED, EXTENDED RELEASE ORAL at 07:29

## 2020-03-18 RX ADMIN — INSULIN LISPRO 2 UNITS: 100 INJECTION, SOLUTION INTRAVENOUS; SUBCUTANEOUS at 22:01

## 2020-03-18 RX ADMIN — METOPROLOL SUCCINATE 100 MG: 100 TABLET, FILM COATED, EXTENDED RELEASE ORAL at 20:03

## 2020-03-18 RX ADMIN — OXYCODONE HYDROCHLORIDE AND ACETAMINOPHEN 1 TABLET: 7.5; 325 TABLET ORAL at 00:21

## 2020-03-18 RX ADMIN — ANTACID TABLETS 1 TABLET: 500 TABLET, CHEWABLE ORAL at 17:54

## 2020-03-18 RX ADMIN — AMIODARONE HYDROCHLORIDE 200 MG: 200 TABLET ORAL at 07:28

## 2020-03-18 RX ADMIN — CYCLOBENZAPRINE 10 MG: 10 TABLET, FILM COATED ORAL at 23:25

## 2020-03-18 RX ADMIN — ACETAMINOPHEN 650 MG: 325 TABLET, FILM COATED ORAL at 11:51

## 2020-03-18 RX ADMIN — SODIUM CHLORIDE, PRESERVATIVE FREE 10 ML: 5 INJECTION INTRAVENOUS at 07:35

## 2020-03-18 RX ADMIN — ENOXAPARIN SODIUM 40 MG: 40 INJECTION SUBCUTANEOUS at 12:43

## 2020-03-18 RX ADMIN — FUROSEMIDE 40 MG: 10 INJECTION, SOLUTION INTRAMUSCULAR; INTRAVENOUS at 07:28

## 2020-03-18 NOTE — PLAN OF CARE
Cont IV lasix once more time and re-evaluate edema tomorrow.  If all looks good, possible DC tomorrow.  BP Better.  Will cont to monitor.    Problem: Pain, Chronic (Adult)  Goal: Acceptable Pain/Comfort Level and Functional Ability  Outcome: Ongoing (interventions implemented as appropriate)     Problem: Patient Care Overview  Goal: Plan of Care Review  Outcome: Ongoing (interventions implemented as appropriate)  Goal: Individualization and Mutuality  Outcome: Ongoing (interventions implemented as appropriate)  Goal: Discharge Needs Assessment  Outcome: Ongoing (interventions implemented as appropriate)  Goal: Interprofessional Rounds/Family Conf  Outcome: Ongoing (interventions implemented as appropriate)     Problem: Pancreatitis, Acute/Chronic (Adult)  Goal: Signs and Symptoms of Listed Potential Problems Will be Absent, Minimized or Managed (Pancreatitis, Acute/Chronic)  Outcome: Ongoing (interventions implemented as appropriate)     Problem: Arrhythmia/Dysrhythmia (Symptomatic) (Adult)  Goal: Signs and Symptoms of Listed Potential Problems Will be Absent, Minimized or Managed (Arrhythmia/Dysrhythmia)  Outcome: Ongoing (interventions implemented as appropriate)     Problem: Skin Injury Risk (Adult)  Goal: Identify Related Risk Factors and Signs and Symptoms  Outcome: Ongoing (interventions implemented as appropriate)  Goal: Skin Health and Integrity  Outcome: Ongoing (interventions implemented as appropriate)     Problem: Fall Risk (Adult)  Goal: Identify Related Risk Factors and Signs and Symptoms  Outcome: Ongoing (interventions implemented as appropriate)  Goal: Absence of Fall  Outcome: Ongoing (interventions implemented as appropriate)

## 2020-03-18 NOTE — PROGRESS NOTES
[facility]  Inpatient Progress Note    Reason for Follow Up: Acute pancreatitis    Subjective     Interval History:   Tolerating full liquids    Current Facility-Administered Medications:   •  acetaminophen (TYLENOL) tablet 650 mg, 650 mg, Oral, Q4H PRN, 650 mg at 03/18/20 1151 **OR** acetaminophen (TYLENOL) 160 MG/5ML solution 650 mg, 650 mg, Oral, Q4H PRN **OR** acetaminophen (TYLENOL) suppository 650 mg, 650 mg, Rectal, Q4H PRN, Dedra Ruff APRN  •  amiodarone (PACERONE) tablet 200 mg, 200 mg, Oral, Q24H, Gabby Teresa APRN, 200 mg at 03/18/20 0728  •  bisacodyl (DULCOLAX) EC tablet 5 mg, 5 mg, Oral, Daily PRN, Dedra Ruff APRN, 5 mg at 03/09/20 2358  •  bisacodyl (DULCOLAX) suppository 10 mg, 10 mg, Rectal, Daily PRN, Emerita Elizabeth APRN  •  calcium carbonate (TUMS) chewable tablet 500 mg (200 mg elemental), 2 tablet, Oral, BID PRN, Dedra Ruff APRN, 2 tablet at 03/17/20 0048  •  cyclobenzaprine (FLEXERIL) tablet 10 mg, 10 mg, Oral, TID PRN, Kei Arnold MD, 10 mg at 03/18/20 0021  •  dextrose (D50W) 25 g/ 50mL Intravenous Solution 25 g, 25 g, Intravenous, Q15 Min PRN, Kei Arnold MD  •  dextrose (GLUTOSE) oral gel 15 g, 15 g, Oral, Q15 Min PRN, Kei Arnold MD  •  enoxaparin (LOVENOX) syringe 40 mg, 40 mg, Subcutaneous, Q24H, Kei Arnold MD, 40 mg at 03/17/20 1553  •  furosemide (LASIX) injection 40 mg, 40 mg, Intravenous, Q12H, Zuri Borrego APRN, 40 mg at 03/18/20 0728  •  glucagon (human recombinant) (GLUCAGEN DIAGNOSTIC) injection 1 mg, 1 mg, Subcutaneous, Q15 Min PRN, Kei Arnold MD  •  hydrALAZINE (APRESOLINE) injection 10 mg, 10 mg, Intravenous, Q6H PRN, Stepan Pacheco MD, 10 mg at 03/16/20 1226  •  insulin lispro (humaLOG) injection 0-7 Units, 0-7 Units, Subcutaneous, 4x Daily With Meals & Nightly, Kei Arnold MD, 2 Units at 03/17/20 2144  •  lidocaine (LIDODERM) 5 % 1 patch, 1 patch, Transdermal, Q12H, [place],  Norberto Glynn MD, 1 patch at 03/17/20 1553  •  metoprolol succinate XL (TOPROL-XL) 24 hr tablet 100 mg, 100 mg, Oral, BID, Malick, Gabby, APRN, 100 mg at 03/18/20 0729  •  metoprolol tartrate (LOPRESSOR) injection 5 mg, 5 mg, Intravenous, Q5 Min PRN, Malick, Gabby, APRN  •  nitroglycerin (NITROSTAT) SL tablet 0.4 mg, 0.4 mg, Sublingual, Q5 Min PRN, Dedra Ruff APRN  •  ondansetron (ZOFRAN) tablet 4 mg, 4 mg, Oral, Q6H PRN **OR** ondansetron (ZOFRAN) injection 4 mg, 4 mg, Intravenous, Q6H PRN, Dedra Ruff APRN, 4 mg at 03/11/20 0249  •  oxyCODONE-acetaminophen (PERCOCET) 7.5-325 MG per tablet 1 tablet, 1 tablet, Oral, Q4H PRN, Norberto Simon MD, 1 tablet at 03/18/20 0021  •  polyethylene glycol (MIRALAX) packet 17 g, 17 g, Oral, Daily PRN, Norberto Simon MD, 17 g at 03/10/20 1521  •  polyethylene glycol (MIRALAX) packet 34 g, 34 g, Oral, Daily, [place], Emerita, APRN, 34 g at 03/17/20 0835  •  potassium chloride (MICRO-K) CR capsule 40 mEq, 40 mEq, Oral, PRN, 40 mEq at 03/18/20 0657 **OR** potassium chloride (KLOR-CON) packet 40 mEq, 40 mEq, Oral, PRN **OR** potassium chloride 10 mEq in 100 mL IVPB, 10 mEq, Intravenous, Q1H PRN, Norberto Simon MD  •  potassium chloride (MICRO-K) CR capsule 40 mEq, 40 mEq, Oral, Daily, Malick, Gabby, APRN, 40 mEq at 03/18/20 0729  •  promethazine (PHENERGAN) injection 12.5 mg, 12.5 mg, Intravenous, Q6H PRN, Kei Arnold MD, 12.5 mg at 03/08/20 1423  •  senna-docusate sodium (SENOKOT-S) 8.6-50 MG tablet 2 tablet, 2 tablet, Oral, BID, Tate Mckoy MD, 2 tablet at 03/18/20 0729  •  sodium chloride 0.9 % flush 10 mL, 10 mL, Intravenous, PRN, Charmaine Vogel MD  •  sodium chloride 0.9 % flush 10 mL, 10 mL, Intravenous, Q12H, Dedra Ruff APRN, 10 mL at 03/18/20 0735  •  sodium chloride 0.9 % flush 10 mL, 10 mL, Intravenous, PRN, Dedra Ruff APRN, 10 mL at 03/14/20 1254  Review of Systems:   GI:  negative    Objective     Vital Signs  Temp:  [98.3 °F (36.8 °C)-99.9 °F (37.7 °C)] 98.3 °F (36.8 °C)  Heart Rate:  [71-90] 74  Resp:  [16-18] 18  BP: (142-187)/(78-95) 158/90  Body mass index is 37.66 kg/m².    Intake/Output Summary (Last 24 hours) at 3/18/2020 1151  Last data filed at 3/18/2020 1009  Gross per 24 hour   Intake 690 ml   Output 250 ml   Net 440 ml     I/O this shift:  In: 240 [P.O.:240]  Out: 250 [Urine:250]     Physical Exam:   General: patient awake, alert and cooperative   Abdomen: soft, nontender, nondistended; normal bowel sounds   Extremities: 2+ LE edema   Psychiatric: Normal mood and behavior; memory intact     Results Review:     I reviewed the patient's new clinical results.    Results from last 7 days   Lab Units 03/18/20  0429 03/17/20  0621 03/16/20  0420   WBC 10*3/mm3 9.27 11.64* 14.59*   HEMOGLOBIN g/dL 12.5* 12.6* 12.8*   HEMATOCRIT % 36.3* 36.4* 36.7*   PLATELETS 10*3/mm3 233 221 203     Results from last 7 days   Lab Units 03/18/20  0429 03/17/20  1415 03/16/20  0420 03/15/20  0608  03/13/20  0435   SODIUM mmol/L 137 132* 135* 133*   < > 139   POTASSIUM mmol/L 3.6 4.0 3.7 3.8   < > 3.6   CHLORIDE mmol/L 98 95* 96* 99   < > 102   CO2 mmol/L 25.2 26.1 24.1 23.5   < > 24.9   BUN mg/dL 9 10 10 12   < > 20   CREATININE mg/dL 0.80 0.90 0.75* 0.74*   < > 0.82   CALCIUM mg/dL 8.3* 8.2* 8.4* 8.0*   < > 7.7*   BILIRUBIN mg/dL 0.4  --   --  0.6  --  0.5   ALK PHOS U/L 57  --   --  66  --  56   ALT (SGPT) U/L 37  --   --  30  --  28   AST (SGOT) U/L 41*  --   --  33  --  26   GLUCOSE mg/dL 132* 129* 103* 103*   < > 140*    < > = values in this interval not displayed.         Lab Results   Lab Value Date/Time    LIPASE 42 03/16/2020 0420    LIPASE 39 03/10/2020 0558    LIPASE 170 (H) 03/09/2020 0427    LIPASE 561 (H) 03/08/2020 0600    LIPASE >3,000 (H) 03/07/2020 0401       Radiology:  XR Chest 1 View   Final Result   No evidence for active disease in the chest.       This report was  finalized on 3/16/2020 3:06 PM by Dr. Noman Huang M.D.          CT Abdomen Pelvis With Contrast   Final Result   1. Findings consistent with moderately severe inflammatory change   surrounding the pancreas consistent with pancreatitis. This finding has   progressed since the previous study of 03/07/2020.   2. No pancreatic masses or pseudocysts are seen.   3. Small amount of free fluid in the abdomen and pelvis.   4. Status post cholecystectomy.   5. Small bilateral pleural effusions and bibasilar atelectasis.   6. Several small mesenteric nodes. These could be reassessed on a   follow-up study.               Radiation dose reduction techniques were utilized, including automated   exposure control and exposure modulation based on body size.       This report was finalized on 3/12/2020 8:36 AM by Dr. Kilo Owens M.D.          XR Chest PA & Lateral   Final Result   Small likely atelectasis or scarring.       This report was finalized on 3/9/2020 2:16 PM by Dr. Roger Howe M.D.          MRI abdomen w wo contrast mrcp   Final Result   Markedly limited exam as the patient could not cooperate   with breath-hold procedure. Both pre and postcontrast images were   markedly limited.   MR findings of acute pancreatitis. Status post cholecystectomy. No   evidence of choledocholithiasis. No dilatation of the pancreatic duct.   Additional details as above.       This report was finalized on 3/9/2020 12:47 PM by Dr. Bernadine Atwood M.D.          CT Abdomen Pelvis With Contrast   Final Result       1. Inflammatory stranding is seen surrounding the pancreas, suspicious   for pancreatitis. While there is some fluid identified within the upper   abdomen, no organized peripancreatic collections are seen. The pancreas   enhances normally.   2. Area of decreased attenuation involving the inferior aspect of the   spleen. Small splenic infarction is not excluded.       Radiation dose reduction techniques were  utilized, including automated   exposure control and exposure modulation based on body size.       This report was finalized on 3/7/2020 5:42 AM by Dr. Brianna Soliz M.D.              Assessment/Plan     Patient Active Problem List   Diagnosis   • Acute pancreatitis   • Essential hypertension   • Hyperglycemia   • Sleep apnea   • Hypercholesteremia   • Asthma   • Atrial fibrillation (CMS/HCC)   • Back pain   • Morbid obesity (CMS/HCC)   • Acute constipation   • Fever       Assessment:     Acute pancreatitis    Morbid obesity (CMS/HCC)    Acute constipation     Plan:   Trial of low fat diet for lunch  Follow lipase  Continue bowel regimen  Ambulation as tolerated    I discussed the patients findings and my recommendations with patient and nursing staff.          Arnel Eid M.D.  [facility]  [address] [place] [address]  Office: [phone]

## 2020-03-18 NOTE — PROGRESS NOTES
Progress Note    Name: Cesar Vann ADMIT: 3/7/2020   : 1965  PCP: Bing Eduardo APRN    MRN: [id number] LOS: 11 days   AGE/SEX: 54 y.o. male  ROOM: [facility]   Date of Encounter Visit: 20    Subjective:     Interval History: diet to be advanced and has been tolerating full liquids.     REVIEW OF SYSTEMS:   No chills or fever. Low grade temp last night.    No chest pain, palpitations. Edema improving.   No SOA. No cough.   No nausea, vomiting. Abdominal distention improving. Loose stools last night and this am and held miralax. Tolerating diet with no abdominal pain.     Objective:   Temp:  [98.3 °F (36.8 °C)-99.9 °F (37.7 °C)] 98.3 °F (36.8 °C)  Heart Rate:  [71-90] 74  Resp:  [16-18] 18  BP: (142-187)/(78-95) 158/90   SpO2:  [96 %-99 %] 99 %  on    Device (Oxygen Therapy): room air    Intake/Output Summary (Last 24 hours) at 3/18/2020 1226  Last data filed at 3/18/2020 1009  Gross per 24 hour   Intake 690 ml   Output 250 ml   Net 440 ml     Body mass index is 37.66 kg/m².      20  1353 20  0458 20  0959   Weight: 125 kg (275 lb 12.8 oz) 122 kg (270 lb) 122 kg (270 lb)     Weight change:     Physical Exam   Constitutional: He is oriented to person, place, and time. He appears well-developed and well-nourished. No distress.   Morbidly obese   HENT:   Head: Normocephalic and atraumatic.   Mouth/Throat: No oropharyngeal exudate.   Eyes: Conjunctivae are normal. No scleral icterus.   Neck: Neck supple.   Cardiovascular: Normal rate, regular rhythm and intact distal pulses.  Occasional extrasystoles are present.   No murmur heard.  Pulmonary/Chest: Effort normal and breath sounds normal. No respiratory distress. He has no wheezes.   Abdominal: Bowel sounds are normal. He exhibits distension. There is tenderness.   Musculoskeletal: He exhibits edema (1+ BLE and dependent abdominal edema).   Neurological: He is alert and oriented to person, place, and time.   Skin: Skin is warm  and dry. No rash noted.   Right forearm erythema and induration   Psychiatric: He has a normal mood and affect.   Vitals reviewed.      Results Review:      Results from last 7 days   Lab Units 03/18/20  0429 03/17/20  1415 03/16/20  0420 03/15/20  0608 03/14/20  0526 03/13/20  0435 03/12/20  0359   SODIUM mmol/L 137 132* 135* 133* 135* 139  --    POTASSIUM mmol/L 3.6 4.0 3.7 3.8 3.6 3.6 3.1*   CHLORIDE mmol/L 98 95* 96* 99 98 102  --    CO2 mmol/L 25.2 26.1 24.1 23.5 25.0 24.9  --    BUN mg/dL 9 10 10 12 17 20  --    CREATININE mg/dL 0.80 0.90 0.75* 0.74* 0.76 0.82  --    GLUCOSE mg/dL 132* 129* 103* 103* 134* 140*  --    CALCIUM mg/dL 8.3* 8.2* 8.4* 8.0* 8.3* 7.7*  --    AST (SGOT) U/L 41*  --   --  33  --  26  --    ALT (SGPT) U/L 37  --   --  30  --  28  --      Estimated Creatinine Clearance: 140.3 mL/min (by C-G formula based on SCr of 0.8 mg/dL).      Results from last 7 days   Lab Units 03/18/20  1056 03/18/20  0624 03/17/20  2102 03/17/20  1809 03/17/20  1116 03/17/20  0621 03/16/20  2036 03/16/20  1613   GLUCOSE mg/dL 143* 137* 157* 140* 128 118 137* 129         Results from last 7 days   Lab Units 03/17/20  1415   PROBNP pg/mL 144.2         Results from last 7 days   Lab Units 03/18/20  0429 03/12/20  0359   MAGNESIUM mg/dL 2.0 2.5           Invalid input(s): LDLCALC  Results from last 7 days   Lab Units 03/18/20  0429 03/17/20  0621 03/16/20  0420 03/15/20  0608 03/14/20  0526 03/13/20  0435   WBC 10*3/mm3 9.27 11.64* 14.59* 13.77* 13.45* 10.21   HEMOGLOBIN g/dL 12.5* 12.6* 12.8* 12.6* 12.7* 12.2*   HEMATOCRIT % 36.3* 36.4* 36.7* 36.0* 38.1 36.3*   PLATELETS 10*3/mm3 233 221 203 214 224 183   MCV fL 89.6 90.3 90.8 90.7 91.8 89.9   MCH pg 30.9 31.3 31.7 31.7 30.6 30.2   MCHC g/dL 34.4 34.6 34.9 35.0 33.3 33.6   RDW % 12.3 12.3 12.6 12.6 12.9 12.7   RDW-SD fl 40.2 40.3 41.7 42.1 42.8 42.1   MPV fL 8.6 8.7 8.9 8.6 8.8 9.0   NEUTROPHIL % %  --  76.9*  --   --   --  74.0   LYMPHOCYTE % %  --  11.2*  --   --    --  9.7*   MONOCYTES % %  --  8.3  --   --   --  11.5   EOSINOPHIL % %  --  1.3  --   --   --  2.2   BASOPHIL % %  --  0.3  --   --   --  0.5   IMM GRAN % %  --  2.0*  --   --   --   --    NEUTROS ABS 10*3/mm3  --  8.95*  --   --   --  7.57*   LYMPHS ABS 10*3/mm3  --  1.30  --   --   --  0.99   MONOS ABS 10*3/mm3  --  0.97*  --   --   --  1.17*   EOS ABS 10*3/mm3  --  0.15  --   --   --  0.22   BASOS ABS 10*3/mm3  --  0.04  --   --   --  0.05   IMMATURE GRANS (ABS) 10*3/mm3  --  0.23*  --   --   --   --    NRBC /100 WBC  --  0.0  --   --   --   --                      Results from last 7 days   Lab Units 03/16/20  0420   LIPASE U/L 42                       Imaging:  Imaging Results (Last 24 Hours)     ** No results found for the last 24 hours. **          I reviewed the patient's new clinical results and medications.         Medication Review:   Scheduled Meds:    amiodarone 200 mg Oral Q24H   enoxaparin 40 mg Subcutaneous Q24H   furosemide 40 mg Intravenous Q12H   insulin lispro 0-7 Units Subcutaneous 4x Daily With Meals & Nightly   lidocaine 1 patch Transdermal Q12H   metoprolol succinate  mg Oral BID   polyethylene glycol 34 g Oral Daily   potassium chloride 40 mEq Oral Daily   senna-docusate sodium 2 tablet Oral BID   sodium chloride 10 mL Intravenous Q12H     Continuous Infusions:   PRN Meds:.•  acetaminophen **OR** acetaminophen **OR** acetaminophen  •  bisacodyl  •  bisacodyl  •  calcium carbonate  •  cyclobenzaprine  •  dextrose  •  dextrose  •  glucagon (human recombinant)  •  hydrALAZINE  •  metoprolol tartrate  •  nitroglycerin  •  ondansetron **OR** ondansetron  •  oxyCODONE-acetaminophen  •  polyethylene glycol  •  potassium chloride **OR** potassium chloride **OR** potassium chloride  •  promethazine  •  sodium chloride  •  sodium chloride    Problem List:     Active Hospital Problems    Diagnosis  POA   • **Acute pancreatitis [K85.90]  Yes   • Fever [R50.9]  Unknown   • Acute constipation  [K59.00]  Unknown   • Atrial fibrillation (CMS/HCC) [I48.91]  Unknown   • Back pain [M54.9]  Unknown   • Morbid obesity (CMS/HCC) [E66.01]  Unknown   • Essential hypertension [I10]  Unknown   • Hyperglycemia [R73.9]  Unknown   • Sleep apnea [G47.30]  Unknown   • Hypercholesteremia [E78.00]  Unknown   • Asthma [J45.909]  Unknown      Resolved Hospital Problems   No resolved problems to display.       Assessment and Plan:     Pancreatitis  -Repeat CT of the abdomen pelvis 3/11 showed worsening inflammation associated with his pancreatitis and small mesenteric nodes  -IgG ok. Ca 19-9 mildly elevated. Triglycerides normal. No ductile abnormalities on MRCP  -advance diet to low fat  - PRN pain meds    Constipation  -adjust bowel regimen given diarrhea.     Afib  -cardiology following. Back in NSR, rate controlled on increased Toprol and amiodarone. QTc improved.   -[id number] score is 1. Currently holding on anticoagulation    HTN  - BP labile and likely worsened by pain. Stable today.   - continue IV lasix for 1 more dose. Consider adding HCTZ at discharge.   -echo pending    RYNE  -home CPAP    Fever/ thrombophlebitis  - fever and leukocytosis resolved. Blood cultures NGTD.   -BLE dopplers negative.   -SVT RUE and may have been cause of fever. encouraged warm compresses.     Diabetes mellitus type 2  -A1c 8.8. Newly diagnosed  -DM educator and nutrition education  -discussed treatment options with patient and will try metformin.   -will need to monitor glucose at home and supplies were ordered.     VTE prophylaxis: Lovenox 40 mg SC daily  CODE status: full code  Disposition: TBD- likely home tomorrow. CCP to help set up new PCP    I discussed the patients findings and my recommendations with patient and nursing staff.    Emerita Elizabeth, APRN  03/18/20

## 2020-03-18 NOTE — PROGRESS NOTES
Continued Stay Note  [facility]     Patient Name: Cesar Vann  MRN: [id number]  Today's Date: 3/18/2020    Admit Date: 3/7/2020    Discharge Plan     Row Name 03/18/20 1358       Plan    Plan Comments  Patient needing assistance to find PCP.  Met with patient and he would like a [facility] physician.  Called the patient appotiment liason at [phone] and was able to get an appointment o n 3/25 at 0730 with Dr. Roger Man .   Patient agrees to this appointment and gave him address and contact info for Dr. Man.          Discharge Codes    No documentation.             Noa Mclaughlin RN

## 2020-03-18 NOTE — PROGRESS NOTES
"CC: AFib    Interval History:   Doing well no recurrent A. fib.    Vital Signs  Temp:  [98.3 °F (36.8 °C)-99.9 °F (37.7 °C)] 98.3 °F (36.8 °C)  Heart Rate:  [71-90] 74  Resp:  [16-18] 18  BP: (142-187)/(78-95) 158/90    Intake/Output Summary (Last 24 hours) at 3/18/2020 0735  Last data filed at 3/17/2020 1959  Gross per 24 hour   Intake 660 ml   Output --   Net 660 ml     Flowsheet Rows      First Filed Value   Admission Height  180.3 cm (71\") Documented at 03/07/2020 0210   Admission Weight  122 kg (268 lb 8 oz) Documented at 03/07/2020 0226          PHYSICAL EXAM:  General: No acute distress  Resp:NL Rate, unlabored, clear  CV:NL rate and rhythm, NL PMI, Nl S1 and S2, no Murmur, no gallop, no rub, No JVD. Normal pedal pulses  ABD:Nl sounds, no masses or tenderness, nondistended, no guarding or rebound  Neuro: alert,cooperative and oriented  Extr: No edema or cyanosis, moves all extremities      Results Review:    Results from last 7 days   Lab Units 03/18/20  0429   SODIUM mmol/L 137   POTASSIUM mmol/L 3.6   CHLORIDE mmol/L 98   CO2 mmol/L 25.2   BUN mg/dL 9   CREATININE mg/dL 0.80   GLUCOSE mg/dL 132*   CALCIUM mg/dL 8.3*         Results from last 7 days   Lab Units 03/18/20  0429   WBC 10*3/mm3 9.27   HEMOGLOBIN g/dL 12.5*   HEMATOCRIT % 36.3*   PLATELETS 10*3/mm3 233             Results from last 7 days   Lab Units 03/18/20  0429   MAGNESIUM mg/dL 2.0         I reviewed the patient's new clinical results.  I personally viewed and interpreted the patient's EKG/Telemetry data        Medication Review:   Meds reviewed         Assessment/Plan  1.  54-year-old gentleman with paroxysmal atrial fibrillation, normal left ventricular systolic function on echo August 2017.  [id number] score is 1.  had prolonged QTC on 200 mg BID.  QTc back to normal on 20 mg a day continue the same.  We will see in in the office in 1 to 2 weeks.  Hopefully get him off amiodarone in 6 to 8 weeks.  We will see PRN and see as an " outpatient.  2.  Acute pancreatitis GI following to try clear liquids if he fails will need NG   3.  Hypertension follow his pressure  has been elevated we previously increased his metoprolol. Would reassess outpatient.   4.  Obstructive sleep apnea has home pap here  5.   Normal perfusion stress test August 2017- no angina   6. Fever 3/16/2020 workup per admitting- resolved        Baldomero Velasquez MD  03/18/20  07:35

## 2020-03-18 NOTE — PLAN OF CARE
Problem: Patient Care Overview  Goal: Plan of Care Review  3/18/2020 0320 by Shankar Almaguer, RN  Outcome: Ongoing (interventions implemented as appropriate)  Flowsheets (Taken 3/18/2020 0318)  Progress: improving  Outcome Summary: Pt A & O, up ad gaye in room. C/o pain x1, treated with PRN pain meds. BP monitored. Medicated per orders. No s/s of distress at this time, VSS, will cont to monitor.     Problem: Pain, Chronic (Adult)  Goal: Acceptable Pain/Comfort Level and Functional Ability  Outcome: Ongoing (interventions implemented as appropriate)  Flowsheets (Taken 3/15/2020 1608 by Huyen Reaves, RN)  Acceptable Pain/Comfort Level and Functional Ability: making progress toward outcome    Problem: Pancreatitis, Acute/Chronic (Adult)  Goal: Signs and Symptoms of Listed Potential Problems Will be Absent, Minimized or Managed (Pancreatitis, Acute/Chronic)  Outcome: Ongoing (interventions implemented as appropriate)  Flowsheets  Taken 3/13/2020 0351 by Navjot Mcdonald RN  Problems Assessed (Pancreatitis): all  Taken 3/14/2020 0334 by Shankar Almaguer, RN  Problems Present (Pancreatitis): diarrhea;pain     Problem: Skin Injury Risk (Adult)  Goal: Identify Related Risk Factors and Signs and Symptoms  Outcome: Ongoing (interventions implemented as appropriate)  Flowsheets (Taken 3/18/2020 0320)  Related Risk Factors (Skin Injury Risk): body weight extremes; edema     Problem: Fall Risk (Adult)  Goal: Identify Related Risk Factors and Signs and Symptoms  Outcome: Ongoing (interventions implemented as appropriate)     Problem: Fall Risk (Adult)  Goal: Absence of Fall  Outcome: Ongoing (interventions implemented as appropriate)  Flowsheets (Taken 3/18/2020 0320)  Absence of Fall: making progress toward outcome

## 2020-03-19 VITALS
WEIGHT: 265.2 LBS | DIASTOLIC BLOOD PRESSURE: 64 MMHG | HEART RATE: 71 BPM | RESPIRATION RATE: 18 BRPM | SYSTOLIC BLOOD PRESSURE: 117 MMHG | HEIGHT: 71 IN | OXYGEN SATURATION: 97 % | TEMPERATURE: 98.8 F | BODY MASS INDEX: 37.13 KG/M2

## 2020-03-19 PROBLEM — M54.9 BACK PAIN: Status: RESOLVED | Noted: 2020-03-08 | Resolved: 2020-03-19

## 2020-03-19 PROBLEM — R50.9 FEVER: Status: RESOLVED | Noted: 2020-03-16 | Resolved: 2020-03-19

## 2020-03-19 PROBLEM — K59.00 ACUTE CONSTIPATION: Status: RESOLVED | Noted: 2020-03-10 | Resolved: 2020-03-19

## 2020-03-19 LAB
ANION GAP SERPL CALCULATED.3IONS-SCNC: 14.2 MMOL/L (ref 5–15)
BASOPHILS # BLD AUTO: 0.04 10*3/MM3 (ref 0–0.2)
BASOPHILS NFR BLD AUTO: 0.4 % (ref 0–1.5)
BUN BLD-MCNC: 9 MG/DL (ref 6–20)
BUN/CREAT SERPL: 9.2 (ref 7–25)
CALCIUM SPEC-SCNC: 9 MG/DL (ref 8.6–10.5)
CHLORIDE SERPL-SCNC: 99 MMOL/L (ref 98–107)
CO2 SERPL-SCNC: 26.8 MMOL/L (ref 22–29)
CREAT BLD-MCNC: 0.98 MG/DL (ref 0.76–1.27)
DEPRECATED RDW RBC AUTO: 40.3 FL (ref 37–54)
EOSINOPHIL # BLD AUTO: 0.26 10*3/MM3 (ref 0–0.4)
EOSINOPHIL NFR BLD AUTO: 2.7 % (ref 0.3–6.2)
ERYTHROCYTE [DISTWIDTH] IN BLOOD BY AUTOMATED COUNT: 12.1 % (ref 12.3–15.4)
GFR SERPL CREATININE-BSD FRML MDRD: 80 ML/MIN/1.73
GLUCOSE BLD-MCNC: 152 MG/DL (ref 65–99)
GLUCOSE BLDC GLUCOMTR-MCNC: 135 MG/DL (ref 70–130)
GLUCOSE BLDC GLUCOMTR-MCNC: 162 MG/DL (ref 70–130)
HCT VFR BLD AUTO: 40.8 % (ref 37.5–51)
HGB BLD-MCNC: 13.6 G/DL (ref 13–17.7)
IMM GRANULOCYTES # BLD AUTO: 0.14 10*3/MM3 (ref 0–0.05)
IMM GRANULOCYTES NFR BLD AUTO: 1.4 % (ref 0–0.5)
LYMPHOCYTES # BLD AUTO: 1.65 10*3/MM3 (ref 0.7–3.1)
LYMPHOCYTES NFR BLD AUTO: 16.9 % (ref 19.6–45.3)
MCH RBC QN AUTO: 30.4 PG (ref 26.6–33)
MCHC RBC AUTO-ENTMCNC: 33.3 G/DL (ref 31.5–35.7)
MCV RBC AUTO: 91.3 FL (ref 79–97)
MONOCYTES # BLD AUTO: 0.98 10*3/MM3 (ref 0.1–0.9)
MONOCYTES NFR BLD AUTO: 10 % (ref 5–12)
NEUTROPHILS # BLD AUTO: 6.72 10*3/MM3 (ref 1.7–7)
NEUTROPHILS NFR BLD AUTO: 68.6 % (ref 42.7–76)
NRBC BLD AUTO-RTO: 0 /100 WBC (ref 0–0.2)
PLATELET # BLD AUTO: 275 10*3/MM3 (ref 140–450)
PMV BLD AUTO: 8.5 FL (ref 6–12)
POTASSIUM BLD-SCNC: 3.9 MMOL/L (ref 3.5–5.2)
RBC # BLD AUTO: 4.47 10*6/MM3 (ref 4.14–5.8)
SODIUM BLD-SCNC: 140 MMOL/L (ref 136–145)
WBC NRBC COR # BLD: 9.79 10*3/MM3 (ref 3.4–10.8)

## 2020-03-19 PROCEDURE — 85025 COMPLETE CBC W/AUTO DIFF WBC: CPT | Performed by: INTERNAL MEDICINE

## 2020-03-19 PROCEDURE — 99231 SBSQ HOSP IP/OBS SF/LOW 25: CPT | Performed by: INTERNAL MEDICINE

## 2020-03-19 PROCEDURE — 82962 GLUCOSE BLOOD TEST: CPT

## 2020-03-19 PROCEDURE — 80048 BASIC METABOLIC PNL TOTAL CA: CPT | Performed by: INTERNAL MEDICINE

## 2020-03-19 PROCEDURE — 63710000001 INSULIN LISPRO (HUMAN) PER 5 UNITS: Performed by: HOSPITALIST

## 2020-03-19 RX ORDER — CEPHALEXIN 500 MG/1
500 CAPSULE ORAL EVERY 12 HOURS SCHEDULED
Qty: 9 CAPSULE | Refills: 0 | Status: SHIPPED | OUTPATIENT
Start: 2020-03-19 | End: 2020-03-24

## 2020-03-19 RX ORDER — LANCETS 28 GAUGE
EACH MISCELLANEOUS
Qty: 100 EACH | Refills: 1 | Status: SHIPPED | OUTPATIENT
Start: 2020-03-19

## 2020-03-19 RX ORDER — AMOXICILLIN 250 MG
2 CAPSULE ORAL 2 TIMES DAILY
Qty: 60 TABLET | Refills: 0 | Status: CANCELLED | OUTPATIENT
Start: 2020-03-19

## 2020-03-19 RX ORDER — CEPHALEXIN 500 MG/1
500 CAPSULE ORAL EVERY 12 HOURS SCHEDULED
Status: DISCONTINUED | OUTPATIENT
Start: 2020-03-19 | End: 2020-03-19 | Stop reason: HOSPADM

## 2020-03-19 RX ORDER — AMIODARONE HYDROCHLORIDE 200 MG/1
200 TABLET ORAL
Qty: 30 TABLET | Refills: 0 | Status: SHIPPED | OUTPATIENT
Start: 2020-03-19 | End: 2020-03-19

## 2020-03-19 RX ORDER — METOPROLOL SUCCINATE 100 MG/1
100 TABLET, EXTENDED RELEASE ORAL 2 TIMES DAILY
Qty: 60 TABLET | Refills: 1 | Status: SHIPPED | OUTPATIENT
Start: 2020-03-19 | End: 2020-04-15 | Stop reason: SDUPTHER

## 2020-03-19 RX ORDER — ACETAMINOPHEN 325 MG/1
650 TABLET ORAL EVERY 4 HOURS PRN
Start: 2020-03-19

## 2020-03-19 RX ORDER — AMIODARONE HYDROCHLORIDE 200 MG/1
200 TABLET ORAL
Qty: 30 TABLET | Refills: 0 | Status: SHIPPED | OUTPATIENT
Start: 2020-03-19 | End: 2020-04-15

## 2020-03-19 RX ORDER — CEPHALEXIN 500 MG/1
500 CAPSULE ORAL EVERY 12 HOURS SCHEDULED
Qty: 9 CAPSULE | Refills: 0 | Status: SHIPPED | OUTPATIENT
Start: 2020-03-19 | End: 2020-03-19

## 2020-03-19 RX ORDER — METOPROLOL SUCCINATE 100 MG/1
100 TABLET, EXTENDED RELEASE ORAL 2 TIMES DAILY
Qty: 60 TABLET | Refills: 1 | Status: SHIPPED | OUTPATIENT
Start: 2020-03-19 | End: 2020-03-19

## 2020-03-19 RX ORDER — BLOOD-GLUCOSE METER
1 KIT MISCELLANEOUS AS NEEDED
Qty: 1 EACH | Refills: 0 | Status: SHIPPED | OUTPATIENT
Start: 2020-03-19 | End: 2021-03-19

## 2020-03-19 RX ADMIN — METFORMIN HYDROCHLORIDE 850 MG: 850 TABLET, FILM COATED ORAL at 07:22

## 2020-03-19 RX ADMIN — POTASSIUM CHLORIDE 40 MEQ: 10 CAPSULE, COATED, EXTENDED RELEASE ORAL at 07:23

## 2020-03-19 RX ADMIN — INSULIN LISPRO 2 UNITS: 100 INJECTION, SOLUTION INTRAVENOUS; SUBCUTANEOUS at 07:59

## 2020-03-19 RX ADMIN — METOPROLOL SUCCINATE 100 MG: 100 TABLET, FILM COATED, EXTENDED RELEASE ORAL at 07:23

## 2020-03-19 RX ADMIN — APIXABAN 5 MG: 5 TABLET, FILM COATED ORAL at 10:55

## 2020-03-19 RX ADMIN — ACETAMINOPHEN 650 MG: 325 TABLET, FILM COATED ORAL at 07:21

## 2020-03-19 RX ADMIN — SODIUM CHLORIDE, PRESERVATIVE FREE 10 ML: 5 INJECTION INTRAVENOUS at 07:24

## 2020-03-19 RX ADMIN — CYCLOBENZAPRINE 10 MG: 10 TABLET, FILM COATED ORAL at 07:22

## 2020-03-19 RX ADMIN — POLYETHYLENE GLYCOL 3350 34 G: 17 POWDER, FOR SOLUTION ORAL at 07:29

## 2020-03-19 RX ADMIN — AMIODARONE HYDROCHLORIDE 200 MG: 200 TABLET ORAL at 07:22

## 2020-03-19 RX ADMIN — CEPHALEXIN 500 MG: 500 CAPSULE ORAL at 10:55

## 2020-03-19 NOTE — PROGRESS NOTES
[facility]  Inpatient Progress Note    Reason for Follow Up: Acute pancreatitis    Subjective     Interval History:   He is tolerating low fat diet.  He is ambulating.  His LE edema is much improved.    Current Facility-Administered Medications:   •  acetaminophen (TYLENOL) tablet 650 mg, 650 mg, Oral, Q4H PRN, 650 mg at 03/19/20 0721 **OR** acetaminophen (TYLENOL) 160 MG/5ML solution 650 mg, 650 mg, Oral, Q4H PRN **OR** acetaminophen (TYLENOL) suppository 650 mg, 650 mg, Rectal, Q4H PRN, Dedra Ruff APRN  •  amiodarone (PACERONE) tablet 200 mg, 200 mg, Oral, Q24H, Gabby Teresa APRN, 200 mg at 03/19/20 0722  •  apixaban (ELIQUIS) tablet 5 mg, 5 mg, Oral, Q12H, Emerita Elizabeth APRN, 5 mg at 03/19/20 1055  •  bisacodyl (DULCOLAX) EC tablet 5 mg, 5 mg, Oral, Daily PRN, Dedra Ruff APRN, 5 mg at 03/09/20 2358  •  bisacodyl (DULCOLAX) suppository 10 mg, 10 mg, Rectal, Daily PRN, Emerita Elizabeth APRN  •  calcium carbonate (TUMS) chewable tablet 500 mg (200 mg elemental), 2 tablet, Oral, BID PRN, Dedra Ruff APRN, 2 tablet at 03/18/20 2325  •  cephalexin (KEFLEX) capsule 500 mg, 500 mg, Oral, Q12H, Emerita Elizabeth APRN, 500 mg at 03/19/20 1055  •  cyclobenzaprine (FLEXERIL) tablet 10 mg, 10 mg, Oral, TID PRN, Kei Arnold MD, 10 mg at 03/19/20 0722  •  dextrose (D50W) 25 g/ 50mL Intravenous Solution 25 g, 25 g, Intravenous, Q15 Min PRN, Kei Arnold MD  •  dextrose (GLUTOSE) oral gel 15 g, 15 g, Oral, Q15 Min PRN, Kei Arnold MD  •  glucagon (human recombinant) (GLUCAGEN DIAGNOSTIC) injection 1 mg, 1 mg, Subcutaneous, Q15 Min PRN, Kei Arnold MD  •  hydrALAZINE (APRESOLINE) injection 10 mg, 10 mg, Intravenous, Q6H PRN, Stepan Pacheco MD, 10 mg at 03/16/20 1226  •  insulin lispro (humaLOG) injection 0-7 Units, 0-7 Units, Subcutaneous, 4x Daily With Meals & Nightly, Kei Arnold MD, 2 Units at 03/19/20 1663  •  lidocaine (LIDODERM) 5 % 1 patch, 1  patch, Transdermal, Q12H, Norberto Simon MD, 1 patch at 03/18/20 1750  •  metFORMIN (GLUCOPHAGE) tablet 850 mg, 850 mg, Oral, Daily With Breakfast, Emerita Elizabeth, APRN, 850 mg at 03/19/20 0722  •  metoprolol succinate XL (TOPROL-XL) 24 hr tablet 100 mg, 100 mg, Oral, BID, Malick, Gabby, APRN, 100 mg at 03/19/20 0723  •  metoprolol tartrate (LOPRESSOR) injection 5 mg, 5 mg, Intravenous, Q5 Min PRN, Venkatesh Teresayssa, APRN  •  nitroglycerin (NITROSTAT) SL tablet 0.4 mg, 0.4 mg, Sublingual, Q5 Min PRN, Dedra Ruff APRYESIKA  •  ondansetron (ZOFRAN) tablet 4 mg, 4 mg, Oral, Q6H PRN **OR** ondansetron (ZOFRAN) injection 4 mg, 4 mg, Intravenous, Q6H PRN, Dedra Ruff APRN, 4 mg at 03/11/20 0249  •  polyethylene glycol (MIRALAX) packet 17 g, 17 g, Oral, Daily PRN, Norberto Simon MD, 17 g at 03/10/20 1521  •  polyethylene glycol (MIRALAX) packet 34 g, 34 g, Oral, Daily, Emerita Elizabeth, APRN, 34 g at 03/19/20 0729  •  potassium chloride (MICRO-K) CR capsule 40 mEq, 40 mEq, Oral, PRN, 40 mEq at 03/18/20 0657 **OR** potassium chloride (KLOR-CON) packet 40 mEq, 40 mEq, Oral, PRN **OR** potassium chloride 10 mEq in 100 mL IVPB, 10 mEq, Intravenous, Q1H PRN, Norberto Simon MD  •  potassium chloride (MICRO-K) CR capsule 40 mEq, 40 mEq, Oral, Daily, Malick, Gabby, APRN, 40 mEq at 03/19/20 0723  •  promethazine (PHENERGAN) injection 12.5 mg, 12.5 mg, Intravenous, Q6H PRN, Kei Arnold MD, 12.5 mg at 03/08/20 1423  •  senna-docusate sodium (SENOKOT-S) 8.6-50 MG tablet 2 tablet, 2 tablet, Oral, BID, Tate Mckoy MD, 2 tablet at 03/18/20 0729  •  sodium chloride 0.9 % flush 10 mL, 10 mL, Intravenous, PRN, Charmaine Vogel MD  •  sodium chloride 0.9 % flush 10 mL, 10 mL, Intravenous, Q12H, Dedra Ruff APRN, 10 mL at 03/19/20 0724  •  sodium chloride 0.9 % flush 10 mL, 10 mL, Intravenous, PRN, Dedra Ruff APRN, 10 mL at 03/14/20 1254  Review of Systems:      GI: negative    Objective     Vital Signs  Temp:  [98.7 °F (37.1 °C)-100.8 °F (38.2 °C)] 98.8 °F (37.1 °C)  Heart Rate:  [71-86] 71  Resp:  [18] 18  BP: (117-158)/(64-87) 117/64  Body mass index is 36.99 kg/m².    Intake/Output Summary (Last 24 hours) at 3/19/2020 1123  Last data filed at 3/19/2020 0520  Gross per 24 hour   Intake 210 ml   Output 400 ml   Net -190 ml     No intake/output data recorded.     Physical Exam:   General: patient awake, alert and cooperative   Abdomen: soft, nontender, nondistended; normal bowel sounds   Extremities: trace LE edema   Psychiatric: Normal mood and behavior; memory intact     Results Review:     I reviewed the patient's new clinical results.    Results from last 7 days   Lab Units 03/19/20  0944 03/18/20  0429 03/17/20  0621   WBC 10*3/mm3 9.79 9.27 11.64*   HEMOGLOBIN g/dL 13.6 12.5* 12.6*   HEMATOCRIT % 40.8 36.3* 36.4*   PLATELETS 10*3/mm3 275 233 221     Results from last 7 days   Lab Units 03/19/20  0944 03/18/20  0429 03/17/20  1415  03/15/20  0608  03/13/20  0435   SODIUM mmol/L 140 137 132*   < > 133*   < > 139   POTASSIUM mmol/L 3.9 3.6 4.0   < > 3.8   < > 3.6   CHLORIDE mmol/L 99 98 95*   < > 99   < > 102   CO2 mmol/L 26.8 25.2 26.1   < > 23.5   < > 24.9   BUN mg/dL 9 9 10   < > 12   < > 20   CREATININE mg/dL 0.98 0.80 0.90   < > 0.74*   < > 0.82   CALCIUM mg/dL 9.0 8.3* 8.2*   < > 8.0*   < > 7.7*   BILIRUBIN mg/dL  --  0.4  --   --  0.6  --  0.5   ALK PHOS U/L  --  57  --   --  66  --  56   ALT (SGPT) U/L  --  37  --   --  30  --  28   AST (SGOT) U/L  --  41*  --   --  33  --  26   GLUCOSE mg/dL 152* 132* 129*   < > 103*   < > 140*    < > = values in this interval not displayed.         Lab Results   Lab Value Date/Time    LIPASE 42 03/16/2020 0420    LIPASE 39 03/10/2020 0558    LIPASE 170 (H) 03/09/2020 0427    LIPASE 561 (H) 03/08/2020 0600    LIPASE >3,000 (H) 03/07/2020 0401       Radiology:  XR Chest 1 View   Final Result   No evidence for active disease in  the chest.       This report was finalized on 3/16/2020 3:06 PM by Dr. Noman Huang M.D.          CT Abdomen Pelvis With Contrast   Final Result   1. Findings consistent with moderately severe inflammatory change   surrounding the pancreas consistent with pancreatitis. This finding has   progressed since the previous study of 03/07/2020.   2. No pancreatic masses or pseudocysts are seen.   3. Small amount of free fluid in the abdomen and pelvis.   4. Status post cholecystectomy.   5. Small bilateral pleural effusions and bibasilar atelectasis.   6. Several small mesenteric nodes. These could be reassessed on a   follow-up study.               Radiation dose reduction techniques were utilized, including automated   exposure control and exposure modulation based on body size.       This report was finalized on 3/12/2020 8:36 AM by Dr. Kilo Owesn M.D.          XR Chest PA & Lateral   Final Result   Small likely atelectasis or scarring.       This report was finalized on 3/9/2020 2:16 PM by Dr. Roger Howe M.D.          MRI abdomen w wo contrast mrcp   Final Result   Markedly limited exam as the patient could not cooperate   with breath-hold procedure. Both pre and postcontrast images were   markedly limited.   MR findings of acute pancreatitis. Status post cholecystectomy. No   evidence of choledocholithiasis. No dilatation of the pancreatic duct.   Additional details as above.       This report was finalized on 3/9/2020 12:47 PM by Dr. Bernadine Atwood M.D.          CT Abdomen Pelvis With Contrast   Final Result       1. Inflammatory stranding is seen surrounding the pancreas, suspicious   for pancreatitis. While there is some fluid identified within the upper   abdomen, no organized peripancreatic collections are seen. The pancreas   enhances normally.   2. Area of decreased attenuation involving the inferior aspect of the   spleen. Small splenic infarction is not excluded.       Radiation  dose reduction techniques were utilized, including automated   exposure control and exposure modulation based on body size.       This report was finalized on 3/7/2020 5:42 AM by Dr. Brianna Soliz M.D.              Assessment/Plan     Patient Active Problem List   Diagnosis   • Acute pancreatitis   • Essential hypertension   • Hyperglycemia   • Sleep apnea   • Hypercholesteremia   • Asthma   • Atrial fibrillation (CMS/HCC)   • Morbid obesity (CMS/HCC)   • Thrombophlebitis   • Type 2 diabetes mellitus without complication, without long-term current use of insulin (CMS/HCC)       Assessment:     Acute pancreatitis    Morbid obesity (CMS/HCC)    Acute constipation     Plan:   OK for DC home from GI standpoint  Will arrange for office f/u in 4-6 weeks      I discussed the patients findings and my recommendations with patient and nursing staff.          Arnel Eid M.D.  [facility]  [address] [place] [address]  Office: [phone]

## 2020-03-19 NOTE — PROGRESS NOTES
Patient was counseled via telephone on Eliquis including the following.  RN is providing patient printed patient specific information regarding Apixaban.       1) Eliquis's indication, mechanism of action, and dosing  2) Enforced the importance of taking Eliquis as instructed every day and that it is a twice a day medication.  3) Explained possible side effects of Eliquis therapy, including increased risk of bleeding, s/sx of bleeding, and s/sx of any additional clots/PE/CVA.   4) Emphasized the importance of going to the emergency room if any of the following occur: Falling and hitting your head; noticing bright red blood in urine or dark/tarry stools; vomiting up blood or vomit has a coffee-ground like texture; coughing up blood  5) Discussed the importance of speaking with physician/pharmacist when starting any new medications to check for drug interactions with Eliquis  6) Discussed the importance of informing any surgeon or proceduralist that you are on Eliquis and may need to go off prior to the procedure (including spinal/epidural procedures)  7) Discussed what to do about a missed dose (Take as soon as you remember and if it is closer to the time for your next dose, skip the missed dose and go back to your normal time; DO NOT double up doses)  8) Instructed the pt not to take or discontinue any medications without informing his physician/pharmacist     I also explained to the patient that this medication may have a high copay associated with it and to let the physician know if it is unaffordable.      Patient expressed understanding and had no further questions.    Thanks, Travis Irizarry, KianD, BCPS

## 2020-03-19 NOTE — PROGRESS NOTES
Continued Stay Note  [facility]     Patient Name: Cesar Vann  MRN: [id number]  Today's Date: 3/19/2020    Admit Date: 3/7/2020    Discharge Plan     Row Name 03/19/20 0951       Plan    Plan  Plan home.  OLIVIER Sanchez RN    Patient/Family in Agreement with Plan  yes    Plan Comments  Spoke with pt at bedside.  Per [facility] pt's first month of Eliquis will be free.  Pt with [employer].  Pt given the $10/month co pay card for Eliquis.  Pt denies any other needs.  Plan home.   OLIVIER Sanchez RN        Discharge Codes    No documentation.       Expected Discharge Date and Time     Expected Discharge Date Expected Discharge Time    Mar 19, 2020             Danita Sanchez, RN

## 2020-03-19 NOTE — DISCHARGE SUMMARY
Discharge Summary    Patient Name: Cesar Vann  Age/Sex: 54 y.o. male  : 1965  MRN: [id number]  No care team member to display    Hospital Course     Date of Admit: 3/7/2020  Date of Discharge:  20   Discharge Condition: Stable    Discharge Diagnoses:    Acute pancreatitis    Essential hypertension    Hyperglycemia    Sleep apnea    Hypercholesteremia    Asthma    Atrial fibrillation (CMS/HCC)    Morbid obesity (CMS/HCC)    Thrombophlebitis    Type 2 diabetes mellitus without complication, without long-term current use of insulin (CMS/HCC)    Present on Admission:  • Acute pancreatitis      Hospital Course:   Cesar Vann presented to [facility] with complaints of abdominal pain, nausea and vomiting. Please see the admitting history and physical for further details. He was found to have acute pancreatitis and was admitted to the hospital for further evaluation and treatment. Gastroenterology was consulted and patient was initially treated with pain medications and IV fluids. He continued to have abdominal pain and repeat CT on 3/11/20 showed worsening inflammation and mild mesenteric lymph node. IgG was ok, triglycerides normal and no ductal abnormalities noted on MRCP. CA 19-9 was mildly elevated and would recommend repeat as outpatient. Lipase returned to normal and pain resolved.     On 3/8/20 he went into atrial fibrillation with RVR and cardiology was consulted. His toprol was increased to 100 mg BID and amiodarone was added. His QTC did increase at one point and remained stable after being reduced to 200 mg daily. He converted back and remained in normal sinus rhythm. Given his Chads2 Vasc of 2 it was decided to start him on Eliquis 5 mg BID and will follow up with cardiology in 1 month. He did have volume overload and elevated blood pressures that improved with IV lasix. Echocardiogram was unremarkable. BP and edema improved and no further diuretics were  recommended at this time.     During this stay, he was noted to have hyperglycemia and A1c was up to 8.8. He was given education from diabetes educator and nutrition. He was started on metformin 850 mg daily and recommended to check glucose daily and keep a log.     He had intermittent fever during this stay and leukocytosis resolved, but still had a low grade fever. Given his right arm thrombophlebitis he was started on Keflex and was encouraged to continue elevation and warm compresses.     Overall patient is stable and ready for discharge home. CCP assisted patient in finding a PCP and recommended follow up as soon as possible.     Consults:   IP CONSULT TO HOSPITALIST  IP CONSULT TO GASTROENTEROLOGY  IP CONSULT TO DIABETES EDUCATOR  IP CONSULT TO NUTRITION SERVICES  IP CONSULT TO CARDIOLOGY  IP CONSULT TO NUTRITION SERVICES  IP CONSULT TO CASE MANAGEMENT     Significant Discharge Diagnostics   Procedures Performed:         Pertinent Lab Results:  Results from last 7 days   Lab Units 03/18/20  0429 03/17/20  1415 03/16/20  0420 03/15/20  0608 03/14/20  0526 03/13/20  0435   SODIUM mmol/L 137 132* 135* 133* 135* 139   POTASSIUM mmol/L 3.6 4.0 3.7 3.8 3.6 3.6   CHLORIDE mmol/L 98 95* 96* 99 98 102   CO2 mmol/L 25.2 26.1 24.1 23.5 25.0 24.9   BUN mg/dL 9 10 10 12 17 20   CREATININE mg/dL 0.80 0.90 0.75* 0.74* 0.76 0.82   GLUCOSE mg/dL 132* 129* 103* 103* 134* 140*   CALCIUM mg/dL 8.3* 8.2* 8.4* 8.0* 8.3* 7.7*   AST (SGOT) U/L 41*  --   --  33  --  26   ALT (SGPT) U/L 37  --   --  30  --  28         Results from last 7 days   Lab Units 03/19/20  0944 03/18/20  0429 03/17/20  0621  03/16/20  0420 03/15/20  0608 03/14/20  0526 03/13/20  0435   WBC 10*3/mm3 9.79 9.27 11.64*  --  14.59* 13.77* 13.45* 10.21   HEMOGLOBIN g/dL 13.6 12.5* 12.6*  --  12.8* 12.6* 12.7* 12.2*   HEMATOCRIT % 40.8 36.3* 36.4*  --  36.7* 36.0* 38.1 36.3*   PLATELETS 10*3/mm3 275 233 221  --  203 214 224 183   MCV fL 91.3 89.6 90.3   --  90.8 90.7 91.8 89.9   MCH pg 30.4 30.9 31.3  --  31.7 31.7 30.6 30.2   MCHC g/dL 33.3 34.4 34.6  --  34.9 35.0 33.3 33.6   RDW % 12.1* 12.3 12.3  --  12.6 12.6 12.9 12.7   RDW-SD fl 40.3 40.2 40.3  --  41.7 42.1 42.8 42.1   MPV fL 8.5 8.6 8.7  --  8.9 8.6 8.8 9.0   NEUTROPHIL % % 68.6  --  76.9*  --   --   --   --  74.0   LYMPHOCYTE % % 16.9*  --  11.2*  --   --   --   --  9.7*   MONOCYTES % % 10.0  --  8.3  --   --   --   --  11.5   EOSINOPHIL % % 2.7  --  1.3  --   --   --   --  2.2   BASOPHIL % % 0.4  --  0.3  --   --   --   --  0.5   IMM GRAN % % 1.4*  --  2.0*   < >  --   --   --   --    NEUTROS ABS 10*3/mm3 6.72  --  8.95*  --   --   --   --  7.57*   LYMPHS ABS 10*3/mm3 1.65  --  1.30  --   --   --   --  0.99   MONOS ABS 10*3/mm3 0.98*  --  0.97*  --   --   --   --  1.17*   EOS ABS 10*3/mm3 0.26  --  0.15  --   --   --   --  0.22   BASOS ABS 10*3/mm3 0.04  --  0.04  --   --   --   --  0.05   IMMATURE GRANS (ABS) 10*3/mm3 0.14*  --  0.23*   < >  --   --   --   --    NRBC /100 WBC 0.0  --  0.0   < >  --   --   --   --     < > = values in this interval not displayed.         Results from last 7 days   Lab Units 03/18/20  0429   MAGNESIUM mg/dL 2.0           Invalid input(s): LDLCALC  Results from last 7 days   Lab Units 03/17/20  1415   PROBNP pg/mL 144.2                     Results from last 7 days   Lab Units 03/16/20  0420   LIPASE U/L 42                       Imaging Results:  Imaging Results (Most Recent)     Procedure Component Value Units Date/Time    XR Chest 1 View [[id number]] Collected:  03/16/20 1505     Updated:  03/16/20 1509    Narrative:       AP PORTABLE UPRIGHT CHEST     HISTORY: Fever and shortness of air.     COMPARISON: PA and lateral chest 03/09/2020.     FINDINGS: Cardiomediastinal silhouette is within normal limits. Lungs  appear clear and there is no evidence for pulmonary edema or pleural  effusion or infiltrate. There is mild elevation of the right  hemidiaphragm. Cardiac monitoring  leads are noted.       Impression:       No evidence for active disease in the chest.     This report was finalized on 3/16/2020 3:06 PM by Dr. Noman Huang M.D.       CT Abdomen Pelvis With Contrast [[id number]] Collected:  03/11/20 1702     Updated:  03/12/20 0839    Narrative:       CT OF THE ABDOMEN AND PELVIS WITH CONTRAST 03/11/2020     HISTORY: Pancreatitis.     Axial images were obtained from the lung bases to the symphysis pubis  after intravenous and oral contrast.     COMPARISON: Previous CT of the abdomen and pelvis dated 03/07/2020 is  compared.     FINDINGS: There are small bilateral pleural effusions with bibasilar  atelectasis. This finding has increased since the previous study.     Gallbladder has been removed. The liver and spleen appear unremarkable.     There is moderately severe inflammatory change surrounding the pancreas.  Overall, the pancreas appears slightly enlarged. No pancreatic masses  are seen. No loculated fluid collections are seen.     The adrenals and kidneys appear unremarkable. Multiple small mesenteric  nodes are seen.     No bowel wall thickening or bowel dilatation is seen. Urinary bladder  and prostate gland appear normal. Small amount of free fluid is seen.       Impression:       1. Findings consistent with moderately severe inflammatory change  surrounding the pancreas consistent with pancreatitis. This finding has  progressed since the previous study of 03/07/2020.  2. No pancreatic masses or pseudocysts are seen.  3. Small amount of free fluid in the abdomen and pelvis.  4. Status post cholecystectomy.  5. Small bilateral pleural effusions and bibasilar atelectasis.  6. Several small mesenteric nodes. These could be reassessed on a  follow-up study.           Radiation dose reduction techniques were utilized, including automated  exposure control and exposure modulation based on body size.     This report was finalized on 3/12/2020 8:36 AM by Dr. Boateng  DAVID Owens.       XR Chest PA & Lateral [[id number]] Collected:  03/09/20 1412     Updated:  03/09/20 1419    Narrative:       XR CHEST PA AND LATERAL-     HISTORY: Male who is 54 years-old,  leukocytosis     TECHNIQUE: Frontal and lateral views of the chest     COMPARISON: None available     FINDINGS: Heart, mediastinum and pulmonary vasculature are unremarkable.  Linear likely atelectasis or scarring anteriorly in the chest on the  lateral view, also at the right base on the frontal view. No pleural  effusion pneumothorax. No acute osseous process.       Impression:       Small likely atelectasis or scarring.     This report was finalized on 3/9/2020 2:16 PM by Dr. Roger Howe M.D.       MRI abdomen w wo contrast mrcp [[id number]] Collected:  03/09/20 0953     Updated:  03/09/20 1251    Narrative:       MRI OF THE LIVER WITH AND WITHOUT CONTRAST, MRCP     HISTORY: Pancreatitis. Abnormal CT abdomen.     TECHNIQUE: MRI of the abdomen was performed utilizing a dedicated liver  protocol. Axial 2-D FIESTA, in and out of phase FSPGR, axial  fat-saturated T2 imaging was acquired. Axial thin section precontrast  and dynamic enhanced postcontrast T1 weighted LAVA imaging was acquired.  Thick slab coronal MRCP imaging was acquired in multiple obliquities  through the biliary tree. Coronal thin section fat saturated T2 and 3-D  MRCP imaging was acquired.      COMPARISON: CT abdomen and pelvis from 03/07/2020.     FINDINGS: This is a limited exam as the patient could not cooperate with  breath-hold. The images are limited secondary to motion. The gallbladder  is surgically absent. No intrahepatic biliary dilatation is seen. The  common bile duct measures approximately 7 mm within its proximal portion  and demonstrates normal distal tapering without definable intraluminal  filling defect. The pancreatic duct is not dilated. Pancreatic duct  within the region of the neck is not well imaged which may be secondary  to  external compression from pancreatic edema related to pancreatitis.     There is diffuse increased signal on T2-weighted images within the  pancreas consistent with edema. There is peripancreatic fluid and  stranding demonstrated and these findings are consistent with acute  pancreatitis. There are bilateral trace layering pleural effusions.  There is very limited imaging on postcontrast imaging and no significant  abnormality is detected within confines of this study.       Impression:       Markedly limited exam as the patient could not cooperate  with breath-hold procedure. Both pre and postcontrast images were  markedly limited.  MR findings of acute pancreatitis. Status post cholecystectomy. No  evidence of choledocholithiasis. No dilatation of the pancreatic duct.  Additional details as above.     This report was finalized on 3/9/2020 12:47 PM by Dr. Bernadine Atwood M.D.       CT Abdomen Pelvis With Contrast [[id number]] Collected:  03/07/20 0533     Updated:  03/07/20 0545    Narrative:       CT OF THE ABDOMEN AND PELVIS WITH CONTRAST     HISTORY: Moderate mid abdominal and back pain     COMPARISON: None available.     TECHNIQUE: Axial CT imaging was obtained from the dome the diaphragm to  the symphysis pubis. IV contrast was administered.     FINDINGS:  Images through the lung bases demonstrate mild dependent atelectasis. No  focal hepatic lesions are seen. The patient is noted to have  inflammatory stranding around the pancreas. The appearance is suspicious  for pancreatitis. While there is some fluid identified in the mesentery,  no organized peripancreatic collections are seen. There is no pancreatic  ductal dilatation. This process is not resulting in any gastric outlet  obstruction. The pancreas enhances normally. The main portal vein is  patent, as are the splenic and superior mesenteric veins. There is an  area of decreased attenuation which is seen involving the inferior  aspect of the spleen. Exact  etiology is uncertain. It may represent a  lesion such as a cyst, but it does have a somewhat peripheral and  wedge-shaped configuration which may represent splenic infarction.  Again, however both the splenic artery and splenic vein appear to be  patent. The gallbladder is surgically absent. The adrenal glands are  within normal limits. Kidneys enhance symmetrically. There is a simple  appearing left renal cyst. No additional follow-up is necessary. The  appendix appears unremarkable. There is no hydronephrosis. No distal  ureteral or bladder stones are seen. Urinary bladder and prostate gland  are normal. There is no evidence of mechanical bowel obstruction. No  acute osseous abnormalities are seen.       Impression:          1. Inflammatory stranding is seen surrounding the pancreas, suspicious  for pancreatitis. While there is some fluid identified within the upper  abdomen, no organized peripancreatic collections are seen. The pancreas  enhances normally.  2. Area of decreased attenuation involving the inferior aspect of the  spleen. Small splenic infarction is not excluded.     Radiation dose reduction techniques were utilized, including automated  exposure control and exposure modulation based on body size.     This report was finalized on 3/7/2020 5:42 AM by Dr. Brianna Soliz M.D.               Objective:   Temp:  [98.7 °F (37.1 °C)-100.8 °F (38.2 °C)] 98.8 °F (37.1 °C)  Heart Rate:  [71-86] 71  Resp:  [18] 18  BP: (117-158)/(64-87) 117/64   SpO2:  [97 %] 97 %  on    Device (Oxygen Therapy): room air    Intake/Output Summary (Last 24 hours) at 3/19/2020 1009  Last data filed at 3/19/2020 0520  Gross per 24 hour   Intake 210 ml   Output 400 ml   Net -190 ml     Body mass index is 36.99 kg/m².      03/18/20  0458 03/18/20  0959 03/19/20  0526   Weight: 122 kg (270 lb) 122 kg (270 lb) 120 kg (265 lb 3.2 oz)       Physical Exam   Constitutional: He is oriented to person, place, and time. He appears  well-developed and well-nourished. No distress.   Morbidly obese   HENT:   Head: Normocephalic and atraumatic.   Mouth/Throat: No oropharyngeal exudate.   Eyes: Conjunctivae are normal. No scleral icterus.   Neck: Neck supple.   Cardiovascular: Normal rate, regular rhythm and intact distal pulses.   No murmur heard.  Pulmonary/Chest: Effort normal and breath sounds normal. No respiratory distress. He has no wheezes.   Abdominal: Bowel sounds are normal. There is no tenderness.   Mild dependent edema in RLQ   Musculoskeletal: He exhibits edema (trace BLE).   Neurological: He is alert and oriented to person, place, and time.   Skin: Skin is warm and dry. No rash noted.   Right forearm erythema and induration   Psychiatric: He has a normal mood and affect.   Vitals reviewed.      Discharge Medications and Instructions:     Discharge Medications     Discharge Medications      New Medications      Instructions Start Date   acetaminophen 325 MG tablet  Commonly known as:  TYLENOL   650 mg, Oral, Every 4 Hours PRN      amiodarone 200 MG tablet  Commonly known as:  PACERONE   200 mg, Oral, Every 24 Hours Scheduled      apixaban 5 MG tablet tablet  Commonly known as:  ELIQUIS   5 mg, Oral, Every 12 Hours Scheduled      cephalexin 500 MG capsule  Commonly known as:  KEFLEX   500 mg, Oral, Every 12 Hours Scheduled      freestyle lancets   Check glucose once daily      glucose blood test strip   Use as instructed. Check at least once daily      glucose monitoring kit monitoring kit   1 each, Does not apply, As Needed      metFORMIN 850 MG tablet  Commonly known as:  GLUCOPHAGE   850 mg, Oral, Daily With Breakfast   Start Date:  March 20, 2020        Changes to Medications      Instructions Start Date   metoprolol succinate  MG 24 hr tablet  Commonly known as:  TOPROL-XL  What changed:  when to take this   100 mg, Oral, 2 Times Daily         Continue These Medications      Instructions Start Date   MELATONIN PO   Oral,  Nightly PRN         Stop These Medications    ARIPiprazole 20 MG tablet  Commonly known as:  ABILIFY     atorvastatin 40 MG tablet  Commonly known as:  LIPITOR     cyclobenzaprine 10 MG tablet  Commonly known as:  FLEXERIL     mirtazapine 45 MG disintegrating tablet  Commonly known as:  REMERON SOL-TAB     sertraline 100 MG tablet  Commonly known as:  ZOLOFT     warfarin 1 MG tablet  Commonly known as:  COUMADIN     warfarin 3 MG tablet  Commonly known as:  COUMADIN             Medication Reconciliation: Please see electronically completed Med Rec.    Discharge Diet:    Dietary Orders (From admission, onward)     Start     Ordered    03/18/20 1156  Diet Regular; Thin; Low Fat, Consistent Carbohydrate; Low Fat (50gm)  Diet Effective Now     Question Answer Comment   Diet Texture / Consistency Regular    Fluid Consistency Thin    Common Modifiers Low Fat    Common Modifiers Consistent Carbohydrate    Fat Restriction: Low Fat (50gm)        03/18/20 1157                Activity at Discharge:    Activity Instructions     Activity as Tolerated             Discharge disposition: Home    Discharge Instructions and Follow ups:  Follow-up Information     Bing Eduardo APRN Follow up.    Specialty:  Family Medicine  Contact information:  [address] [facility] [id number] [phone]             Baldomero Velasquez MD Follow up.    Specialty:  Cardiology  Contact information:  [address] [facility] [address]  [facility] [id number] [phone]                 Future Appointments   Date Time Provider Department Center   3/25/2020  7:30 AM Roger Man MD [facility] None   4/15/2020  1:00 PM Gabby Teresa APRN [facility] None   5/13/2020 10:00 AM Baldomero Velasquez MD [facility] None       Total time spent discharging patient including evaluation, medication reconciliation, arranging follow up, and post hospitalization instructions and education total time exceeds 30 minutes.      JUICE Alegria  03/19/20  9:46  AM

## 2020-03-19 NOTE — PLAN OF CARE
Problem: Patient Care Overview  Goal: Plan of Care Review  3/19/2020 0356 by Shankar Almaguer, RN  Outcome: Ongoing (interventions implemented as appropriate)  Flowsheets  Taken 3/19/2020 0356  Progress: improving  Taken 3/19/2020 0354  Plan of Care Reviewed With: patient  Outcome Summary: Pt A & O, up ad gaye in room. C/o pain x1, treated with PRN tylenol. BP monitored. Medicated per orders. Poss d/c today. No s/s of distress at this time, VSS, will cont to monitor.     Problem: Arrhythmia/Dysrhythmia (Symptomatic) (Adult)  Goal: Signs and Symptoms of Listed Potential Problems Will be Absent, Minimized or Managed (Arrhythmia/Dysrhythmia)  Outcome: Ongoing (interventions implemented as appropriate)  Flowsheets (Taken 3/19/2020 0356)  Problems Assessed (Arrhythmia/Dysrhythmia): chest pain (angina); situational response  Problems Present (Dysrhythmia): situational response        Problem: Pain, Chronic (Adult)  Goal: Acceptable Pain/Comfort Level and Functional Ability  3/19/2020 0356 by Shankar Almaguer, RN  Outcome: Ongoing (interventions implemented as appropriate)  Flowsheets (Taken 3/19/2020 0356)  Acceptable Pain/Comfort Level and Functional Ability: making progress toward outcome    Problem: Fall Risk (Adult)  Goal: Absence of Fall  Outcome: Ongoing (interventions implemented as appropriate)  Flowsheets (Taken 3/19/2020 0356)  Absence of Fall: making progress toward outcome

## 2020-03-19 NOTE — CONSULTS
Nutrition Services    Patient Name:  Cesar Vann  YOB: 1965  MRN: [id number]  Admit Date:  3/7/2020    Consult for diet education.  Plans for discharge today.  Discussed diabetic diet with patient.  Discussed carb counting.  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  Gave handouts for home use.  RD to follow as needed.    Electronically signed by:  Adrienne Garg RD  03/19/20 13:16

## 2020-03-19 NOTE — PROGRESS NOTES
Case Management Discharge Note      Final Note: Pt discharged home.  OLIVIER Sanchez RN    Provided Post Acute Provider List?: N/A    Destination      No service has been selected for the patient.      Durable Medical Equipment      No service has been selected for the patient.      Dialysis/Infusion      No service has been selected for the patient.      Home Medical Care      No service has been selected for the patient.      Therapy      No service has been selected for the patient.      Community Resources      No service has been selected for the patient.        Transportation Services  Private: Car    Final Discharge Disposition Code: 01 - home or self-care

## 2020-03-19 NOTE — PROGRESS NOTES
Continued Stay Note  [facility]     Patient Name: Cesar Vann  MRN: [id number]  Today's Date: 3/19/2020    Admit Date: 3/7/2020    Discharge Plan     Row Name 03/19/20 1343       Plan    Plan  Plan home.  OLIVIER Sanchez RN    Patient/Family in Agreement with Plan  yes    Plan Comments  Pt does not want to use [facility].  Pt wants to use [facility]). Prescriptions sent to [facility].   Pt provided a free 30 day Eliquis card and $10 /month co pay card.  Pt denies any other needs.  Plan home.   OLIVIER Sanchez RN        Discharge Codes    No documentation.       Expected Discharge Date and Time     Expected Discharge Date Expected Discharge Time    Mar 19, 2020             Danita Sanchez, RN

## 2020-03-20 ENCOUNTER — READMISSION MANAGEMENT (OUTPATIENT)
Dept: CALL CENTER | Facility: HOSPITAL | Age: 55
End: 2020-03-20

## 2020-03-20 NOTE — OUTREACH NOTE
Prep Survey      Responses   [facility] facility patient discharged from?  [place]   Is LACE score < 7 ?  No   Eligibility  Readm Mgmt   Discharge diagnosis  Acute pancreatitis   Does the patient have one of the following disease processes/diagnoses(primary or secondary)?  Other   Does the patient have Home health ordered?  No   Is there a DME ordered?  No   Prep survey completed?  Yes          Ashanti Vyas RN

## 2020-03-25 ENCOUNTER — OFFICE VISIT (OUTPATIENT)
Dept: INTERNAL MEDICINE | Age: 55
End: 2020-03-25

## 2020-03-25 ENCOUNTER — READMISSION MANAGEMENT (OUTPATIENT)
Dept: CALL CENTER | Facility: HOSPITAL | Age: 55
End: 2020-03-25

## 2020-03-25 VITALS
DIASTOLIC BLOOD PRESSURE: 68 MMHG | WEIGHT: 260.6 LBS | SYSTOLIC BLOOD PRESSURE: 124 MMHG | HEIGHT: 71 IN | TEMPERATURE: 96.4 F | BODY MASS INDEX: 36.48 KG/M2 | RESPIRATION RATE: 13 BRPM | HEART RATE: 70 BPM

## 2020-03-25 DIAGNOSIS — I10 ESSENTIAL HYPERTENSION: ICD-10-CM

## 2020-03-25 DIAGNOSIS — E11.9 TYPE 2 DIABETES MELLITUS WITHOUT COMPLICATION, WITHOUT LONG-TERM CURRENT USE OF INSULIN (HCC): Primary | ICD-10-CM

## 2020-03-25 DIAGNOSIS — E78.00 HYPERCHOLESTEREMIA: ICD-10-CM

## 2020-03-25 DIAGNOSIS — Z09 FOLLOW UP: ICD-10-CM

## 2020-03-25 DIAGNOSIS — Z76.89 ESTABLISHING CARE WITH NEW DOCTOR, ENCOUNTER FOR: ICD-10-CM

## 2020-03-25 DIAGNOSIS — K85.00 IDIOPATHIC ACUTE PANCREATITIS WITHOUT INFECTION OR NECROSIS: ICD-10-CM

## 2020-03-25 LAB
AMYLASE SERPL-CCNC: 40 U/L (ref 28–100)
BUN SERPL-MCNC: 19 MG/DL (ref 6–20)
BUN/CREAT SERPL: 13.7 (ref 7–25)
CALCIUM SERPL-MCNC: 9.5 MG/DL (ref 8.6–10.5)
CHLORIDE SERPL-SCNC: 96 MMOL/L (ref 98–107)
CHOLEST SERPL-MCNC: 147 MG/DL (ref 0–200)
CO2 SERPL-SCNC: 27.4 MMOL/L (ref 22–29)
CREAT SERPL-MCNC: 1.39 MG/DL (ref 0.76–1.27)
GLUCOSE SERPL-MCNC: 188 MG/DL (ref 65–99)
HBA1C MFR BLD: 7.8 % (ref 4.8–5.6)
HDLC SERPL-MCNC: 26 MG/DL (ref 40–60)
LDLC SERPL CALC-MCNC: 62 MG/DL (ref 0–100)
LIPASE SERPL-CCNC: 30 U/L (ref 13–60)
POTASSIUM SERPL-SCNC: 5 MMOL/L (ref 3.5–5.2)
SODIUM SERPL-SCNC: 135 MMOL/L (ref 136–145)
T4 FREE SERPL-MCNC: 1.96 NG/DL (ref 0.93–1.7)
TRIGL SERPL-MCNC: 293 MG/DL (ref 0–150)
TSH SERPL DL<=0.005 MIU/L-ACNC: 6.85 UIU/ML (ref 0.27–4.2)
VLDLC SERPL CALC-MCNC: 58.6 MG/DL

## 2020-03-25 PROCEDURE — 99204 OFFICE O/P NEW MOD 45 MIN: CPT | Performed by: INTERNAL MEDICINE

## 2020-03-25 RX ORDER — FAMOTIDINE 20 MG/1
20 TABLET, FILM COATED ORAL DAILY PRN
COMMUNITY

## 2020-03-25 NOTE — PROGRESS NOTES
"  Cesar Vann is a 54 y.o. male who presents with   Chief Complaint   Patient presents with   • Diabetes/pancreatitis     Follow-up; idiopathic pancreatitis-hospitalization at [facility] March 7, 2022 March 19, 2020.  Was told during this hospitalization that he was diabetic.  A1c 8.8.  Blood sugar is also elevated.  Patient placed on Metformin 850 mg as directed   • Hypertension     Metoprolol succinate  mg   • Hyperlipidemia     No prescription medication   • Establishing care with new physician     Former patient of \"[facility]\".  Patient not happy with his care there and is seeking private medical care through [facility] and our office   .    54-year-old obese male.  Current weight 260 pounds.  Current height approximately 5 feet 11.  Estimated goal weight 185 pounds.  New patient to me.  Initial visit.  Former patient of [facility].  Currently working as a nurse at the [employer].  He presents with a history that he was in the hospital March 7 to March 19 at [facility] for acute pancreatitis.  He says he does not drink and does not use drugs and had no particular reason why he should have had such a problem.  CT scan in the hospital showed evidence of pancreatitis and also evidence of a prior cholecystectomy.  His lipase on March 7 was greater than 3000, on March 8 it had dropped to 561, on March 9 it had dropped to 170, and on March 10 it was normal at 39.  He was seen by gastroenterology while in the hospital and was seen by Dr. Still, Dr. Salinas and ..  He says the plan going forward is for 1 of them to see him in the near future after he recovers completely from the pancreatitis and get him set up for a colonoscopy.  He also has a history of chronic atrial fibrillation which he has had for 10 years.  He has had an ablation in the past for atrial flutter he says.  He currently sees Dr. Velasquez for cardiology care.  He also said he had rheumatic fever " "as a young child and although he does not have a heart murmur he said \"it left me with chronic lifetime arrhythmias\".  He says in the past he has a history of hyperlipidemia and was on statin therapy but whenever his lipids dropped to normal he said his doctor then \"took me off of statin therapy and the lipid levels went back up\".  He has no idea why they kept treating him and then stopping the medication when his lipid levels were normal.    Hypertension   This is a chronic problem. The current episode started more than 1 year ago. The problem is unchanged. The problem is controlled. Current antihypertension treatment includes beta blockers. The current treatment provides moderate improvement. Compliance problems include diet and exercise.    Hyperlipidemia   This is a chronic problem. The current episode started more than 1 year ago. Condition status: To be determined by today's testing. Lipid results: To be determined by today's testing. He is currently on no antihyperlipidemic treatment. Compliance problems include adherence to diet and adherence to exercise.         /68   Pulse 70   Temp 96.4 °F (35.8 °C) (Temporal)   Resp 13   Ht 180.3 cm (70.98\")   Wt 118 kg (260 lb 9.6 oz)   BMI 36.36 kg/m²     The following portions of the patient's history were reviewed and updated as appropriate: allergies, current medications, past medical history, past surgical history and problem list.    Review of Systems   Constitutional: Negative.    HENT: Negative.    Eyes: Negative.    Respiratory: Negative.    Cardiovascular: Negative.    Genitourinary: Negative.    Musculoskeletal: Negative.    Skin: Negative.    Neurological: Negative.    Psychiatric/Behavioral: Negative.        Objective   Physical Exam   Constitutional: He is oriented to person, place, and time. He appears well-developed and well-nourished. No distress.   Obese 54-year-old male as noted above   HENT:   Head: Normocephalic and atraumatic.   Eyes: " Pupils are equal, round, and reactive to light. Conjunctivae and EOM are normal.   Neck: Normal range of motion. Neck supple. No thyromegaly present.   Neck exam negative.  Carotid auscultation normal-no bruits heard.   Cardiovascular: Normal rate, regular rhythm, normal heart sounds and intact distal pulses. Exam reveals no gallop and no friction rub.   No murmur heard.  Pulmonary/Chest: Effort normal and breath sounds normal. No respiratory distress. He has no wheezes. He has no rales. He exhibits no tenderness.   Abdominal:   Subjectively he has no abdominal pain on today's visit.  He says that he gets filled up quickly when he eats but otherwise he feels he is generally getting better.   Neurological: He is alert and oriented to person, place, and time.   Psychiatric: He has a normal mood and affect. His behavior is normal. Judgment and thought content normal.   Nursing note and vitals reviewed.      Assessment/Plan   Cesar was seen today for diabetes/pancreatitis, hypertension, hyperlipidemia and establishing care with new physician.    Diagnoses and all orders for this visit:    Type 2 diabetes mellitus without complication, without long-term current use of insulin (CMS/[facility])  -     Basic Metabolic Panel  -     Hemoglobin A1c  -     Lipid Panel  -     TSH+Free T4    Follow up    Essential hypertension  -     Basic Metabolic Panel  -     TSH+Free T4    Hypercholesteremia  -     Lipid Panel  -     TSH+Free T4    Idiopathic acute pancreatitis without infection or necrosis  -     Lipid Panel  -     Amylase  -     Lipase    Establishing care with new doctor, encounter for      Plan: Will do updated labs as above.  Follow-up will be dictated by today's findings.  For now he will continue all treatment as prescribed.    Going forward I have suggested an annual physical sometime next March or April.    Continue cardiology care with [facility].    Patient to get colonoscopy in the near future as noted  from the history above.    Total amount of time spent with this new patient going over his medical history, reviewing labs, reviewing medicines and discussing plans for the future regarding his general medical care amounted to 45 minutes.  Better than 50% of the time was spent in direct face to face consultation with the patient.    Current outpatient and discharge medications have been reconciled for the patient.  Reviewed by: Roger Man MD

## 2020-03-25 NOTE — OUTREACH NOTE
Medical Week 1 Survey      Responses   [facility] [place] patient discharged from?  [place]   Does the patient have one of the following disease processes/diagnoses(primary or secondary)?  Other   Is there a successful TCM telephone encounter documented?  No   Week 1 attempt successful?  Yes   Call start time  1749   Call end time  1754   Discharge diagnosis  Acute pancreatitis   Person spoke with today (if not patient) and relationship  April-sister   Meds reviewed with patient/caregiver?  Yes   Is the patient having any side effects they believe may be caused by any medication additions or changes?  No   Does the patient have all medications ordered at discharge?  Yes   Is the patient taking all medications as directed (includes completed medication regime)?  Yes   Comments regarding appointments  Cardiology appt 4/15/20   Does the patient have a primary care provider?   Yes   Does the patient have an appointment with their PCP within 7 days of discharge?  Yes   Comments regarding PCP  Appt with Dr. Man 3/25/20   Has the patient kept scheduled appointments due by today?  Yes   Psychosocial issues?  No   Did the patient receive a copy of their discharge instructions?  Yes   Nursing interventions  Reviewed instructions with patient   What is the patient's perception of their health status since discharge?  Improving   Is the patient/caregiver able to teach back signs and symptoms related to disease process for when to call PCP?  Yes   Is the patient/caregiver able to teach back signs and symptoms related to disease process for when to call 911?  Yes   Is the patient/caregiver able to teach back the hierarchy of who to call/visit for symptoms/problems? PCP, Specialist, Home health nurse, Urgent Care, ED, 911  Yes   Week 1 call completed?  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           Manjula Kruse RN

## 2020-04-01 ENCOUNTER — READMISSION MANAGEMENT (OUTPATIENT)
Dept: CALL CENTER | Facility: HOSPITAL | Age: 55
End: 2020-04-01

## 2020-04-01 NOTE — OUTREACH NOTE
Medical Week 2 Survey      Responses   [facility] patient discharged from?  [place]   Does the patient have one of the following disease processes/diagnoses(primary or secondary)?  Other   Week 2 attempt successful?  No   Unsuccessful attempts  Attempt 1          Antonette Arrington RN

## 2020-04-03 ENCOUNTER — READMISSION MANAGEMENT (OUTPATIENT)
Dept: CALL CENTER | Facility: HOSPITAL | Age: 55
End: 2020-04-03

## 2020-04-03 NOTE — OUTREACH NOTE
Medical Week 2 Survey      Responses   [facility] patient discharged from?  [place]   Does the patient have one of the following disease processes/diagnoses(primary or secondary)?  Other   Week 2 attempt successful?  No   Unsuccessful attempts  Attempt 2          Nadia Adhikari RN

## 2020-04-07 ENCOUNTER — TELEPHONE (OUTPATIENT)
Dept: CARDIOLOGY | Facility: CLINIC | Age: 55
End: 2020-04-07

## 2020-04-07 NOTE — TELEPHONE ENCOUNTER
S/w patient offered telehealth visit. Prefer video over telephone. He is to check his work schedule and see if 4/15 at 1 pm is feasible for him. He is to call us back

## 2020-04-09 ENCOUNTER — READMISSION MANAGEMENT (OUTPATIENT)
Dept: CALL CENTER | Facility: HOSPITAL | Age: 55
End: 2020-04-09

## 2020-04-09 NOTE — OUTREACH NOTE
Medical Week 3 Survey      Responses   [facility] patient discharged from?  [place]   COVID-19 Test Status  Not tested   Does the patient have one of the following disease processes/diagnoses(primary or secondary)?  Other   Week 3 attempt successful?  No   Unsuccessful attempts  Attempt 1          Jayesh Kimball RN

## 2020-04-10 ENCOUNTER — READMISSION MANAGEMENT (OUTPATIENT)
Dept: CALL CENTER | Facility: HOSPITAL | Age: 55
End: 2020-04-10

## 2020-04-10 NOTE — OUTREACH NOTE
Medical Week 3 Survey      Responses   [facility] patient discharged from?  [place]   COVID-19 Test Status  Not tested   Does the patient have one of the following disease processes/diagnoses(primary or secondary)?  Other   Week 3 attempt successful?  No   Unsuccessful attempts  Attempt 2          Nadia Adhikari RN

## 2020-04-15 ENCOUNTER — TELEMEDICINE (OUTPATIENT)
Dept: CARDIOLOGY | Facility: CLINIC | Age: 55
End: 2020-04-15

## 2020-04-15 VITALS
BODY MASS INDEX: 36.4 KG/M2 | HEIGHT: 71 IN | DIASTOLIC BLOOD PRESSURE: 70 MMHG | HEART RATE: 90 BPM | WEIGHT: 260 LBS | SYSTOLIC BLOOD PRESSURE: 136 MMHG

## 2020-04-15 DIAGNOSIS — I48.0 PAF (PAROXYSMAL ATRIAL FIBRILLATION) (HCC): Primary | ICD-10-CM

## 2020-04-15 DIAGNOSIS — Z99.89 OSA ON CPAP: ICD-10-CM

## 2020-04-15 DIAGNOSIS — E78.5 HYPERLIPIDEMIA LDL GOAL <100: ICD-10-CM

## 2020-04-15 DIAGNOSIS — I10 ESSENTIAL HYPERTENSION: ICD-10-CM

## 2020-04-15 DIAGNOSIS — G47.33 OSA ON CPAP: ICD-10-CM

## 2020-04-15 PROCEDURE — 99214 OFFICE O/P EST MOD 30 MIN: CPT | Performed by: NURSE PRACTITIONER

## 2020-04-15 RX ORDER — AMIODARONE HYDROCHLORIDE 200 MG/1
100 TABLET ORAL DAILY
Qty: 30 TABLET | Refills: 0
Start: 2020-04-15

## 2020-04-15 RX ORDER — METOPROLOL SUCCINATE 100 MG/1
100 TABLET, EXTENDED RELEASE ORAL 2 TIMES DAILY
Qty: 60 TABLET | Refills: 11 | Status: SHIPPED | OUTPATIENT
Start: 2020-04-15

## 2020-04-15 NOTE — PROGRESS NOTES
Date of Office Visit: 04/15/20  Encounter Provider: JUICE Case  Place of Service: [facility] CARDIOLOGY  Patient Name: Cesar Vann  :1965    Chief Complaint   Patient presents with   • Atrial Fibrillation   • Hypertension   :     HPI: Cesar Vann is a 54 y.o. male  with history of Rheumatic fever, paroxysmal atrial fibrillation, hypertension,diabetes mellitus, RYNE, hyperlipidemia, pancreatitis, and obesity.     He is followed by Dr. Velasquez and I will visit with him in follow up today.    He reported being diagnosed with rheumatic fever as a child and had atrial fibrillation and was maintained on beta blocker. In  or  he had an atrial flutter ablation at [facility].In 2017 he reported having his INR checked routinely and only having palpitations lasting just a few seconds almost daily without any associated symptoms. He had no murmur or click auscultated at that visit. He had an abnormal ECG with evidence of possible myocardial infarction. Perfusion stress test was negative for ischemia. He had echocardiogram which showed normal left ventricular systolic function, moderate concentric hypertrophy, mild aortic valve thickening and trace MR.    He then was admitted in 2020 with acute pancreatitis. He presented with abdominal pain, nausea and vomitting. He also had a fib with RVR. HE reported he had stopped his coumadin prior to that admit. GI followed and he was treated with pain medication, gut rest, and IV fluids. HE was placed on amiodarone drip and converted so he transition to PO amiodarone maintained on 200 mg daily.He continued to have abdominal pain and repeat CT on 3/11/20 showed worsening inflammation and mild mesenteric lymph node. IgG was ok, triglycerides normal and no ductal abnormalities noted on MRCP. CA 19-9 was mildly elevated and would recommend repeat as outpatient. Lipase returned to normal and pain resolved. He  "developed some volume overload and elevated blood pressure. HIs metoprolol was increased and he responded well to IV lasix. He had an unremarkable echocardiogram. He was started on Eliquis 5 mg twice daily.    He now presents for reassessment and we visit via video. He has no chest pain, no shortness of breath or swelling. He was able to mow the grass. He has a cough which he relates to allergies. NO near syncope or syncope. HE is wearing CPAP nightly. At night on occasion he has some palpitations that range just a few moments or sometimes he falls asleep then it is gone be the morning.        Allergies   Allergen Reactions   • Latex Itching       Past Medical History:   Diagnosis Date   • Acute stress disorder    • Anxiety    • Asthma    • Atrial fibrillation (CMS/HCC)    • Chronic back pain    • Chronic knee pain    • Depression    • Dyslipidemia    • Hypercholesteremia    • Hypertension    • Noncompliance with treatment    • Obesity    • Prediabetes    • Sleep apnea    • Type 2 diabetes mellitus without complication, without long-term current use of insulin (CMS/HCC)        Past Surgical History:   Procedure Laterality Date   • ABLATION OF DYSRHYTHMIC FOCUS     • CHOLECYSTECTOMY           Family and social history reviewed.     Review of Systems   Cardiovascular: Positive for palpitations.   Respiratory: Positive for cough.    Hematologic/Lymphatic: Negative for bleeding problem.   Gastrointestinal: Negative for abdominal pain.     All other systems were reviewed and are negative          Objective:     Vitals:    04/15/20 1251   BP: 136/70   Pulse: 90   Weight: 118 kg (260 lb)   Height: 180.3 cm (71\")     Body mass index is 36.26 kg/m².    PHYSICAL EXAM:  Physical Exam  Patient is well developed, glasses, A & O x4, memory intact respirations normal rate, non labored, PERRLA, skin color pink    Procedures  Unable to assess  Current Outpatient Medications   Medication Sig Dispense Refill   • acetaminophen " (TYLENOL) 325 MG tablet Take 2 tablets by mouth Every 4 (Four) Hours As Needed for Mild Pain .     • amiodarone (PACERONE) 200 MG tablet Take 0.5 tablets by mouth Daily. 30 tablet 0   • apixaban (ELIQUIS) 5 MG tablet tablet Take 1 tablet by mouth Every 12 (Twelve) Hours. Indications: Atrial Fibrillation 60 tablet 11   • famotidine (PEPCID) 20 MG tablet Take 20 mg by mouth Daily As Needed.     • glucose blood test strip Use as instructed. Check at least once daily 30 each 1   • glucose monitoring kit (FREESTYLE) monitoring kit 1 each As Needed (check glucose). 1 each 0   • Lancets (FREESTYLE) lancets Check glucose once daily 100 each 1   • MELATONIN PO Take  by mouth At Night As Needed.     • metFORMIN (GLUCOPHAGE) 850 MG tablet Take 1 tablet by mouth Daily With Breakfast. 30 tablet 0   • metoprolol succinate XL (TOPROL-XL) 100 MG 24 hr tablet Take 1 tablet by mouth 2 (Two) Times a Day. 60 tablet 11     No current facility-administered medications for this visit.      Assessment:       Diagnosis Plan   1. PAF (paroxysmal atrial fibrillation) (CMS/HCC)     2. Essential hypertension     3. RYNE on CPAP     4. Hyperlipidemia LDL goal <100  Lipid Panel    Comprehensive Metabolic Panel        Orders Placed This Encounter   Procedures   • Lipid Panel     Standing Status:   Future     Standing Expiration Date:   4/15/2021   • Comprehensive Metabolic Panel     Standing Status:   Future     Standing Expiration Date:   4/16/2021         Plan:    1. 54 year old gentleman with reported rheumatic fever not sure that he has rheumatic heart disease. Echo 03/2020 normal ef no significant valvular disease  2. Paroxysmal atrial fibrillation and prior flutter ablation in 2005 or 2006 at [facility] . Then recurrence in the setting of acute pancreatitis 03/2020 now on Eliquis. CHADS2-VASc score is 2.  -I will cut his amiodarone down from 200 mg daily to 100 mg daily x 2 weeks then he will stop it.   - he is to call with any increased  palpitations. He understands to continue on Eliquis  3.  History of abnormal ECG negative stress in 07/2017 no angina  4.  Obstructive sleep apnea on CPAP. Reports compliance  5.  Hypertension blood pressure adequately controlled continue the same  6.  Hyperlipidemia. LDL 03/2020 was 62.  He is off atorvastatin per PCP.  ASCVD Risk high 14.3 % compared to age match control of 3.2%.  Discussed with patient in depth that he is high risk and would benefit to be on high intensity statin. He previously tolerated atorvastatin 40 mg but is not agreeable to restarting that now. He has requested a repeat fasting lipid panel. Will add CMP and he is to have that completed in 3 months when he returns  7. Diabetes mellitus Hgb A1c  8.8 and 7.8 in 03/2020 now on metformin If needed would consider starting medication to reduce Cardiovascular events such as SGLT2 inhibitors (Jardiance, Farxiga or Invokana ) or GLP-1 receptor agonists such as Victoza. Followed by PCP  8. History of acute pancreatitis 03/2020    This patient has consented to a telehealth visit via video. The visit was scheduled as a video visit to comply with patient safety concerns in accordance with CDC recommendations.  All vitals recorded within this visit are reported by the patient.  I spent  35 minutes in total including but not limited to the 20 minutes spent in direct conversation with this patient.     Follow up in 3 months weeks with Dr. Velasquez call with questions or concerns.      It has been a pleasure to participate in this patient's care.      Thank you,  JUICE Case      **I used Dragon to dictate this note:**

## 2020-04-29 DIAGNOSIS — E11.9 TYPE 2 DIABETES MELLITUS WITHOUT COMPLICATION, WITHOUT LONG-TERM CURRENT USE OF INSULIN (HCC): Primary | ICD-10-CM

## 2020-05-27 DIAGNOSIS — E11.9 TYPE 2 DIABETES MELLITUS WITHOUT COMPLICATION, WITHOUT LONG-TERM CURRENT USE OF INSULIN (HCC): ICD-10-CM

## 2020-07-21 DIAGNOSIS — E11.9 TYPE 2 DIABETES MELLITUS WITHOUT COMPLICATION, WITHOUT LONG-TERM CURRENT USE OF INSULIN (HCC): ICD-10-CM

## 2020-07-30 ENCOUNTER — OFFICE VISIT (OUTPATIENT)
Dept: CARDIOLOGY | Facility: CLINIC | Age: 55
End: 2020-07-30

## 2020-07-30 ENCOUNTER — LAB (OUTPATIENT)
Dept: LAB | Facility: HOSPITAL | Age: 55
End: 2020-07-30

## 2020-07-30 VITALS
BODY MASS INDEX: 36.82 KG/M2 | SYSTOLIC BLOOD PRESSURE: 162 MMHG | HEART RATE: 54 BPM | DIASTOLIC BLOOD PRESSURE: 80 MMHG | WEIGHT: 263 LBS | HEIGHT: 71 IN

## 2020-07-30 DIAGNOSIS — I48.0 PAF (PAROXYSMAL ATRIAL FIBRILLATION) (HCC): Primary | ICD-10-CM

## 2020-07-30 DIAGNOSIS — I10 ESSENTIAL HYPERTENSION: ICD-10-CM

## 2020-07-30 DIAGNOSIS — Z99.89 OSA ON CPAP: ICD-10-CM

## 2020-07-30 DIAGNOSIS — G47.33 OSA ON CPAP: ICD-10-CM

## 2020-07-30 DIAGNOSIS — E78.5 HYPERLIPIDEMIA LDL GOAL <100: ICD-10-CM

## 2020-07-30 LAB
CHOLEST SERPL-MCNC: 145 MG/DL (ref 0–200)
HDLC SERPL-MCNC: 28 MG/DL (ref 40–60)
LDLC SERPL CALC-MCNC: 89 MG/DL (ref 0–100)
LDLC/HDLC SERPL: 3.19 {RATIO}
TRIGL SERPL-MCNC: 139 MG/DL (ref 0–150)
VLDLC SERPL-MCNC: 27.8 MG/DL (ref 5–40)

## 2020-07-30 PROCEDURE — 99214 OFFICE O/P EST MOD 30 MIN: CPT | Performed by: INTERNAL MEDICINE

## 2020-07-30 PROCEDURE — 36415 COLL VENOUS BLD VENIPUNCTURE: CPT | Performed by: INTERNAL MEDICINE

## 2020-07-30 PROCEDURE — 93000 ELECTROCARDIOGRAM COMPLETE: CPT | Performed by: INTERNAL MEDICINE

## 2020-07-30 PROCEDURE — 80061 LIPID PANEL: CPT | Performed by: INTERNAL MEDICINE

## 2020-07-30 RX ORDER — ATORVASTATIN CALCIUM 40 MG/1
40 TABLET, FILM COATED ORAL DAILY
Qty: 90 TABLET | Refills: 3 | Status: SHIPPED | OUTPATIENT
Start: 2020-07-30

## 2020-07-30 NOTE — PROGRESS NOTES
Date of Office Visit: 20  Encounter Provider: Baldomero Velasquez MD  Place of Service: [facility]  Patient Name: Cesar Vann  :1965  Referral Provider:No ref. provider found      No chief complaint on file.    History of Present Illness  Mr Vann is a pleasant 55 yo gentleman with a history of apparently rheumatic fever, and atrial fibrillation, hypertension, hyperlipidemia.  He reported being diagnosed with rheumatic fever as a child and had atrial fibrillation and was maintained on beta blocker. In  or  he had an atrial flutter ablation at [facility].In 2017 he reported having his INR checked routinely and only having palpitations lasting just a few seconds almost daily without any associated symptoms. He had no murmur or click auscultated at that visit. He had an abnormal ECG with evidence of possible myocardial infarction. Perfusion stress test was negative for ischemia. He had echocardiogram which showed normal left ventricular systolic function, moderate concentric hypertrophy, mild aortic valve thickening and trace MR.     He then was admitted in 2020 with acute pancreatitis. He presented with abdominal pain, nausea and vomitting. He also had a fib with RVR. HE reported he had stopped his coumadin prior to that admit. GI followed and he was treated with pain medication, gut rest, and IV fluids. HE was placed on amiodarone drip and converted so he transition to PO amiodarone maintained on 200 mg daily.He continued to have abdominal pain and repeat CT on 3/11/20 showed worsening inflammation and mild mesenteric lymph node. IgG was ok, triglycerides normal and no ductal abnormalities noted on MRCP. CA 19-9 was mildly elevated and would recommend repeat as outpatient. Lipase returned to normal and pain resolved. He developed some volume overload and elevated blood pressure. HIs metoprolol was increased and he responded well to  IV lasix. He had an unremarkable echocardiogram. He was started on Eliquis 5 mg twice daily.  Doing well.  He does have edema when he is on his feet for prolonged period of time but has had no chest pain or pressure no shortness of breath orthopnea or PND.  He does get irregular rhythm at times at night may be a couple times a month he says it can last 24 hours.  No dizziness, near-syncope or syncope.  Overall he feels like he is doing well.        Past Medical History:   Diagnosis Date   • Acute pancreatitis    • Acute stress disorder    • Anxiety    • Asthma    • Atrial fibrillation (CMS/HCC)    • Atrial flutter (CMS/HCC)    • Chronic back pain    • Chronic knee pain    • Depression    • Dyslipidemia    • Hypercholesteremia    • Hypertension    • Noncompliance with treatment    • Obesity    • Prediabetes    • Sleep apnea    • Thrombophlebitis    • Type 2 diabetes mellitus without complication, without long-term current use of insulin (CMS/HCC)          Past Surgical History:   Procedure Laterality Date   • ABLATION OF DYSRHYTHMIC FOCUS     • CHOLECYSTECTOMY           Current Outpatient Medications on File Prior to Visit   Medication Sig Dispense Refill   • acetaminophen (TYLENOL) 325 MG tablet Take 2 tablets by mouth Every 4 (Four) Hours As Needed for Mild Pain .     • amiodarone (PACERONE) 200 MG tablet Take 0.5 tablets by mouth Daily. 30 tablet 0   • apixaban (ELIQUIS) 5 MG tablet tablet Take 1 tablet by mouth Every 12 (Twelve) Hours. Indications: Atrial Fibrillation 60 tablet 11   • famotidine (PEPCID) 20 MG tablet Take 20 mg by mouth Daily As Needed.     • glucose blood test strip Use as instructed. Check at least once daily 30 each 1   • glucose monitoring kit (FREESTYLE) monitoring kit 1 each As Needed (check glucose). 1 each 0   • Lancets (FREESTYLE) lancets Check glucose once daily 100 each 1   • MELATONIN PO Take  by mouth At Night As Needed.     • metFORMIN (GLUCOPHAGE) 850 MG tablet TAKE 1 TABLET BY  MOUTH EVERY DAY WITH BREAKFAST 30 tablet 5   • metoprolol succinate XL (TOPROL-XL) 100 MG 24 hr tablet Take 1 tablet by mouth 2 (Two) Times a Day. 60 tablet 11     No current facility-administered medications on file prior to visit.          Social History     Socioeconomic History   • Marital status: Single     Spouse name: Not on file   • Number of children: Not on file   • Years of education: Not on file   • Highest education level: Not on file   Tobacco Use   • Smoking status: Never Smoker   Substance and Sexual Activity   • Alcohol use: No       Family History   Problem Relation Age of Onset   • Heart disease Father    • Heart disease Paternal Grandfather    • Stroke Other    • Cancer Other    • Stroke Mother          Review of Systems   Constitution: Negative for decreased appetite, diaphoresis, fever, malaise/fatigue, weight gain and weight loss.   HENT: Negative for congestion, hearing loss, nosebleeds and tinnitus.    Eyes: Negative for blurred vision, double vision, vision loss in left eye, vision loss in right eye and visual disturbance.   Cardiovascular:        As noted in HPI   Respiratory:        As noted HPI   Endocrine: Negative for cold intolerance and heat intolerance.   Hematologic/Lymphatic: Negative for bleeding problem. Does not bruise/bleed easily.   Skin: Negative for color change, flushing, itching and rash.   Musculoskeletal: Positive for joint pain. Negative for arthritis, back pain, joint swelling, muscle weakness and myalgias.   Gastrointestinal: Negative for bloating, abdominal pain, constipation, diarrhea, dysphagia, heartburn, hematemesis, hematochezia, melena, nausea and vomiting.   Genitourinary: Negative for bladder incontinence, dysuria, frequency, nocturia and urgency.   Neurological: Negative for dizziness, focal weakness, headaches, light-headedness, loss of balance, numbness, paresthesias, vertigo and weakness.   Psychiatric/Behavioral: Positive for depression. Negative for  memory loss and substance abuse.       Procedures      ECG 12 Lead  Date/Time: 7/30/2020 10:42 AM  Performed by: Baldomero Velasquez MD  Authorized by: Baldomero Velasquez MD   Comparison: compared with previous ECG   Similar to previous ECG  Rhythm: sinus rhythm  Rate: normal  QRS axis: normal                  Objective:    There were no vitals taken for this visit.       Physical Exam  Physical Exam   Constitutional: He is oriented to person, place, and time. He appears well-developed and well-nourished. No distress.   HENT:   Head: Normocephalic.   Eyes: Pupils are equal, round, and reactive to light. Conjunctivae are normal. No scleral icterus.   Neck: Normal carotid pulses, no hepatojugular reflux and no JVD present. Carotid bruit is not present. No tracheal deviation, no edema and no erythema present. No thyromegaly present.   Cardiovascular: Normal rate, regular rhythm, S1 normal, S2 normal, normal heart sounds and intact distal pulses.  No extrasystoles are present. PMI is not displaced. Exam reveals no gallop, no distant heart sounds and no friction rub.   No murmur heard.  Pulses:       Carotid pulses are 2+ on the right side, and 2+ on the left side.       Radial pulses are 2+ on the right side, and 2+ on the left side.        Femoral pulses are 2+ on the right side, and 2+ on the left side.       Dorsalis pedis pulses are 2+ on the right side, and 2+ on the left side.        Posterior tibial pulses are 2+ on the right side, and 2+ on the left side.   Pulmonary/Chest: Effort normal and breath sounds normal. No respiratory distress. He has no decreased breath sounds. He has no wheezes. He has no rhonchi. He has no rales. He exhibits no tenderness.   Abdominal: Soft. Bowel sounds are normal. He exhibits no distension and no mass. There is no hepatosplenomegaly. There is no tenderness. There is no rebound and no guarding.   Musculoskeletal: He exhibits edema (1+ bilateral tibial). He exhibits no tenderness or  deformity.   Neurological: He is alert and oriented to person, place, and time.   Skin: Skin is warm and dry. No rash noted. He is not diaphoretic. No cyanosis or erythema. No pallor. Nails show no clubbing.   Psychiatric: He has a normal mood and affect. His speech is normal and behavior is normal. Judgment and thought content normal.           Assessment:    1. 54 year old gentleman with reported rheumatic fever not sure that he has rheumatic heart disease. Echo 03/2020 normal ef no significant valvular disease  2. Paroxysmal atrial fibrillation and prior flutter ablation in 2005 or 2006 at [facility] . Then recurrence in the setting of acute pancreatitis 03/2020 now on Eliquis. CHADS2-VASc score is 2.  Still probably having some recurrences but relatively asymptomatic we will just continue anticoagulation.  He will return for follow-up in 6 months.  3.  History of abnormal ECG negative stress in 07/2017 no angina  4.  Obstructive sleep apnea on CPAP. Reports compliance  5.  Hypertension blood pressure adequately controlled continue the same  6.  Hyperlipidemia. LDL 03/2020 was 62.    He is to have a follow-up lipid profile today.  We will then recalculate his [employer] risk of stroke or heart attack make recommendations pending that.  7. Diabetes mellitus. If needed would consider starting medication to reduce Cardiovascular events such as SGLT2 inhibitors (Jardiance, Farxiga or Invokana ) or GLP-1 receptor agonists such as Victoza. Followed by PCP  8. History of acute pancreatitis 03/2020     Lipid profile today his [employer] risk of stroke or heart attack in 10 years is intermediate at 14.9% that however that is compared to age-matched control of 3.2% he would benefit from intermediate dose statin therapy.  Will start 40 mg atorvastatin a day.     Plan: